# Patient Record
Sex: FEMALE | Race: WHITE | NOT HISPANIC OR LATINO | Employment: FULL TIME | URBAN - METROPOLITAN AREA
[De-identification: names, ages, dates, MRNs, and addresses within clinical notes are randomized per-mention and may not be internally consistent; named-entity substitution may affect disease eponyms.]

---

## 2017-06-29 ENCOUNTER — ALLSCRIPTS OFFICE VISIT (OUTPATIENT)
Dept: OTHER | Facility: OTHER | Age: 37
End: 2017-06-29

## 2018-01-10 NOTE — MISCELLANEOUS
Message   Recorded as Task   Date: 05/11/2016 09:01 AM, Created By: Catherine Villanueva   Task Name: Med Renewal Request   Assigned To: Marielle Doe   Regarding Patient: Ruel Mcghee, Status: Active   Comment:    Catherine Villanueva - 11 May 2016 9:01 AM     TASK CREATED  Caller: Self; Renew Medication; [phone] (Mobile Phone)  pt called - needs her nuvaring refilled till her 8/30/16 appt with D87  Sent to the [facility] in [address] 11 May 2016 9:10 AM     TASK EDITED  sent to pharm        Active Problems    1  Cervicitis (616 0) (N72)   2  Encounter for routine gynecological examination (V72 31) (Z01 419)   3  Menorrhagia (626 2) (N92 0)   4  Routine Gynecological Exam With Cervical Pap Smear (V72 31)   5  Screening for human papillomavirus (HPV) (V73 81) (Z11 51)    Current Meds   1  NuvaRing 0 12-0 015 MG/24HR Vaginal Ring; USE AS DIRECTED; Therapy: [id number] to (Evaluate:26Jan2016)  Requested for: [id number]; Last   Rx:[id number] Ordered   2  ZyrTEC Allergy CAPS; Therapy: (Recorded:28Oct2013) to Recorded    Allergies    1   No Known Drug Allergies    Plan  Menorrhagia    · NuvaRing 0 12-0 015 MG/24HR Vaginal Ring; USE AS DIRECTED    Signatures   Electronically signed by : Margie Lloyd, ; May 11 2016  9:10AM EST                       (Author)

## 2018-01-14 VITALS
BODY MASS INDEX: 32.02 KG/M2 | SYSTOLIC BLOOD PRESSURE: 120 MMHG | HEIGHT: 67 IN | WEIGHT: 204 LBS | DIASTOLIC BLOOD PRESSURE: 70 MMHG

## 2018-07-25 ENCOUNTER — ANNUAL EXAM (OUTPATIENT)
Dept: OBGYN CLINIC | Facility: CLINIC | Age: 38
End: 2018-07-25
Payer: COMMERCIAL

## 2018-07-25 VITALS
WEIGHT: 210 LBS | BODY MASS INDEX: 33.75 KG/M2 | DIASTOLIC BLOOD PRESSURE: 70 MMHG | HEIGHT: 66 IN | SYSTOLIC BLOOD PRESSURE: 124 MMHG

## 2018-07-25 DIAGNOSIS — Z01.419 ENCOUNTER FOR GYNECOLOGICAL EXAMINATION (GENERAL) (ROUTINE) WITHOUT ABNORMAL FINDINGS: Primary | ICD-10-CM

## 2018-07-25 DIAGNOSIS — Z30.44 ENCOUNTER FOR SURVEILLANCE OF VAGINAL RING HORMONAL CONTRACEPTIVE DEVICE: ICD-10-CM

## 2018-07-25 PROCEDURE — S0612 ANNUAL GYNECOLOGICAL EXAMINA: HCPCS | Performed by: PHYSICIAN ASSISTANT

## 2018-07-25 RX ORDER — ETONOGESTREL AND ETHINYL ESTRADIOL 11.7; 2.7 MG/1; MG/1
INSERT, EXTENDED RELEASE VAGINAL
COMMUNITY
Start: 2013-10-28 | End: 2018-07-25 | Stop reason: SDUPTHER

## 2018-07-25 RX ORDER — ETONOGESTREL AND ETHINYL ESTRADIOL 11.7; 2.7 MG/1; MG/1
1 INSERT, EXTENDED RELEASE VAGINAL
Qty: 3 EACH | Refills: 3 | Status: SHIPPED | OUTPATIENT
Start: 2018-07-25 | End: 2019-07-31 | Stop reason: SDUPTHER

## 2018-07-25 NOTE — PROGRESS NOTES
Assessment/Plan  Problem List Items Addressed This Visit     Encounter for gynecological examination (general) (routine) without abnormal findings - Primary     Annual exam performed  Contraception rx sent via EMR  RTO 1 year           Other Visit Diagnoses     Encounter for surveillance of vaginal ring hormonal contraceptive device        Relevant Medications    etonogestrel-ethinyl estradiol (NUVARING) 0 12-0 015 MG/24HR vaginal ring        Lela Dodd is a 43 y o  female who presents for annual GYN exam  She denies any changes in Medical, Surgical or Family  Periods are regular every 28-30 days  Dysmenorrhea:none  She denies intermenstrual bleeding, spotting, or discharge  She denies that she is sexually active at this time  Last Pap smear: 2016  Regular self breast exam: no    Family history of uterine or ovarian cancer: no  Family history of breast cancer: no  Family history of colon cancer: no    Menstrual History:  OB History      Para Term  AB Living    0 0 0 0 0 0    SAB TAB Ectopic Multiple Live Births    0 0 0 0 0        Obstetric Comments    FIRST PERIOD 14YEARS OLD           Patient's last menstrual period was 2018 (exact date)  Period Pattern: Regular  Dysmenorrhea: None    The following portions of the patient's history were reviewed and updated as appropriate: allergies, current medications, past family history, past medical history, past social history, past surgical history and problem list     Review of Systems  Review of Systems   Constitutional: Negative for chills and fever  Respiratory: Negative for shortness of breath  Cardiovascular: Negative for chest pain  Gastrointestinal: Negative for abdominal pain  Genitourinary: Negative for dysuria, pelvic pain, vaginal bleeding, vaginal discharge and vaginal pain  Negative for breast pain or lumps  Negative for stress urinary incontinence  Neurological: Negative for headaches  Objective   /70 (BP Location: Left arm, Patient Position: Sitting, Cuff Size: Large)   Ht 5' 6" (1 676 m)   Wt 95 3 kg (210 lb)   LMP 07/12/2018 (Exact Date)   BMI 33 89 kg/m²     Physical Exam

## 2019-07-31 ENCOUNTER — ANNUAL EXAM (OUTPATIENT)
Dept: OBGYN CLINIC | Facility: CLINIC | Age: 39
End: 2019-07-31
Payer: COMMERCIAL

## 2019-07-31 VITALS
HEIGHT: 67 IN | DIASTOLIC BLOOD PRESSURE: 84 MMHG | BODY MASS INDEX: 34.5 KG/M2 | WEIGHT: 219.8 LBS | SYSTOLIC BLOOD PRESSURE: 116 MMHG

## 2019-07-31 DIAGNOSIS — Z01.419 ENCOUNTER FOR GYNECOLOGICAL EXAMINATION: Primary | ICD-10-CM

## 2019-07-31 DIAGNOSIS — Z12.39 SCREENING FOR MALIGNANT NEOPLASM OF BREAST: ICD-10-CM

## 2019-07-31 DIAGNOSIS — Z30.44 ENCOUNTER FOR SURVEILLANCE OF VAGINAL RING HORMONAL CONTRACEPTIVE DEVICE: ICD-10-CM

## 2019-07-31 PROBLEM — M16.9 OSTEOARTHRITIS OF HIP: Status: ACTIVE | Noted: 2019-07-31

## 2019-07-31 PROCEDURE — S0612 ANNUAL GYNECOLOGICAL EXAMINA: HCPCS | Performed by: PHYSICIAN ASSISTANT

## 2019-07-31 RX ORDER — DIPHENOXYLATE HYDROCHLORIDE AND ATROPINE SULFATE 2.5; .025 MG/1; MG/1
1 TABLET ORAL DAILY
COMMUNITY
End: 2022-04-12

## 2019-07-31 RX ORDER — FLUOCINOLONE ACETONIDE 0.25 MG/G
OINTMENT TOPICAL
Refills: 2 | COMMUNITY
Start: 2019-06-02

## 2019-07-31 RX ORDER — ETONOGESTREL AND ETHINYL ESTRADIOL 11.7; 2.7 MG/1; MG/1
1 INSERT, EXTENDED RELEASE VAGINAL
Qty: 3 EACH | Refills: 3 | Status: SHIPPED | OUTPATIENT
Start: 2019-07-31 | End: 2020-08-18 | Stop reason: SDUPTHER

## 2019-07-31 NOTE — PROGRESS NOTES
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  We reviewed [facility] guidelines for Pap testing today       Last Pap 6/27/16 neg/neg  Last Mammo never    Family hx of breast cancer: yes - sister @ 40 Chris Hopkins)  Family hx of ovarian cancer: no  Family hx of colon cancer: no    Current Outpatient Medications:     etonogestrel-ethinyl estradiol (NUVARING) 0 12-0 015 MG/24HR vaginal ring, Insert 1 each into the vagina every 28 days, Disp: 3 each, Rfl: 3  Social History     Socioeconomic History    Marital status: Single     Spouse name: Not on file    Number of children: Not on file    Years of education: Not on file    Highest education level: Not on file   Occupational History    Not on file   Social Needs    Financial resource strain: Not on file    Food insecurity:     Worry: Not on file     Inability: Not on file    Transportation needs:     Medical: Not on file     Non-medical: Not on file   Tobacco Use    Smoking status: Never Smoker    Smokeless tobacco: Never Used   Substance and Sexual Activity    Alcohol use: Yes     Comment: SOCIAL    Drug use: No    Sexual activity: Not Currently     Birth control/protection: Ring   Lifestyle    Physical activity:     Days per week: Not on file     Minutes per session: Not on file    Stress: Not on file   Relationships    Social connections:     Talks on phone: Not on file     Gets together: Not on file     Attends Taoism service: Not on file     Active member of club or organization: Not on file     Attends meetings of clubs or organizations: Not on file     Relationship status: Not on file    Intimate partner violence:     Fear of current or ex partner: Not on file Emotionally abused: Not on file     Physically abused: Not on file     Forced sexual activity: Not on file   Other Topics Concern    Not on file   Social History Narrative    DAILY COFFEE CONSUMPTION (0) CUPS A DAY    SEDENTARY LIFESTYLE     Family History   Problem Relation Age of Onset    Arthritis Mother     Hypertension Mother     Skin cancer Mother     Diabetes Father         MELLITUS    Hypertension Father     Anemia Sister     Celiac disease Sister     Thyroid disease Sister     Varicose Veins Sister         LOWER EXTREMITIES    Liver cancer Maternal Grandmother     Diabetes Maternal Grandmother     Diabetes Paternal Grandmother         MELLIYUS    Diabetes Paternal Grandfather         MELLITUS      Past Medical History:   Diagnosis Date    Arthritis     Migraine with aura     Seasonal allergies         Review of Systems   Respiratory: Negative  Cardiovascular: Negative  Gastrointestinal: Negative for constipation and diarrhea  Genitourinary: Negative for difficulty urinating, pelvic pain, vaginal bleeding, vaginal discharge, itching or odor  O:  There were no vitals taken for this visit  Patient appears well and is not in distress  Neck is supple without masses  Breasts are symmetrical without mass, tenderness, nipple discharge, skin changes or adenopathy  Abdomen is soft and nontender without masses  External genitals are normal without lesions or rashes  Urethral meatus and urethra are normal  Bladder is normal to palpation  Vagina is normal without discharge or bleeding  Cervix is normal without discharge or lesion  Uterus is normal, mobile, nontender without palpable mass  Adnexa are normal, nontender, without palpable mass  A:  Yearly exam      P:   Pap due 2021   Mammo slip provided for baseline   Sister had negative BRCA testing   NuvaRing sent to pharmacy     RTO one year for yearly exam or sooner as needed

## 2019-09-13 ENCOUNTER — HOSPITAL ENCOUNTER (OUTPATIENT)
Dept: MAMMOGRAPHY | Facility: MEDICAL CENTER | Age: 39
Discharge: HOME | End: 2019-09-13
Payer: COMMERCIAL

## 2019-09-13 VITALS — BODY MASS INDEX: 34.37 KG/M2 | WEIGHT: 219 LBS | HEIGHT: 67 IN

## 2019-09-13 DIAGNOSIS — Z12.39 SCREENING FOR MALIGNANT NEOPLASM OF BREAST: ICD-10-CM

## 2019-09-13 PROCEDURE — 77067 SCR MAMMO BI INCL CAD: CPT

## 2019-09-13 PROCEDURE — 77063 BREAST TOMOSYNTHESIS BI: CPT

## 2020-01-28 NOTE — PROGRESS NOTES
Assessment/Plan:    Obesity, Class II, BMI 35-39 9  -Discussed options of HealthyCORE-Intensive Lifestyle Intervention Program, Very Low Calorie Diet-VLCD and Conservative Program and the role of weight loss medications   -Initial weight loss goal of 5-10% weight loss for improved health  -Screening labs: CMP, Mg, TSH, LP, and fasting insulin  -Patient is interested in pursuing Conservative Program   -Can consider Topamax with hx of migraines  -Reports cravings for salt and sweet  Will start with lifestyle changes, but can consider Topamax or Contrave  -She will try to add in exercise, but feels this is limited when she has flares of hip pain  May consider yoga  -Calorie goals, sample menu, portion size guidelines, and food logging reviewed with the patient  Osteoarthritis of hip  -sx may improve with weight loss    Migraine with aura  -takes Excedrin oprn  -Can consider Topamax    Follow up in approximately 6 weeks with Non-Surgical Physician/Advanced Practitioner  Goals:  Food log (ie ) www myfitnesspal com,sparkpeople  com,loseit com,calorieking  com,etc  baritastic  No sugary beverages  At least 64oz of water daily  Increase physical activity by 10 minutes daily  Gradually increase physical activity to a goal of 5 days per week for 30 minutes of MODERATE intensity PLUS 2 days per week of FULL BODY resistance training  5-10 servings of fruits and vegetables per day and 25-35 grams of dietary fiber per day, gradually increasing  2725-7988 calories   Recommend checking lab coverage before having labs drawn    Diagnoses and all orders for this visit:    Obesity, Class II, BMI 35-39 9  -     Comprehensive metabolic panel; Future  -     Insulin, fasting; Future  -     Lipid panel; Future  -     Magnesium; Future  -     TSH, 3rd generation with Free T4 reflex; Future    Primary osteoarthritis of both hips  -     Comprehensive metabolic panel; Future  -     Insulin, fasting; Future  -     Lipid panel;  Future  - Magnesium; Future  -     TSH, 3rd generation with Free T4 reflex; Future    Abnormal weight gain  -     Comprehensive metabolic panel; Future  -     Insulin, fasting; Future  -     Lipid panel; Future  -     Magnesium; Future  -     TSH, 3rd generation with Free T4 reflex; Future    Body mass index 37 0-37 9, adult    Migraine with aura and without status migrainosus, not intractable        Subjective:   Chief Complaint   Patient presents with    Consult     mwm consult     Patient ID: Malcolm Mcrae  is a 35 y o  female with excess weight/obesity here to pursue weight management  Past Medical History:   Diagnosis Date    Arthritis     Migraine with aura     Seasonal allergies      HPI:  Obesity/Excess Weight:  Severity: Severe  Onset:  Entire life    Modifiers: Physician Supervised Weight Loss Program; highest weight 262; has maintained 20 lb weight loss of the 90 lbs she lost 3 years ago  Contributing factors: Insufficient Physical Activity and Stress/Emotional Eating  Associated symptoms: fatigue and depression    Goals: 170s  Hydration: water/seltzer- at least 100 oz; also decaf tea  Alcohol: not regularly  [facility]: 1/8    She recently had a mammogram and has skin cancer screenings by dermatologist    The following portions of the patient's history were reviewed and updated as appropriate: allergies, current medications, past family history, past medical history, past social history, past surgical history and problem list     Review of Systems   Constitutional: Negative for chills and fever  HENT: Negative for sore throat  Respiratory: Positive for shortness of breath (with steps, which she attributes to deconditioning; recommend eval if sx persist or worsen)  Negative for cough  Cardiovascular: Negative for chest pain and palpitations  Gastrointestinal: Negative for constipation and diarrhea  Genitourinary: Negative for dysuria  Musculoskeletal: Positive for arthralgias (hips)     Skin: Negative for rash  Neurological: Positive for headaches (migraines- takes excedrin)  Psychiatric/Behavioral:        Denies concerns with mood     Objective:    /78 (BP Location: Left arm, Patient Position: Sitting, Cuff Size: Large)   Pulse 92   Temp 98 8 °F (37 1 °C) (Tympanic)   Resp 18   Ht 5' 7" (1 702 m)   Wt 110 kg (242 lb)   BMI 37 90 kg/m²     Physical Exam   Nursing note and vitals reviewed  Constitutional   General appearance: Abnormal   well developed and obese  Eyes No conjunctival pallor  Ears, Nose, Mouth, and Throat Oral mucosa moist    Pulmonary   Respiratory effort: No increased work of breathing or signs of respiratory distress  Auscultation of lungs: Clear to auscultation, equal breath sounds bilaterally, no wheezes, no rales, no rhonci  Cardiovascular   Auscultation of heart: Normal rate and rhythm, normal S1 and S2, without murmurs  Examination of extremities for edema and/or varicosities: Normal   no edema  Abdomen   Abdomen: Abnormal   The abdomen was obese  Bowel sounds were normal  The abdomen was soft and nontender     Musculoskeletal   Gait and station: Normal     Psychiatric   Orientation to person, place and time: Normal     Affect: appropriate    CEDRICK Sparrow also present for the visit

## 2020-01-31 ENCOUNTER — OFFICE VISIT (OUTPATIENT)
Dept: BARIATRICS | Facility: CLINIC | Age: 40
End: 2020-01-31
Payer: COMMERCIAL

## 2020-01-31 VITALS
RESPIRATION RATE: 18 BRPM | SYSTOLIC BLOOD PRESSURE: 120 MMHG | TEMPERATURE: 98.8 F | DIASTOLIC BLOOD PRESSURE: 78 MMHG | HEART RATE: 92 BPM | BODY MASS INDEX: 37.98 KG/M2 | HEIGHT: 67 IN | WEIGHT: 242 LBS

## 2020-01-31 DIAGNOSIS — G43.109 MIGRAINE WITH AURA AND WITHOUT STATUS MIGRAINOSUS, NOT INTRACTABLE: ICD-10-CM

## 2020-01-31 DIAGNOSIS — E66.9 OBESITY, CLASS II, BMI 35-39.9: Primary | ICD-10-CM

## 2020-01-31 DIAGNOSIS — R63.5 ABNORMAL WEIGHT GAIN: ICD-10-CM

## 2020-01-31 DIAGNOSIS — M16.0 PRIMARY OSTEOARTHRITIS OF BOTH HIPS: ICD-10-CM

## 2020-01-31 PROBLEM — E66.812 OBESITY, CLASS II, BMI 35-39.9: Status: ACTIVE | Noted: 2020-01-31

## 2020-01-31 PROCEDURE — 99244 OFF/OP CNSLTJ NEW/EST MOD 40: CPT | Performed by: PHYSICIAN ASSISTANT

## 2020-01-31 NOTE — ASSESSMENT & PLAN NOTE
-Discussed options of HealthyCORE-Intensive Lifestyle Intervention Program, Very Low Calorie Diet-VLCD and Conservative Program and the role of weight loss medications   -Initial weight loss goal of 5-10% weight loss for improved health  -Screening labs: CMP, Mg, TSH, LP, and fasting insulin  -Patient is interested in pursuing Conservative Program   -Can consider Topamax with hx of migraines  -Reports cravings for salt and sweet  Will start with lifestyle changes, but can consider Topamax or Contrave  -She will try to add in exercise, but feels this is limited when she has flares of hip pain  May consider yoga  -Calorie goals, sample menu, portion size guidelines, and food logging reviewed with the patient

## 2020-01-31 NOTE — PATIENT INSTRUCTIONS
Goals: Food log (ie ) www myfitnesspal com,sparkpeople  com,loseit com,calorieking  com,etc  baritastic  No sugary beverages  At least 64oz of water daily  Increase physical activity by 10 minutes daily   Gradually increase physical activity to a goal of 5 days per week for 30 minutes of MODERATE intensity PLUS 2 days per week of FULL BODY resistance training  5-10 servings of fruits and vegetables per day and 25-35 grams of dietary fiber per day, gradually increasing  4281-1158 calories  Recommend checking lab coverage before having labs drawn

## 2020-02-03 LAB
ALBUMIN SERPL-MCNC: 3.9 G/DL (ref 3.6–5.1)
ALBUMIN/GLOB SERPL: 1.4 (CALC) (ref 1–2.5)
ALP SERPL-CCNC: 46 U/L (ref 33–115)
ALT SERPL-CCNC: 12 U/L (ref 6–29)
AST SERPL-CCNC: 14 U/L (ref 10–30)
BILIRUB SERPL-MCNC: 0.4 MG/DL (ref 0.2–1.2)
BUN SERPL-MCNC: 12 MG/DL (ref 7–25)
BUN/CREAT SERPL: NORMAL (CALC) (ref 6–22)
CALCIUM SERPL-MCNC: 9.2 MG/DL (ref 8.6–10.2)
CHLORIDE SERPL-SCNC: 107 MMOL/L (ref 98–110)
CHOLEST SERPL-MCNC: 188 MG/DL
CHOLEST/HDLC SERPL: 2.3 (CALC)
CO2 SERPL-SCNC: 25 MMOL/L (ref 20–32)
CREAT SERPL-MCNC: 1.05 MG/DL (ref 0.5–1.1)
GLOBULIN SER CALC-MCNC: 2.8 G/DL (CALC) (ref 1.9–3.7)
GLUCOSE SERPL-MCNC: 82 MG/DL (ref 65–99)
HDLC SERPL-MCNC: 82 MG/DL
INSULIN SERPL-ACNC: 9.9 UIU/ML (ref 2–19.6)
LDLC SERPL CALC-MCNC: 84 MG/DL (CALC)
MAGNESIUM SERPL-MCNC: 2 MG/DL (ref 1.5–2.5)
NONHDLC SERPL-MCNC: 106 MG/DL (CALC)
POTASSIUM SERPL-SCNC: 4.8 MMOL/L (ref 3.5–5.3)
PROT SERPL-MCNC: 6.7 G/DL (ref 6.1–8.1)
SL AMB EGFR AFRICAN AMERICAN: 77 ML/MIN/1.73M2
SL AMB EGFR NON AFRICAN AMERICAN: 66 ML/MIN/1.73M2
SODIUM SERPL-SCNC: 141 MMOL/L (ref 135–146)
TRIGL SERPL-MCNC: 119 MG/DL
TSH SERPL-ACNC: 1.83 MIU/L

## 2020-04-09 ENCOUNTER — TELEPHONE (OUTPATIENT)
Dept: BARIATRICS | Facility: CLINIC | Age: 40
End: 2020-04-09

## 2020-06-15 ENCOUNTER — TRANSCRIBE ORDERS (OUTPATIENT)
Dept: ADMINISTRATIVE | Facility: HOSPITAL | Age: 40
End: 2020-06-15

## 2020-06-15 DIAGNOSIS — Z12.31 VISIT FOR SCREENING MAMMOGRAM: Primary | ICD-10-CM

## 2020-07-27 ENCOUNTER — CLINICAL SUPPORT (OUTPATIENT)
Dept: BARIATRICS | Facility: CLINIC | Age: 40
End: 2020-07-27

## 2020-07-27 VITALS
HEART RATE: 85 BPM | WEIGHT: 256 LBS | SYSTOLIC BLOOD PRESSURE: 124 MMHG | BODY MASS INDEX: 40.18 KG/M2 | HEIGHT: 67 IN | TEMPERATURE: 97.3 F | DIASTOLIC BLOOD PRESSURE: 88 MMHG

## 2020-07-27 DIAGNOSIS — E66.01 MORBID OBESITY (HCC): Primary | ICD-10-CM

## 2020-07-27 DIAGNOSIS — E66.01 OBESITY, CLASS III, BMI 40-49.9 (MORBID OBESITY) (HCC): Primary | ICD-10-CM

## 2020-07-27 PROCEDURE — RECHECK

## 2020-07-27 NOTE — PROGRESS NOTES
Bariatric Behavioral Health Evaluation    Presenting Problem: 38year old female ( 1980) here fore behavioral health evaluation  Patient is hoping to reduce weight on her joints and have a healthier lifestyle  Is the patient seeking Bariatric Surgery Eval? Yes  If yes how long have you researched this surgery option  Patient has been looking into bariatric surgery and did some research online about 3 years ago  Patient reports she was trying to lose weight on her own and has gained a lot of weight since COVID-19  Realizes Post- Op Requirements? Yes     Pre-morbid level of function and history of present illness: Patient reports struggling with her weight since childhood  Psychiatric/Psychological Treatment Diagnosis: Patient denies any current or past mental health diagnosis or treatment  Patient educated on the benefits of outpatient therapy to the bariatric patient while going through the process of surgery, resource list provided  Outpatient Counselor No     Psychiatrist No     Have you had Inpatient Treatment? No    Family Constellation (include relationship with each and Psych/Med HX)    Mother  obesity and Father  obesity    Domestic Violence No    Abuse History:  Patient denies this  Additional comments/stressors related to family/relationships/peer support: Patient identifies her parents and her sister as her support  Physical/Psychological Assessment:     Appearance: appropriate  Sociability: average  Affect: appropriate  Mood: calm  Thought Process: coherent  Speech: normal  Content: no impairment  Orientation: person  Yes , place  Yes , time  Yes , normal attention span  Yes , normal memory  Yes   and normal judgement  Yes   Insight: emotional  good    Risk Assessment:     none    Recommendations: Recommended for surgery  yes    Risk of Harm to Self or Others: Patient denies SI or HI    Observation:     Interviews This interview only      Access to weapons yes     Weapons secured by: locked    Based on the previous information, the client presents the following risk of harm to self or others: low     Note: Patient denies any current or past mental health diagnosis or treatment  Patient educated on the benefits of outpatient therapy to the bariatric patient while going through the process of surgery, resource list provided  Patient denies any current or past drug or alcohol abuse  Patient denies any tobacco use  Patient reports drinking 1 glass of wine about 2-3x per month  Patient educated on the impact of nicotine and alcohol on the post bariatric patient  Patient meets criteria for surgery at this program and is referred to surgeon  BARIATRIC SURGERY EDUCATION CHECKLIST    I have received education related to my bariatric surgery process and understand:    Patients may be required to complete a psychiatric evaluation and receive clearance for surgery from their psychiatrist     Patients who undergo weight loss surgery are at higher risk of increased mental health concerns and suicide attempts  Patients may be required to complete a full substance abuse evaluation and then complete all treatment recommendations prior to surgery  If diagnosis of abuse/dependence results, patient may be required to remain sober for one (1) year before having bariatric surgery  Patients on psychiatric medications should check with their provider to discuss psychiatric medications and the changes in absorption  Patient should discuss all time release medications with provider and take all medications as prescribed  The recommendation is that there is no use of  any tobacco products, Hookah or  vapes for the bariatric post-operation patient  Bariatric surgery patients should not consume alcohol as a post-operative patient as it may increase risk of numerous health conditions including but not limited to alcohol abuse and ulcers      There is a possibility of weight regain if patient does not follow all program guidelines and recommendations  Bariatric surgery patients should exercise thirty (30) to sixty (60) minutes per day to maintain post-surgical weight loss  Research indicates that bariatric patients are more successful when they see a therapist for up to two (2) years post-op  Patients will follow all medical and dietary recommendations provided  Patient will keep all scheduled appointments and follow up with their physician for a minimum of five (5) years  Patient will take all vitamins as recommended  Post-operative vitamins are life-long  Patient reviewed Bariatric Surgery Education Checklist and agrees they have received education on these issues

## 2020-07-27 NOTE — PROGRESS NOTES
Bariatric Nutrition Assessment Note    Type of surgery    Preop 6 month program  Surgery Date: TBD- tentative February 2021  Surgeon: Dr Wanda Mena  38 y o   female     Wt with BMI of 25: 159 3#  Pre-Op Excess Wt: 96 7#  /88 (BP Location: Left arm, Patient Position: Sitting)   Pulse 85   Temp (!) 97 3 °F (36 3 °C)   Ht 5' 7" (1 702 m)   Wt 116 kg (256 lb)   BMI 40 10 kg/m²     [place] [place] Equation: At sedentary activity level:   For weight maintenance: 2231 kcal/day  For 2 lb/wk wt loss: 1231 kcal/day  Protein: 0 8-1 0 g/kg IBW= 58-72 4g/day    Weight History   Onset of Obesity: Childhood  Family history of obesity: Yes  Wt Loss Attempts: Commercial Programs ([employer], Francia Crockett, etc )  Counseling with  MD  Exercise  FAD Diets (Cabbage soup, Grapefruit, Cleanse, etc )  Meal Replacements (Medifast, Slim Fast, etc )  OTC meds/supplements  Self Created Diets (Portion Control, Healthy Food Choices, etc )  Patient has tried the above for 6 months or more with insufficient weight loss or weight regain, which is why patient has requested to be evaluated for weight loss surgery today  Maximum Wt Lost: Pt went from 260lbs to 174lbs (86lb wt loss) with Dr Carolyn Castano weight loss program     Review of History and Medications   Past Medical History:   Diagnosis Date    Arthritis     Asthma     GERD (gastroesophageal reflux disease)     Migraine with aura     Osteoarthritis     Seasonal allergies      Past Surgical History:   Procedure Laterality Date    ANKLE SURGERY      ANKLE STABALIZATION    CHOLECYSTECTOMY  2015    TOOTH EXTRACTION      TOTAL HIP ARTHROPLASTY Left      Social History     Socioeconomic History    Marital status: Single     Spouse name: None    Number of children: None    Years of education: None    Highest education level: None   Occupational History    None   Social Needs    Financial resource strain: None   [employer] insecurity:     Worry: None     Inability: None    Transportation needs:     Medical: None     Non-medical: None   Tobacco Use    Smoking status: Never Smoker    Smokeless tobacco: Never Used   Substance and Sexual Activity    Alcohol use: Yes     Comment: SOCIAL    Drug use: No    Sexual activity: Not Currently     Birth control/protection: Ring   Lifestyle    Physical activity:     Days per week: None     Minutes per session: None    Stress: None   Relationships    Social connections:     Talks on phone: None     Gets together: None     Attends Catholic service: None     Active member of club or organization: None     Attends meetings of clubs or organizations: None     Relationship status: None    Intimate partner violence:     Fear of current or ex partner: None     Emotionally abused: None     Physically abused: None     Forced sexual activity: None   Other Topics Concern    None   Social History Narrative    DAILY COFFEE CONSUMPTION (0) CUPS A DAY    SEDENTARY LIFESTYLE       Current Outpatient Medications:     Cetirizine HCl (ZYRTEC PO), Zyrtec, Disp: , Rfl:     etonogestrel-ethinyl estradiol (NUVARING) 0 12-0 015 MG/24HR vaginal ring, Insert 1 each into the vagina every 28 days, Disp: 3 each, Rfl: 3    fluocinolone (SYNALAR) 0 025 % ointment, APPLY TO SKIN TWICE DAILY, Disp: , Rfl: 2    multivitamin (THERAGRAN) TABS, Take 1 tablet by mouth daily, Disp: , Rfl:      Food Intake and Lifestyle Assessment   Food Intake Assessment completed via usual diet recall  Breakfast: 2 eggs with tomatoes, olives, abanana peppers on 2 slice toast or flatbread  Snack: handful unsalted pretzels or single serve bag of trail mix   Lunch: chickpea pasta salad with tomatoes and feta  Snack: 2-3pm: apple or yogurt or handful pretzels  Dinner: 5pm: fish/seafood, vegetable  Snack: chips, or frozen grapes, or nondairy ice cream (1 serving)  Beverage intake: water, seltzer, and coffee, decaf tea  Protein supplement: none  Estimated protein intake per day: 30-60g  Estimated fluid intake per day: 16-20 oz coffee, 30 oz bottles x 2-3 (60-90oz), 2 cup hot decaf tea  Meals eaten away from home: one meals per week: diner or pizza  Typical meal pattern: 3 meals per day and 2-3 snacks per day  Eating Behaviors: Consumption of high calorie/ high fat foods, Large portion sizes, Frequent snacking/ grazing, Mindless eating, Emotional eating and Craves salty foods  Food allergies or intolerances: No Known Allergies  Cultural or Anglican considerations: NKFA    Physical Assessment  Physical Activity  Types of exercise: None  PT for R hip twice weekly for 60 minutes  Does exercises herslef at home 15-30 minutes most other dasy  Current physical limitations: hip arthritis, previous L hip replacement, R hip pain    Psychosocial Assessment   Support systems: parent(s)  Socioeconomic factors: mother and pt do grocery shopping and cooking  Was working from home for 3 weeks due to [address], not back in her office  Works for [employer]  Nutrition Diagnosis  Diagnosis: Overweight / Obesity (NC-3 3) and Excessive energy intake (NI-1 5)  Related to: Physical inactivity and Excessive energy intake  As Evidenced by: BMI >25, Excessive energy intake and Unintentional weight gain     Nutrition Prescription: Recommend the following diet  Regular    Interventions and Teaching   Discussed pre-op and post-op nutrition guidelines  Patient educated and handouts provided    Surgical changes to stomach / GI  Capacity of post-surgery stomach  Diet progression  Adequate hydration  Sugar and fat restriction to decrease "dumping syndrome"  Fat restriction to decrease steatorrhea  Expected weight loss  Weight loss plateaus/ possibility of weight regain  Exercise  Suggestions for pre-op diet  Nutrition considerations after surgery  Protein supplements  Meal planning and preparation  Appropriate carbohydrate, protein, and fat intake, and food/fluid choices to maximize safe weight loss, nutrient intake, and tolerance   Dietary and lifestyle changes  Possible problems with poor eating habits  Intuitive eating  Techniques for self monitoring and keeping daily food journal  Potential for food intolerance after surgery, and ways to deal with them including: lactose intolerance, nausea, reflux, vomiting, diarrhea, food intolerance, appetite changes, gas  Vitamin / Mineral supplementation: Pt reports she currently takes:  Multivitamin  Fiber  Curcumin  Glucosamine  B-complex  Vitamin D    Patient provided with gym coupon for [employer] Assessment: No    Education provided to: patient  Barriers to learning: No barriers identified  Readiness to change: preparation  Prior research on procedure: pre-op class  Comprehension: needs reinforcement and verbalizes understanding   Expected Compliance: good    Recommendations  Pt is an appropriate candidate for surgery   Yes    Evaluation / Monitoring  Dietitian to Monitor: Eating pattern as discussed Tolerance of nutrition prescription Body weight Lab values Physical activity    Goals  Food journal, Exercise 30 minutes 5 times per week, Complete lession plans 1-6, Eat 3 meals per day and Eliminate mindless snacking    Time Spent:   1 Hour

## 2020-07-27 NOTE — PROGRESS NOTES
Bariatric Nutrition Assessment Note    Type of surgery    Preop  Surgery Date: 6 month program requirement   Surgeon: Dr Atiya Yeung  38 y o   female     Wt with BMI of 25: 159 3 lbs   Pre-Op Excess Wt: 96 7 #  /88 (BP Location: Left arm, Patient Position: Sitting)   Pulse 85   Temp (!) 97 3 °F (36 3 °C)   Ht 5' 7" (1 702 m)   Wt 116 kg (256 lb)   BMI 40 10 kg/m²     [place] Equation:   1859 kcal   Estimated calories for weight loss 1688-9172 kcal  ( 1-2# per wk wt loss - sedentary )  Estimated protein needs 72-87 grams (1 0-1 2 gms/kg IBW )   Estimated fluid needs 2534 ml (35 ml/kg IBW ) 85 ounces     Weight History   Onset of Obesity: {NUT Child or Adult:71356}  Family history of obesity: {YES/NO:35776}  Wt Loss Attempts: {NUT Weight Loss Methods:88627}  Patient has tried the above for 6 months or more with insufficient weight loss or weight regain, which is why patient has requested to be evaluated for weight loss surgery today  Maximum Wt Lost: ***      Review of History and Medications   Past Medical History:   Diagnosis Date    Arthritis     Asthma     GERD (gastroesophageal reflux disease)     Migraine with aura     Osteoarthritis     Seasonal allergies      Past Surgical History:   Procedure Laterality Date    ANKLE SURGERY      ANKLE STABALIZATION    CHOLECYSTECTOMY  2015    TOOTH EXTRACTION      TOTAL HIP ARTHROPLASTY Left      Social History     Socioeconomic History    Marital status: Single     Spouse name: None    Number of children: None    Years of education: None    Highest education level: None   Occupational History    None   Social Needs    Financial resource strain: None    Food insecurity:     Worry: None     Inability: None    Transportation needs:     Medical: None     Non-medical: None   Tobacco Use    Smoking status: Never Smoker    Smokeless tobacco: Never Used   Substance and Sexual Activity    Alcohol use: Yes     Comment: SOCIAL    Drug use: No    Sexual activity: Not Currently     Birth control/protection: Ring   Lifestyle    Physical activity:     Days per week: None     Minutes per session: None    Stress: None   Relationships    Social connections:     Talks on phone: None     Gets together: None     Attends Oriental orthodox service: None     Active member of club or organization: None     Attends meetings of clubs or organizations: None     Relationship status: None    Intimate partner violence:     Fear of current or ex partner: None     Emotionally abused: None     Physically abused: None     Forced sexual activity: None   Other Topics Concern    None   Social History Narrative    DAILY COFFEE CONSUMPTION (0) CUPS A DAY    SEDENTARY LIFESTYLE       Current Outpatient Medications:     Cetirizine HCl (ZYRTEC PO), Zyrtec, Disp: , Rfl:     etonogestrel-ethinyl estradiol (NUVARING) 0 12-0 015 MG/24HR vaginal ring, Insert 1 each into the vagina every 28 days, Disp: 3 each, Rfl: 3    fluocinolone (SYNALAR) 0 025 % ointment, APPLY TO SKIN TWICE DAILY, Disp: , Rfl: 2    multivitamin (THERAGRAN) TABS, Take 1 tablet by mouth daily, Disp: , Rfl:   Food Intake and Lifestyle Assessment   Food Intake Assessment completed via {oral intake:93280}  Breakfast: ***  Snack: ***   Lunch: ***  Snack: ***  Dinner: ***  Snack: ***  Beverage intake: {beverage intake:23957}  Protein supplement: ***  Estimated protein intake per day: ***  Estimated fluid intake per day: ***  Meals eaten away from home: ***  Typical meal pattern: *** meals per day and *** snacks per day  Eating Behaviors: {NUT Eating Behaviors Initial:13530}  Food allergies or intolerances: No Known Allergies  Cultural or Oriental orthodox considerations: ***    Physical Assessment  Physical Activity  Types of exercise: {NUT EXERCISE QTXD:29967}  Current physical limitations: ***    Psychosocial Assessment   Support systems: {nutrition support systems:32997}  Socioeconomic factors: ***    Nutrition Diagnosis  Diagnosis: {LV LM WT LOSS NUTRITIONAL DX:66804}  Related to: {LV LM WT LOSS RELATED TO:85885}  As Evidenced by: {LV LM WT LOSS AS EVIDENCED FX:09437}     Nutrition Prescription: Recommend the following diet  {nutrition prescription:80918}    Interventions and Teaching   Discussed pre-op and post-op nutrition guidelines  Patient educated and handouts provided  {bariatric patient education:49453}    Patient provided with gym coupon for [employer] Assessment: {YES NO NA ORDERS OFO:32218}    Education provided to: {Patient/Family/Caregiver:37804}    Barriers to learning: {NUT Barriers to Tx:64526}  Readiness to change: {dx readiness to change:93592}    Prior research on procedure: {prior research:64158}    Comprehension: {RESPONSES; PT KOL:27337}     Expected Compliance: {GOOD:40161}  Recommendations  Pt is an appropriate candidate for surgery   {YES NO NA ORDERS UVA:40549}  Pt should make the following changes and then be reassessed for weight loss surgery:   ***  Evaluation / Monitoring  Dietitian to Monitor: {dietitian to monitor:68840}  ***  Goals  {GOALS:23210}    Time Spent:   {Time; intervals:98963} ***

## 2020-08-17 ENCOUNTER — OFFICE VISIT (OUTPATIENT)
Dept: FAMILY MEDICINE CLINIC | Facility: CLINIC | Age: 40
End: 2020-08-17
Payer: COMMERCIAL

## 2020-08-17 VITALS
DIASTOLIC BLOOD PRESSURE: 90 MMHG | TEMPERATURE: 98.5 F | HEART RATE: 103 BPM | SYSTOLIC BLOOD PRESSURE: 148 MMHG | OXYGEN SATURATION: 97 % | WEIGHT: 257.8 LBS | BODY MASS INDEX: 40.46 KG/M2 | HEIGHT: 67 IN | RESPIRATION RATE: 18 BRPM

## 2020-08-17 DIAGNOSIS — M25.551 CHRONIC RIGHT HIP PAIN: ICD-10-CM

## 2020-08-17 DIAGNOSIS — M21.70 UNEQUAL LEG LENGTH: ICD-10-CM

## 2020-08-17 DIAGNOSIS — G89.29 CHRONIC RIGHT HIP PAIN: ICD-10-CM

## 2020-08-17 PROCEDURE — 3725F SCREEN DEPRESSION PERFORMED: CPT | Performed by: FAMILY MEDICINE

## 2020-08-17 PROCEDURE — 99204 OFFICE O/P NEW MOD 45 MIN: CPT | Performed by: FAMILY MEDICINE

## 2020-08-17 PROCEDURE — 1036F TOBACCO NON-USER: CPT | Performed by: FAMILY MEDICINE

## 2020-08-17 NOTE — PROGRESS NOTES
Chief Complaint   Patient presents with   [facility]      talk about bariatric surgery       Assessment/Plan:  LL study, foot lifts and follow up  BMI Counseling: Body mass index is 40 38 kg/m²  The BMI is above normal  Nutrition recommendations include reducing portion sizes, consuming healthier snacks, decreasing soda and/or juice intake, moderation in carbohydrate intake and increasing intake of lean protein  PHQ-9 Depression Screening    PHQ-9:    Frequency of the following problems over the past two weeks:       Little interest or pleasure in doing things:  0 - not at all  Feeling down, depressed, or hopeless:  0 - not at all  PHQ-2 Score:  0          Diagnoses and all orders for this visit:    BMI 40 0-44 9, adult (HCC)    Unequal leg length  -     CT leg length evaluation (scanogram); Future    Chronic right hip pain          Subjective:      Patient ID: Mando Luna is a 38 y o  female  First visit  FMD, Dr Delphine Serrato retired  Thinking about surgery for weight loss  Here to discuss the surgery  Problem with walking, right hip  Patient would like to be able to walk more to help with weight loss  The following portions of the patient's history were reviewed and updated as appropriate: allergies, current medications, past family history, past medical history, past social history, past surgical history and problem list   I have spent 45 minutes with Patient  today in which greater than 50% of this time was spent in counseling/coordination of care regarding Diagnostic results, Risks and benefits of tx options, Intructions for management, Patient and family education, Importance of tx compliance, Risk factor reductions and Impressions  Review of Systems   Constitutional: Negative  HENT: Negative  Eyes: Negative  Respiratory: Negative  Cardiovascular: Negative  Gastrointestinal: Negative  Genitourinary: Negative  Musculoskeletal: Positive for back pain     Skin: Negative  Neurological: Negative  Psychiatric/Behavioral: Negative  Objective:      /90 (BP Location: Left arm, Patient Position: Sitting, Cuff Size: Large)   Pulse 103   Temp 98 5 °F (36 9 °C) (Tympanic)   Resp 18   Ht 5' 7" (1 702 m)   Wt 117 kg (257 lb 12 8 oz)   LMP 06/01/2020 (Approximate)   SpO2 97%   BMI 40 38 kg/m²       Current Outpatient Medications:     Cetirizine HCl (ZYRTEC PO), Zyrtec, Disp: , Rfl:     etonogestrel-ethinyl estradiol (NUVARING) 0 12-0 015 MG/24HR vaginal ring, Insert 1 each into the vagina every 28 days, Disp: 3 each, Rfl: 3    fluocinolone (SYNALAR) 0 025 % ointment, APPLY TO SKIN TWICE DAILY, Disp: , Rfl: 2    multivitamin (THERAGRAN) TABS, Take 1 tablet by mouth daily, Disp: , Rfl:      Physical Exam  Constitutional:       Appearance: She is well-developed  HENT:      Head: Normocephalic and atraumatic  Right Ear: External ear normal       Left Ear: External ear normal       Nose: Nose normal    Eyes:      Conjunctiva/sclera: Conjunctivae normal       Pupils: Pupils are equal, round, and reactive to light  Neck:      Musculoskeletal: Normal range of motion and neck supple  Cardiovascular:      Rate and Rhythm: Normal rate and regular rhythm  Heart sounds: Normal heart sounds  Pulmonary:      Effort: Pulmonary effort is normal       Breath sounds: Normal breath sounds  Abdominal:      General: Bowel sounds are normal       Palpations: Abdomen is soft  Musculoskeletal:      Comments: Stand: Inferior left  innominate  Skin:     General: Skin is warm and dry  Neurological:      Mental Status: She is alert and oriented to person, place, and time  Deep Tendon Reflexes: Reflexes are normal and symmetric  Psychiatric:         Behavior: Behavior normal          Thought Content:  Thought content normal          Judgment: Judgment normal

## 2020-08-18 ENCOUNTER — ANNUAL EXAM (OUTPATIENT)
Dept: OBGYN CLINIC | Facility: CLINIC | Age: 40
End: 2020-08-18
Payer: COMMERCIAL

## 2020-08-18 VITALS
WEIGHT: 260 LBS | SYSTOLIC BLOOD PRESSURE: 134 MMHG | HEIGHT: 67 IN | BODY MASS INDEX: 40.81 KG/M2 | TEMPERATURE: 98.6 F | DIASTOLIC BLOOD PRESSURE: 82 MMHG

## 2020-08-18 DIAGNOSIS — Z12.31 ENCOUNTER FOR SCREENING MAMMOGRAM FOR MALIGNANT NEOPLASM OF BREAST: ICD-10-CM

## 2020-08-18 DIAGNOSIS — Z01.419 ENCNTR FOR GYN EXAM (GENERAL) (ROUTINE) W/O ABN FINDINGS: ICD-10-CM

## 2020-08-18 DIAGNOSIS — Z30.44 ENCOUNTER FOR SURVEILLANCE OF VAGINAL RING HORMONAL CONTRACEPTIVE DEVICE: ICD-10-CM

## 2020-08-18 PROCEDURE — 3008F BODY MASS INDEX DOCD: CPT | Performed by: PHYSICIAN ASSISTANT

## 2020-08-18 PROCEDURE — G0145 SCR C/V CYTO,THINLAYER,RESCR: HCPCS | Performed by: PHYSICIAN ASSISTANT

## 2020-08-18 PROCEDURE — 87624 HPV HI-RISK TYP POOLED RSLT: CPT | Performed by: PHYSICIAN ASSISTANT

## 2020-08-18 PROCEDURE — S0612 ANNUAL GYNECOLOGICAL EXAMINA: HCPCS | Performed by: PHYSICIAN ASSISTANT

## 2020-08-18 RX ORDER — ETONOGESTREL AND ETHINYL ESTRADIOL 11.7; 2.7 MG/1; MG/1
1 INSERT, EXTENDED RELEASE VAGINAL
Qty: 4 EACH | Refills: 3 | Status: SHIPPED | OUTPATIENT
Start: 2020-08-18 | End: 2021-11-05 | Stop reason: SDUPTHER

## 2020-08-18 NOTE — PROGRESS NOTES
Argelia Guerra  1980      CC:  Yearly exam    S:  38 y o  female here for yearly exam  She is amenorrheic on continuous NuvaRing  She just got back from an 11 day trip ranging from [place] to [place] to visit family  Sexual activity: She is sexually active without pain, bleeding or dryness  Last Pap 6/27/16 neg/neg  Last Mammo 9/13/19 neg    We reviewed ASCCP guidelines for Pap testing today       Family hx of breast cancer: sister  Family hx of ovarian cancer: no  Family hx of colon cancer: no      Current Outpatient Medications:     Cetirizine HCl (ZYRTEC PO), Zyrtec, Disp: , Rfl:     etonogestrel-ethinyl estradiol (NUVARING) 0 12-0 015 MG/24HR vaginal ring, Insert 1 each into the vagina every 28 days, Disp: 3 each, Rfl: 3    fluocinolone (SYNALAR) 0 025 % ointment, APPLY TO SKIN TWICE DAILY, Disp: , Rfl: 2    multivitamin (THERAGRAN) TABS, Take 1 tablet by mouth daily, Disp: , Rfl:   Social History     Socioeconomic History    Marital status: Single     Spouse name: Not on file    Number of children: Not on file    Years of education: Not on file    Highest education level: Not on file   Occupational History    Not on file   Social Needs    Financial resource strain: Not on file    Food insecurity     Worry: Not on file     Inability: Not on file    Transportation needs     Medical: Not on file     Non-medical: Not on file   Tobacco Use    Smoking status: Never Smoker    Smokeless tobacco: Never Used   Substance and Sexual Activity    Alcohol use: Yes     Frequency: Never     Drinks per session: 1 or 2     Binge frequency: Never     Comment: SOCIAL    Drug use: No    Sexual activity: Not Currently     Birth control/protection: Ring   Lifestyle    Physical activity     Days per week: Not on file     Minutes per session: Not on file    Stress: Not on file   Relationships    Social connections     Talks on phone: Not on file     Gets together: Not on file     Attends Rastafari service: Not on file     Active member of club or organization: Not on file     Attends meetings of clubs or organizations: Not on file     Relationship status: Not on file    Intimate partner violence     Fear of current or ex partner: Not on file     Emotionally abused: Not on file     Physically abused: Not on file     Forced sexual activity: Not on file   Other Topics Concern    Not on file   Social History Narrative    DAILY COFFEE CONSUMPTION (0) CUPS A DAY    SEDENTARY LIFESTYLE     Family History   Problem Relation Age of Onset    Arthritis Mother     Hypertension Mother     Skin cancer Mother     Diabetes Father         MELLITUS    Hypertension Father     Anemia Sister     Celiac disease Sister     Thyroid disease Sister     Varicose Veins Sister         LOWER EXTREMITIES    Breast cancer Sister 40    Liver cancer Maternal Grandmother     Diabetes Maternal Grandmother     Cancer Maternal Grandmother         liver    Diabetes Paternal Grandmother         MELLIYUS    Diabetes Paternal Grandfather         MELLITUS    Stroke Paternal Grandfather     Stroke Paternal Uncle     Heart disease Neg Hx       Past Medical History:   Diagnosis Date    Arthritis     Asthma     GERD (gastroesophageal reflux disease)     Migraine with aura     Osteoarthritis     Seasonal allergies         Review of Systems   Respiratory: Negative  Cardiovascular: Negative  Gastrointestinal: Negative for constipation and diarrhea  Genitourinary: Negative for difficulty urinating, pelvic pain, vaginal bleeding, vaginal discharge, itching or odor  O:  Blood pressure 134/82, temperature 98 6 °F (37 °C), temperature source Tympanic, height 5' 6 93" (1 7 m), weight 118 kg (260 lb), last menstrual period 06/18/2020  Patient appears well and is not in distress  Neck is supple without masses  Breasts are symmetrical without mass, tenderness, nipple discharge, skin changes or adenopathy     Abdomen is soft and nontender without masses  External genitals are normal without lesions or rashes  Urethral meatus and urethra are normal  Bladder is normal to palpation  Vagina is normal without discharge or bleeding  Cervix is normal without discharge or lesion  Uterus is normal, mobile, nontender without palpable mass  Adnexa are normal, nontender, without palpable mass  A:  Yearly exam      P:   Pap and HPV today     Mammo slip provided    NuvaRing sent to pharmacy     RTO one year for yearly exam or sooner as needed

## 2020-08-21 ENCOUNTER — OFFICE VISIT (OUTPATIENT)
Dept: BARIATRICS | Facility: CLINIC | Age: 40
End: 2020-08-21
Payer: COMMERCIAL

## 2020-08-21 VITALS
HEIGHT: 67 IN | SYSTOLIC BLOOD PRESSURE: 150 MMHG | BODY MASS INDEX: 40.42 KG/M2 | HEART RATE: 92 BPM | DIASTOLIC BLOOD PRESSURE: 72 MMHG | TEMPERATURE: 98.2 F | WEIGHT: 257.5 LBS

## 2020-08-21 DIAGNOSIS — E66.9 OBESITY, CLASS II, BMI 35-39.9: Primary | ICD-10-CM

## 2020-08-21 LAB
HPV HR 12 DNA CVX QL NAA+PROBE: NEGATIVE
HPV16 DNA CVX QL NAA+PROBE: NEGATIVE
HPV18 DNA CVX QL NAA+PROBE: NEGATIVE

## 2020-08-21 PROCEDURE — 99214 OFFICE O/P EST MOD 30 MIN: CPT | Performed by: SURGERY

## 2020-08-21 PROCEDURE — 3008F BODY MASS INDEX DOCD: CPT | Performed by: SURGERY

## 2020-08-21 PROCEDURE — 1036F TOBACCO NON-USER: CPT | Performed by: SURGERY

## 2020-08-21 NOTE — PROGRESS NOTES
BARIATRIC CONSULT-INITIAL - BARIATRIC SURGERY  Trent Venegas 38 y o  female MRN: [id number]  Unit/Bed#:  Encounter: [id number]      HPI:  Trent Venegas is a 38 y o  female who presents with morbid obesity to discuss weight loss options  Review of Systems    Historical Information   Past Medical History:   Diagnosis Date    Arthritis     Asthma     GERD (gastroesophageal reflux disease)     Migraine with aura     Osteoarthritis     Seasonal allergies      Past Surgical History:   Procedure Laterality Date    ANKLE SURGERY      ANKLE STABALIZATION    CHOLECYSTECTOMY  2015    TOOTH EXTRACTION      TOTAL HIP ARTHROPLASTY Left      Social History   Social History     Substance and Sexual Activity   Alcohol Use Yes    Frequency: Never    Drinks per session: 1 or 2    Binge frequency: Never    Comment: SOCIAL     Social History     Substance and Sexual Activity   Drug Use No     Social History     Tobacco Use   Smoking Status Never Smoker   Smokeless Tobacco Never Used     Family History: non-contributory    Meds/Allergies   all medications and allergies reviewed  No Known Allergies    Objective       Current Vitals:   Blood Pressure: 150/72 (08/21/20 1422)  Pulse: 92 (08/21/20 1422)  Temperature: 98 2 °F (36 8 °C) (08/21/20 1422)  Temp Source: Temporal (08/21/20 1422)  Height: 5' 6 93" (170 cm) (08/21/20 1422)  Weight - Scale: 117 kg (257 lb 8 oz) (08/21/20 1422)  Body mass index is 40 41 kg/m²  Invasive Devices     None                 Physical Exam    Lab Results: I have personally reviewed pertinent lab results  Imaging: I have personally reviewed pertinent reports  EKG, Pathology, and Other Studies: I have personally reviewed pertinent reports        Code Status: [unfilled]  Advance Directive and Living Will:      Power of :    POLST:      Assessment/PLAN:            Patient has a long history of morbid obesity and is presenting to discuss the surgical weight loss options  Despite the patient best efforts patient was unable to lose any meaningful or sustainable weight using nonsurgical means  We had a long discussion regarding all the surgical weight-loss options at our disposal at this point and reviewed the risks and benefits of each procedure in details as it relates to her age, BMI and medical conditions  Patient elected to undergo a sleeve vs gastric bypass    Risks and benefits were explained to the patient  We also discussed the importance and need of a preoperative workup to make sure that the patient can undergo the procedure safely  Preoperative workup includes sleep apnea screening, cardiac evaluation, nutrition/psych and preoperative EGD  Risks and benefits of all the preoperative diagnostic tests were discussed with the patient including but not limited to the upper endoscopy  Alternatives to surgery and alternative forms of surgery were also explained  Postsurgical commitment and aftercare programs were discussed and explained to the patient in details   In terms of comorbidities patient suffers mostly of   Past Medical History:   Diagnosis Date    Arthritis     Asthma     GERD (gastroesophageal reflux disease)     Migraine with aura     Osteoarthritis     Seasonal allergies        I informed the patient that the rate of resolution of comorbid conditions following weight loss surgery is between 60 and 90% depending on the severity of the specific medical condition  I discussed and educated the patient regarding the different components of our multidisciplinary program and the importance of compliance and follow-up in the postoperative period  All questions answered  Patient understands risks and benefits  A videotape of the procedure was also shown to the patient   After showing the video we discussed all the technical aspects of the procedure and also the potential complications including but not limited to gastrointestinal perforation, leak, obstruction, stricture and hemorrhage  I spent 45 min with the patient more than 50% of the time was spent educating the patient and coordinating care

## 2020-08-24 LAB
LAB AP GYN PRIMARY INTERPRETATION: NORMAL
Lab: NORMAL

## 2020-09-14 ENCOUNTER — HOSPITAL ENCOUNTER (OUTPATIENT)
Dept: MAMMOGRAPHY | Facility: MEDICAL CENTER | Age: 40
Discharge: HOME | End: 2020-09-14
Payer: COMMERCIAL

## 2020-09-14 VITALS — BODY MASS INDEX: 40.34 KG/M2 | WEIGHT: 257 LBS | HEIGHT: 67 IN

## 2020-09-14 DIAGNOSIS — Z12.31 ENCOUNTER FOR SCREENING MAMMOGRAM FOR MALIGNANT NEOPLASM OF BREAST: ICD-10-CM

## 2020-09-14 PROCEDURE — 77063 BREAST TOMOSYNTHESIS BI: CPT

## 2020-09-14 PROCEDURE — 77067 SCR MAMMO BI INCL CAD: CPT

## 2020-09-22 NOTE — PROGRESS NOTES
Bariatric Follow Up Nutrition Note    Preop  6 Month Program: 2 of 6 today    Type of surgery  Preop  Surgery Date: TBD- Tentative February 2021  Surgeon: Dr Urbano Ellis  38 y o   female  Temp 97 5 °F (36 4 °C)   Wt 117 kg (257 lb 9 6 oz)   BMI 40 65 kg/m²     Cj- [place] Equation: At sedentary activity level:   For weight maintenance: 2231 kcal/day  For 2 lb/wk wt loss: 1231 kcal/day  Protein: 0 8-1 0 g/kg IBW= 58-72 4g/day    Weight on Day of Weight Loss Surgery: goal to maintain around 256lbs  Wt with BMI of 25: 159 3#  Pre-Op Excess Wt: 96 7#    Review of History and Medications   Past Medical History:   Diagnosis Date    Arthritis     Asthma     GERD (gastroesophageal reflux disease)     Migraine with aura     Osteoarthritis     Seasonal allergies      Past Surgical History:   Procedure Laterality Date    ANKLE SURGERY      ANKLE STABALIZATION    CHOLECYSTECTOMY  2015    TOOTH EXTRACTION      TOTAL HIP ARTHROPLASTY Left      Social History     Socioeconomic History    Marital status: Single     Spouse name: Not on file    Number of children: Not on file    Years of education: Not on file    Highest education level: Not on file   Occupational History    Not on file   Social Needs    Financial resource strain: Not on file    Food insecurity     Worry: Not on file     Inability: Not on file    Transportation needs     Medical: Not on file     Non-medical: Not on file   Tobacco Use    Smoking status: Never Smoker    Smokeless tobacco: Never Used   Substance and Sexual Activity    Alcohol use: Yes     Frequency: Never     Drinks per session: 1 or 2     Binge frequency: Never     Comment: SOCIAL    Drug use: No    Sexual activity: Not Currently     Birth control/protection: Ring   Lifestyle    Physical activity     Days per week: Not on file     Minutes per session: Not on file    Stress: Not on file   Relationships    Social connections Talks on phone: Not on file     Gets together: Not on file     Attends Caodaism service: Not on file     Active member of club or organization: Not on file     Attends meetings of clubs or organizations: Not on file     Relationship status: Not on file    Intimate partner violence     Fear of current or ex partner: Not on file     Emotionally abused: Not on file     Physically abused: Not on file     Forced sexual activity: Not on file   Other Topics Concern    Not on file   Social History Narrative    DAILY COFFEE CONSUMPTION (0) CUPS A DAY    SEDENTARY LIFESTYLE       Current Outpatient Medications:     Cetirizine HCl (ZYRTEC PO), Zyrtec, Disp: , Rfl:     etonogestrel-ethinyl estradiol (NuvaRing) 0 12-0 015 MG/24HR vaginal ring, Insert 1 each into the vagina every 28 days, Disp: 4 each, Rfl: 3    fluocinolone (SYNALAR) 0 025 % ointment, APPLY TO SKIN TWICE DAILY, Disp: , Rfl: 2    multivitamin (THERAGRAN) TABS, Take 1 tablet by mouth daily, Disp: , Rfl:     Food Intake and Lifestyle Assessment   Food Intake Assessment completed via usual diet recall  Breakfast: 2 eggs with tomatoes, olives, banana peppers on 2 slice toast or flatbread  Snack: handful unsalted pretzels or single serve bag of trail mix   Lunch: chickpea pasta salad with tomatoes and feta  Snack: 2-3pm: apple or yogurt or handful pretzels  Dinner: 5pm: fish/seafood, vegetable  Snack: frozen grapes, or nondairy ice cream (1 serving)  Beverage intake: water, seltzer, and coffee, decaf tea  Protein supplement: none  Estimated protein intake per day: 30-60g  Estimated fluid intake per day: 16 oz coffee, 30 oz bottles x 2 (60oz), 2 cup hot herbal decaf tea (16oz)  Meals eaten away from home: one meals per week: diner or pizza  Typical meal pattern: 3 meals per day and 2-3 snacks per day  Eating Behaviors: Consumption of high calorie/ high fat foods, Large portion sizes, Frequent snacking/ grazing, Mindless eating, Emotional eating and Craves salty foods  Pt reports she used to eat ice cream nightly but has significant decreased to less than once a week  Pt reports she is trying the 30/60 rule but is finding it difficult as her mouth gets dry  Food allergies or intolerances: No Known Allergies  Cultural or Orthodox considerations: NKFA     Physical Assessment  Physical Activity  Types of exercise: None  PT for R hip twice weekly for 60 minutes  Does exercises herself at home 15-30 minutes most other dasy  Current physical limitations: hip arthritis, previous L hip replacement, R hip pain     Psychosocial Assessment   Support systems: parent(s)  Socioeconomic factors: mother and pt do grocery shopping and cooking  Was working from home for 3 weeks due to [address], now back in her office  Works for [employer]      Nutrition Diagnosis-Continued  Diagnosis: Overweight / Obesity (NC-3 3) and Excessive energy intake (NI-1 5)  Related to: Physical inactivity and Excessive energy intake  As Evidenced by: BMI >25, Excessive energy intake and Unintentional weight gain     Interventions and Teaching   Patient educated on post-op nutrition guidelines  Patient educated and handouts provided    Surgical changes to stomach / GI  Capacity of post-surgery stomach  Diet progression  Adequate hydration  Expected weight loss  Exercise  Suggestions for pre-op diet  Meal planning and preparation  Dietary and lifestyle changes  Possible problems with poor eating habits  Techniques for self monitoring and keeping daily food journal  Potential for food intolerance after surgery, and ways to deal with them including: lactose intolerance, nausea, reflux, vomiting, diarrhea, food intolerance, appetite changes, gas    Education provided to: patient    Barriers to learning: No barriers identified    Readiness to change: preparation and action    Comprehension: needs reinforcement and verbalizes understanding     Expected Compliance: good    Evaluation/Monitoring   Eating pattern as discussed Tolerance of nutrition prescription Body weight Lab values Physical activity    Goals  Food journal, Exercise 30 minutes 5 times per week, Complete lession plans 1-6 and Eat 3 meals per day     Workflow:   PCP Letter: not done yet   Support Group: not done yet   6 Month Pre-Operative Program: 2 of 6 today  Will be done in January   Bloodwork: CMP, Lipids, TSH resulted 2/1/2020- needs updated  Pt also needs CBC  Orders placed today     Cardiac Risk Assessment: needs to schedule   EGD scheduled for 11/11/2020   Weight Loss: maintain around 256#       Time Spent:   30 Minutes

## 2020-09-23 ENCOUNTER — OFFICE VISIT (OUTPATIENT)
Dept: BARIATRICS | Facility: CLINIC | Age: 40
End: 2020-09-23

## 2020-09-23 VITALS — TEMPERATURE: 97.5 F | BODY MASS INDEX: 40.65 KG/M2 | WEIGHT: 257.6 LBS

## 2020-09-23 DIAGNOSIS — E66.01 MORBID (SEVERE) OBESITY DUE TO EXCESS CALORIES (HCC): Primary | ICD-10-CM

## 2020-09-23 DIAGNOSIS — K21.9 GERD (GASTROESOPHAGEAL REFLUX DISEASE): ICD-10-CM

## 2020-09-23 DIAGNOSIS — R53.83 FATIGUE: ICD-10-CM

## 2020-09-23 DIAGNOSIS — G43.109 MIGRAINE WITH AURA AND WITHOUT STATUS MIGRAINOSUS, NOT INTRACTABLE: ICD-10-CM

## 2020-09-23 DIAGNOSIS — M16.0 PRIMARY OSTEOARTHRITIS OF BOTH HIPS: ICD-10-CM

## 2020-09-23 PROCEDURE — RECHECK: Performed by: DIETITIAN, REGISTERED

## 2020-10-02 ENCOUNTER — OFFICE VISIT (OUTPATIENT)
Dept: FAMILY MEDICINE CLINIC | Facility: CLINIC | Age: 40
End: 2020-10-02
Payer: COMMERCIAL

## 2020-10-02 VITALS
OXYGEN SATURATION: 95 % | DIASTOLIC BLOOD PRESSURE: 88 MMHG | BODY MASS INDEX: 41.15 KG/M2 | HEART RATE: 86 BPM | RESPIRATION RATE: 16 BRPM | SYSTOLIC BLOOD PRESSURE: 148 MMHG | HEIGHT: 67 IN | WEIGHT: 262.2 LBS | TEMPERATURE: 98.4 F

## 2020-10-02 DIAGNOSIS — R30.0 BURNING WITH URINATION: Primary | ICD-10-CM

## 2020-10-02 DIAGNOSIS — N34.2 INFECTIVE URETHRITIS: ICD-10-CM

## 2020-10-02 LAB
SL AMB  POCT GLUCOSE, UA: ABNORMAL
SL AMB LEUKOCYTE ESTERASE,UA: ABNORMAL
SL AMB POCT BILIRUBIN,UA: ABNORMAL
SL AMB POCT BLOOD,UA: ABNORMAL
SL AMB POCT CLARITY,UA: ABNORMAL
SL AMB POCT COLOR,UA: YELLOW
SL AMB POCT KETONES,UA: ABNORMAL
SL AMB POCT NITRITE,UA: ABNORMAL
SL AMB POCT PH,UA: 6
SL AMB POCT SPECIFIC GRAVITY,UA: 1.01
SL AMB POCT URINE PROTEIN: ABNORMAL
SL AMB POCT UROBILINOGEN: ABNORMAL

## 2020-10-02 PROCEDURE — 81003 URINALYSIS AUTO W/O SCOPE: CPT | Performed by: FAMILY MEDICINE

## 2020-10-02 PROCEDURE — 99213 OFFICE O/P EST LOW 20 MIN: CPT | Performed by: FAMILY MEDICINE

## 2020-10-02 PROCEDURE — 1036F TOBACCO NON-USER: CPT | Performed by: FAMILY MEDICINE

## 2020-10-02 RX ORDER — SULFAMETHOXAZOLE AND TRIMETHOPRIM 800; 160 MG/1; MG/1
1 TABLET ORAL EVERY 12 HOURS SCHEDULED
Qty: 10 TABLET | Refills: 0 | Status: SHIPPED | OUTPATIENT
Start: 2020-10-02 | End: 2020-10-07

## 2020-10-15 ENCOUNTER — LAB (OUTPATIENT)
Dept: LAB | Facility: HOSPITAL | Age: 40
End: 2020-10-15
Payer: COMMERCIAL

## 2020-10-15 ENCOUNTER — TELEPHONE (OUTPATIENT)
Dept: OBGYN CLINIC | Facility: CLINIC | Age: 40
End: 2020-10-15

## 2020-10-15 DIAGNOSIS — R30.0 BURNING WITH URINATION: Primary | ICD-10-CM

## 2020-10-15 DIAGNOSIS — R30.0 BURNING WITH URINATION: ICD-10-CM

## 2020-10-15 LAB
BACTERIA UR QL AUTO: ABNORMAL /HPF
BILIRUB UR QL STRIP: NEGATIVE
CLARITY UR: CLEAR
COLOR UR: YELLOW
GLUCOSE UR STRIP-MCNC: NEGATIVE MG/DL
HGB UR QL STRIP.AUTO: ABNORMAL
KETONES UR STRIP-MCNC: NEGATIVE MG/DL
LEUKOCYTE ESTERASE UR QL STRIP: ABNORMAL
NITRITE UR QL STRIP: NEGATIVE
NON-SQ EPI CELLS URNS QL MICRO: ABNORMAL /HPF
PH UR STRIP.AUTO: 5.5 [PH]
PROT UR STRIP-MCNC: NEGATIVE MG/DL
RBC #/AREA URNS AUTO: ABNORMAL /HPF
SP GR UR STRIP.AUTO: 1.02 (ref 1–1.03)
UROBILINOGEN UR QL STRIP.AUTO: 0.2 E.U./DL
WBC #/AREA URNS AUTO: ABNORMAL /HPF

## 2020-10-15 PROCEDURE — 81001 URINALYSIS AUTO W/SCOPE: CPT

## 2020-10-15 PROCEDURE — 87086 URINE CULTURE/COLONY COUNT: CPT

## 2020-10-15 RX ORDER — SULFAMETHOXAZOLE AND TRIMETHOPRIM 800; 160 MG/1; MG/1
TABLET ORAL
Qty: 6 TABLET | Refills: 0 | Status: SHIPPED | OUTPATIENT
Start: 2020-10-15 | End: 2020-10-18

## 2020-10-16 LAB — BACTERIA UR CULT: NORMAL

## 2020-10-24 ENCOUNTER — LAB (OUTPATIENT)
Dept: LAB | Facility: HOSPITAL | Age: 40
End: 2020-10-24
Attending: SURGERY
Payer: COMMERCIAL

## 2020-10-24 DIAGNOSIS — M16.0 PRIMARY OSTEOARTHRITIS OF BOTH HIPS: ICD-10-CM

## 2020-10-24 DIAGNOSIS — G43.109 MIGRAINE WITH AURA AND WITHOUT STATUS MIGRAINOSUS, NOT INTRACTABLE: ICD-10-CM

## 2020-10-24 DIAGNOSIS — E66.01 MORBID (SEVERE) OBESITY DUE TO EXCESS CALORIES (HCC): ICD-10-CM

## 2020-10-24 DIAGNOSIS — R53.83 FATIGUE: ICD-10-CM

## 2020-10-24 DIAGNOSIS — K21.9 GERD (GASTROESOPHAGEAL REFLUX DISEASE): ICD-10-CM

## 2020-10-24 LAB
ALBUMIN SERPL BCP-MCNC: 3.4 G/DL (ref 3.5–5)
ALP SERPL-CCNC: 60 U/L (ref 46–116)
ALT SERPL W P-5'-P-CCNC: 31 U/L (ref 12–78)
ANION GAP SERPL CALCULATED.3IONS-SCNC: 5 MMOL/L (ref 4–13)
AST SERPL W P-5'-P-CCNC: 20 U/L (ref 5–45)
BILIRUB SERPL-MCNC: 0.38 MG/DL (ref 0.2–1)
BUN SERPL-MCNC: 9 MG/DL (ref 5–25)
CALCIUM ALBUM COR SERPL-MCNC: 9.8 MG/DL (ref 8.3–10.1)
CALCIUM SERPL-MCNC: 9.3 MG/DL (ref 8.3–10.1)
CHLORIDE SERPL-SCNC: 111 MMOL/L (ref 100–108)
CHOLEST SERPL-MCNC: 197 MG/DL (ref 50–200)
CO2 SERPL-SCNC: 25 MMOL/L (ref 21–32)
CREAT SERPL-MCNC: 0.92 MG/DL (ref 0.6–1.3)
ERYTHROCYTE [DISTWIDTH] IN BLOOD BY AUTOMATED COUNT: 14.1 % (ref 11.6–15.1)
GFR SERPL CREATININE-BSD FRML MDRD: 78 ML/MIN/1.73SQ M
GLUCOSE P FAST SERPL-MCNC: 89 MG/DL (ref 65–99)
HCT VFR BLD AUTO: 41.5 % (ref 34.8–46.1)
HDLC SERPL-MCNC: 91 MG/DL
HGB BLD-MCNC: 13.4 G/DL (ref 11.5–15.4)
LDLC SERPL CALC-MCNC: 81 MG/DL (ref 0–100)
MCH RBC QN AUTO: 27.6 PG (ref 26.8–34.3)
MCHC RBC AUTO-ENTMCNC: 32.3 G/DL (ref 31.4–37.4)
MCV RBC AUTO: 85 FL (ref 82–98)
NONHDLC SERPL-MCNC: 106 MG/DL
PLATELET # BLD AUTO: 363 THOUSANDS/UL (ref 149–390)
PMV BLD AUTO: 9.8 FL (ref 8.9–12.7)
POTASSIUM SERPL-SCNC: 3.9 MMOL/L (ref 3.5–5.3)
PROT SERPL-MCNC: 7.6 G/DL (ref 6.4–8.2)
RBC # BLD AUTO: 4.86 MILLION/UL (ref 3.81–5.12)
SODIUM SERPL-SCNC: 141 MMOL/L (ref 136–145)
TRIGL SERPL-MCNC: 127 MG/DL
TSH SERPL DL<=0.05 MIU/L-ACNC: 1.09 UIU/ML (ref 0.36–3.74)
WBC # BLD AUTO: 8.45 THOUSAND/UL (ref 4.31–10.16)

## 2020-10-24 PROCEDURE — 84443 ASSAY THYROID STIM HORMONE: CPT

## 2020-10-24 PROCEDURE — 85027 COMPLETE CBC AUTOMATED: CPT

## 2020-10-24 PROCEDURE — 80061 LIPID PANEL: CPT

## 2020-10-24 PROCEDURE — 36415 COLL VENOUS BLD VENIPUNCTURE: CPT

## 2020-10-24 PROCEDURE — 80053 COMPREHEN METABOLIC PANEL: CPT

## 2020-10-26 ENCOUNTER — OFFICE VISIT (OUTPATIENT)
Dept: BARIATRICS | Facility: CLINIC | Age: 40
End: 2020-10-26
Payer: COMMERCIAL

## 2020-10-26 VITALS
BODY MASS INDEX: 40.16 KG/M2 | WEIGHT: 255.9 LBS | RESPIRATION RATE: 20 BRPM | SYSTOLIC BLOOD PRESSURE: 140 MMHG | HEART RATE: 92 BPM | HEIGHT: 67 IN | DIASTOLIC BLOOD PRESSURE: 90 MMHG | TEMPERATURE: 96.2 F

## 2020-10-26 DIAGNOSIS — E66.01 MORBID OBESITY (HCC): Primary | ICD-10-CM

## 2020-10-26 DIAGNOSIS — E66.01 MORBID (SEVERE) OBESITY DUE TO EXCESS CALORIES (HCC): ICD-10-CM

## 2020-10-26 PROCEDURE — 99213 OFFICE O/P EST LOW 20 MIN: CPT | Performed by: PHYSICIAN ASSISTANT

## 2020-11-08 PROBLEM — Z01.810 PREOPERATIVE CARDIOVASCULAR EXAMINATION: Status: ACTIVE | Noted: 2020-11-08

## 2020-11-09 ENCOUNTER — CONSULT (OUTPATIENT)
Dept: CARDIOLOGY CLINIC | Facility: CLINIC | Age: 40
End: 2020-11-09
Payer: COMMERCIAL

## 2020-11-09 VITALS
HEART RATE: 75 BPM | DIASTOLIC BLOOD PRESSURE: 70 MMHG | SYSTOLIC BLOOD PRESSURE: 138 MMHG | BODY MASS INDEX: 40.4 KG/M2 | WEIGHT: 257.4 LBS | TEMPERATURE: 96.8 F | HEIGHT: 67 IN

## 2020-11-09 DIAGNOSIS — Z01.810 PREOPERATIVE CARDIOVASCULAR EXAMINATION: Primary | ICD-10-CM

## 2020-11-09 DIAGNOSIS — E66.01 MORBID (SEVERE) OBESITY DUE TO EXCESS CALORIES (HCC): ICD-10-CM

## 2020-11-09 DIAGNOSIS — E66.01 MORBID OBESITY (HCC): ICD-10-CM

## 2020-11-09 PROCEDURE — 99243 OFF/OP CNSLTJ NEW/EST LOW 30: CPT | Performed by: INTERNAL MEDICINE

## 2020-11-09 PROCEDURE — 93000 ELECTROCARDIOGRAM COMPLETE: CPT | Performed by: INTERNAL MEDICINE

## 2020-11-10 ENCOUNTER — ANESTHESIA EVENT (OUTPATIENT)
Dept: GASTROENTEROLOGY | Facility: HOSPITAL | Age: 40
End: 2020-11-10

## 2020-11-11 ENCOUNTER — ANESTHESIA (OUTPATIENT)
Dept: GASTROENTEROLOGY | Facility: HOSPITAL | Age: 40
End: 2020-11-11

## 2020-11-11 ENCOUNTER — HOSPITAL ENCOUNTER (OUTPATIENT)
Dept: GASTROENTEROLOGY | Facility: HOSPITAL | Age: 40
Setting detail: OUTPATIENT SURGERY
Discharge: HOME | End: 2020-11-11
Attending: SURGERY | Admitting: SURGERY
Payer: COMMERCIAL

## 2020-11-11 VITALS
RESPIRATION RATE: 18 BRPM | OXYGEN SATURATION: 98 % | HEART RATE: 70 BPM | SYSTOLIC BLOOD PRESSURE: 137 MMHG | DIASTOLIC BLOOD PRESSURE: 74 MMHG | TEMPERATURE: 97.4 F

## 2020-11-11 VITALS — HEART RATE: 76 BPM

## 2020-11-11 DIAGNOSIS — K21.00 GASTROESOPHAGEAL REFLUX DISEASE WITH ESOPHAGITIS WITHOUT HEMORRHAGE: Primary | ICD-10-CM

## 2020-11-11 DIAGNOSIS — E66.01 MORBID OBESITY (HCC): ICD-10-CM

## 2020-11-11 LAB
EXT PREGNANCY TEST URINE: NEGATIVE
EXT. CONTROL: NORMAL

## 2020-11-11 PROCEDURE — 43239 EGD BIOPSY SINGLE/MULTIPLE: CPT | Performed by: SURGERY

## 2020-11-11 PROCEDURE — 88305 TISSUE EXAM BY PATHOLOGIST: CPT | Performed by: PATHOLOGY

## 2020-11-11 PROCEDURE — 81025 URINE PREGNANCY TEST: CPT | Performed by: ANESTHESIOLOGY

## 2020-11-11 RX ORDER — PROPOFOL 10 MG/ML
INJECTION, EMULSION INTRAVENOUS AS NEEDED
Status: DISCONTINUED | OUTPATIENT
Start: 2020-11-11 | End: 2020-11-11

## 2020-11-11 RX ORDER — SODIUM CHLORIDE 9 MG/ML
125 INJECTION, SOLUTION INTRAVENOUS CONTINUOUS
Status: DISCONTINUED | OUTPATIENT
Start: 2020-11-11 | End: 2020-11-15 | Stop reason: HOSPADM

## 2020-11-11 RX ORDER — LIDOCAINE HYDROCHLORIDE 20 MG/ML
INJECTION, SOLUTION EPIDURAL; INFILTRATION; INTRACAUDAL; PERINEURAL AS NEEDED
Status: DISCONTINUED | OUTPATIENT
Start: 2020-11-11 | End: 2020-11-11

## 2020-11-11 RX ADMIN — PROPOFOL 30 MG: 10 INJECTION, EMULSION INTRAVENOUS at 07:54

## 2020-11-11 RX ADMIN — PROPOFOL 170 MG: 10 INJECTION, EMULSION INTRAVENOUS at 07:52

## 2020-11-11 RX ADMIN — SODIUM CHLORIDE: 0.9 INJECTION, SOLUTION INTRAVENOUS at 07:48

## 2020-11-11 RX ADMIN — LIDOCAINE HYDROCHLORIDE 100 MG: 20 INJECTION, SOLUTION EPIDURAL; INFILTRATION; INTRACAUDAL; PERINEURAL at 07:52

## 2020-11-21 ENCOUNTER — APPOINTMENT (OUTPATIENT)
Dept: LAB | Facility: HOSPITAL | Age: 40
End: 2020-11-21
Payer: COMMERCIAL

## 2020-11-21 ENCOUNTER — TRANSCRIBE ORDERS (OUTPATIENT)
Dept: LAB | Facility: HOSPITAL | Age: 40
End: 2020-11-21

## 2020-11-21 DIAGNOSIS — E66.01 MORBID OBESITY (HCC): ICD-10-CM

## 2020-11-21 LAB
EST. AVERAGE GLUCOSE BLD GHB EST-MCNC: 117 MG/DL
HBA1C MFR BLD: 5.7 %

## 2020-11-21 PROCEDURE — 83036 HEMOGLOBIN GLYCOSYLATED A1C: CPT

## 2020-11-21 PROCEDURE — 36415 COLL VENOUS BLD VENIPUNCTURE: CPT

## 2020-11-25 ENCOUNTER — OFFICE VISIT (OUTPATIENT)
Dept: BARIATRICS | Facility: CLINIC | Age: 40
End: 2020-11-25
Payer: COMMERCIAL

## 2020-11-25 VITALS
HEIGHT: 67 IN | WEIGHT: 253.8 LBS | BODY MASS INDEX: 39.83 KG/M2 | HEART RATE: 88 BPM | RESPIRATION RATE: 20 BRPM | DIASTOLIC BLOOD PRESSURE: 76 MMHG | SYSTOLIC BLOOD PRESSURE: 140 MMHG | TEMPERATURE: 98 F

## 2020-11-25 DIAGNOSIS — E66.9 OBESITY, CLASS II, BMI 35-39.9: Primary | ICD-10-CM

## 2020-11-25 PROCEDURE — 1036F TOBACCO NON-USER: CPT | Performed by: PHYSICIAN ASSISTANT

## 2020-11-25 PROCEDURE — 99213 OFFICE O/P EST LOW 20 MIN: CPT | Performed by: PHYSICIAN ASSISTANT

## 2020-11-25 PROCEDURE — 3008F BODY MASS INDEX DOCD: CPT | Performed by: PHYSICIAN ASSISTANT

## 2020-12-23 ENCOUNTER — OFFICE VISIT (OUTPATIENT)
Dept: BARIATRICS | Facility: CLINIC | Age: 40
End: 2020-12-23

## 2020-12-23 VITALS — WEIGHT: 257.4 LBS | BODY MASS INDEX: 40.31 KG/M2

## 2020-12-23 DIAGNOSIS — E66.9 OBESITY, CLASS II, BMI 35-39.9: Primary | ICD-10-CM

## 2020-12-23 PROCEDURE — RECHECK

## 2021-01-06 ENCOUNTER — TELEPHONE (OUTPATIENT)
Dept: FAMILY MEDICINE CLINIC | Facility: CLINIC | Age: 41
End: 2021-01-06

## 2021-01-06 NOTE — TELEPHONE ENCOUNTER
Patient was seen 08/17/20 to talk about bariatric surgery  Requesting letter for surgeon stating PCP is aware patient is going to have bariatric surgery  Patient sees Dr Lisa Casey and has been cleared by cardiologist  Denies needing a surgical clearance from PCP currently and surgery not scheduled as of yet

## 2021-01-18 NOTE — PROGRESS NOTES
Bariatric Follow Up Nutrition Note    Preop  6 Month Program: 6 of 6 today    Type of surgery  Preop  Surgery Date: TBD- Tentative February 2021  Surgeon: Dr Sheffield Sever  38 y o   female  Wt 117 kg (257 lb 8 oz)   BMI 40 33 kg/m²     Yaw Levy Equation: At sedentary activity level:   For weight maintenance: 2231 kcal/day  For 2 lb/wk wt loss: 1231 kcal/day  Protein: 0 8-1 0 g/kg IBW= 58-72 4g/day    Weight on Day of Weight Loss Surgery: goal to maintain around 256lbs  Wt with BMI of 25: 159 3#  Pre-Op Excess Wt: 96 7#    Review of History and Medications   Past Medical History:   Diagnosis Date    Arthritis     Asthma     GERD (gastroesophageal reflux disease)     Migraine with aura     Osteoarthritis     Seasonal allergies      Past Surgical History:   Procedure Laterality Date    ANKLE SURGERY      ANKLE STABALIZATION    CHOLECYSTECTOMY  2015    TOOTH EXTRACTION      TOTAL HIP ARTHROPLASTY Left      Social History     Socioeconomic History    Marital status: Single     Spouse name: Not on file    Number of children: Not on file    Years of education: Not on file    Highest education level: Not on file   Occupational History    Not on file   Social Needs    Financial resource strain: Not on file    Food insecurity     Worry: Not on file     Inability: Not on file    Transportation needs     Medical: Not on file     Non-medical: Not on file   Tobacco Use    Smoking status: Never Smoker    Smokeless tobacco: Never Used   Substance and Sexual Activity    Alcohol use: Yes     Frequency: Never     Drinks per session: 1 or 2     Binge frequency: Never     Comment: SOCIAL    Drug use: No    Sexual activity: Not Currently     Birth control/protection: Ring   Lifestyle    Physical activity     Days per week: Not on file     Minutes per session: Not on file    Stress: Not on file   Relationships    Social connections     Talks on phone: Not on file     Gets together: Not on file     Attends Church service: Not on file     Active member of club or organization: Not on file     Attends meetings of clubs or organizations: Not on file     Relationship status: Not on file    Intimate partner violence     Fear of current or ex partner: Not on file     Emotionally abused: Not on file     Physically abused: Not on file     Forced sexual activity: Not on file   Other Topics Concern    Not on file   Social History Narrative    DAILY COFFEE CONSUMPTION (0) CUPS A DAY    SEDENTARY LIFESTYLE       Current Outpatient Medications:     Cetirizine HCl (ZYRTEC PO), Zyrtec, Disp: , Rfl:     etonogestrel-ethinyl estradiol (NuvaRing) 0 12-0 015 MG/24HR vaginal ring, Insert 1 each into the vagina every 28 days, Disp: 4 each, Rfl: 3    fluocinolone (SYNALAR) 0 025 % ointment, APPLY TO SKIN TWICE DAILY, Disp: , Rfl: 2    multivitamin (THERAGRAN) TABS, Take 1 tablet by mouth daily, Disp: , Rfl:     Food Intake and Lifestyle Assessment   Food Intake Assessment completed via usual diet recall  Breakfast: 2 eggs with tomatoes, olives, banana peppers on 2 slice toast or flatbread  Snack: handful unsalted pretzels or single serve bag of trail mix   Lunch: chickpea pasta salad with tomatoes and feta  Snack: 2-3pm: apple or yogurt or handful pretzels  Dinner: 5pm: fish/seafood, vegetable  Snack: frozen grapes, or nondairy ice cream (1 serving)  Beverage intake: water, seltzer, and coffee, decaf tea  Protein supplement: none  Estimated protein intake per day: 30-60g  Estimated fluid intake per day: 16 oz coffee, 30 oz bottles x 2 (60oz), 2 cup hot herbal decaf tea (16oz)  Meals eaten away from home: one meals per week: diner or pizza  Typical meal pattern: 3 meals per day and 2-3 snacks per day  Eating Behaviors:   Consumption of high calorie/ high fat foods, Large portion sizes, Frequent snacking/ grazing, Mindless eating, Emotional eating and Craves salty foods  Pt reports she used to eat ice cream nightly but has significantly decreased to less than once a week  Food allergies or intolerances: No Known Allergies  Cultural or Spiritism considerations: NKFA     Physical Assessment  Physical Activity  Types of exercise: PT for R hip twice weekly for 60 minutes  Does exercises herself at home 15-30 minutes most other days  Pt wants to start going to the [address] across the street from her house once COVID calms down  Current physical limitations: hip arthritis, previous L hip replacement, R hip pain     Psychosocial Assessment   Support systems: parent(s)  Socioeconomic factors: mother and pt do grocery shopping and cooking  Was working from home for 3 weeks due to [address], now back in her office  Works for [employer]      Nutrition Diagnosis-Continued  Diagnosis: Overweight / Obesity (NC-3 3) and Excessive energy intake (NI-1 5)  Related to: Physical inactivity and Excessive energy intake  As Evidenced by: BMI >25, Excessive energy intake and Unintentional weight gain     Interventions and Teaching   Patient educated on post-op nutrition guidelines  Patient educated and handouts provided  Surgical changes to stomach / GI  Capacity of post-surgery stomach  Diet progression  Adequate hydration  Expected weight loss  Exercise  Suggestions for pre-op diet  Meal planning and preparation  Dietary and lifestyle changes  Possible problems with poor eating habits  Techniques for self monitoring and keeping daily food journal  Potential for food intolerance after surgery, and ways to deal with them including: lactose intolerance, nausea, reflux, vomiting, diarrhea, food intolerance, appetite changes, gas  Discussed post-operative vitamin/mineral guidelines and provided bariatric vitamin comparison chart  Provided pt with several sample 300-500 calorie breakfast lunch and dinner sample meals      Education provided to: patient  Barriers to learning: No barriers identified  Readiness to change: preparation and action  Comprehension: needs reinforcement and verbalizes understanding   Expected Compliance: good    Evaluation/Monitoring   Eating pattern as discussed Tolerance of nutrition prescription Body weight Lab values Physical activity    Goals  Food journal, Exercise 30 minutes 5 times per week, Complete lession plans 1-6 and Eat 3 meals per day     Workflow:   Done  Notified  via email       Time Spent:   30 Minutes

## 2021-01-20 ENCOUNTER — OFFICE VISIT (OUTPATIENT)
Dept: BARIATRICS | Facility: CLINIC | Age: 41
End: 2021-01-20

## 2021-01-20 VITALS — WEIGHT: 257.5 LBS | BODY MASS INDEX: 40.33 KG/M2

## 2021-01-20 DIAGNOSIS — E66.9 OBESITY, CLASS II, BMI 35-39.9: Primary | ICD-10-CM

## 2021-01-20 DIAGNOSIS — E66.01 MORBID (SEVERE) OBESITY DUE TO EXCESS CALORIES (HCC): ICD-10-CM

## 2021-01-20 PROCEDURE — RECHECK: Performed by: DIETITIAN, REGISTERED

## 2021-01-25 ENCOUNTER — OFFICE VISIT (OUTPATIENT)
Dept: FAMILY MEDICINE CLINIC | Facility: CLINIC | Age: 41
End: 2021-01-25
Payer: COMMERCIAL

## 2021-01-25 VITALS
OXYGEN SATURATION: 97 % | TEMPERATURE: 97.2 F | BODY MASS INDEX: 41.18 KG/M2 | HEART RATE: 90 BPM | HEIGHT: 67 IN | DIASTOLIC BLOOD PRESSURE: 82 MMHG | WEIGHT: 262.4 LBS | SYSTOLIC BLOOD PRESSURE: 146 MMHG

## 2021-01-25 DIAGNOSIS — M99.04 SACRAL REGION SOMATIC DYSFUNCTION: ICD-10-CM

## 2021-01-25 DIAGNOSIS — M99.03 SEGMENTAL AND SOMATIC DYSFUNCTION OF LUMBAR REGION: ICD-10-CM

## 2021-01-25 DIAGNOSIS — S39.013A STRAIN OF MUSCLE, FASCIA AND TENDON OF PELVIS, INITIAL ENCOUNTER: ICD-10-CM

## 2021-01-25 DIAGNOSIS — M54.50 ACUTE RIGHT-SIDED LOW BACK PAIN WITHOUT SCIATICA: Primary | ICD-10-CM

## 2021-01-25 DIAGNOSIS — S39.012A LUMBAR STRAIN, INITIAL ENCOUNTER: ICD-10-CM

## 2021-01-25 DIAGNOSIS — M99.05 PELVIC SOMATIC DYSFUNCTION: ICD-10-CM

## 2021-01-25 DIAGNOSIS — S39.012A STRAIN, SACRAL, INITIAL ENCOUNTER: ICD-10-CM

## 2021-01-25 PROCEDURE — 1036F TOBACCO NON-USER: CPT | Performed by: FAMILY MEDICINE

## 2021-01-25 PROCEDURE — 3008F BODY MASS INDEX DOCD: CPT | Performed by: FAMILY MEDICINE

## 2021-01-25 PROCEDURE — 98926 OSTEOPATH MANJ 3-4 REGIONS: CPT | Performed by: FAMILY MEDICINE

## 2021-01-25 PROCEDURE — 99214 OFFICE O/P EST MOD 30 MIN: CPT | Performed by: FAMILY MEDICINE

## 2021-01-25 NOTE — PROGRESS NOTES
OMT  Performed by: Misha Petty DO  Authorized by: Misha Petty,      Verbal consent obtained?: Yes    Risks and benefits: Risks, benefits and alternatives were discussed    Consent given by:  Patient  Procedure Details:     Region evaluated and treated:  Lumbar, Sacrum/Pelvis and Pelvis Innominate    Lumbar details:     Examination Method:  Asymmetry, Misalignment, Crepitation, Defects, Masses and Tenderness, Pain    Severity:  Mild    Treatment Method:  High Velocity, Low Amplitude Treatment    Response:  Improved - The somatic dysfunction is improved but not completely resolved  Sacrum/Pelvis details:     Examination Method:  Asymmetry, Misalignment, Crepitation, Defects, Masses and Tenderness, Pain    Severity:  Mild    Treatment Method:  High Velocity, Low Amplitude Treatment    Response:  Improved - The somatic dysfunction is improved but not completely resolved  Pelvis Innominate details:     Examination Method:  Asymmetry, Misalignment, Crepitation, Defects, Masses and Tenderness, Pain    Severity:  Mild    Treatment Method:  High Velocity, Low Amplitude Treatment    Response:  Resolved - The somatic dysfunction is completely resolved without evidence of it ever having been present      Total Regions Treated:  3

## 2021-01-25 NOTE — PROGRESS NOTES
Chief Complaint   Patient presents with    Back Pain     x 3 weeks/right side/used ice and advil      Assessment/Plan:  Foot lifts and follow up  Short 3 - 5 mm on right  BMI Counseling: Body mass index is 41 1 kg/m²  The BMI is above normal  Nutrition recommendations include moderation in carbohydrate intake and increasing intake of lean protein  Diagnoses and all orders for this visit:    Acute right-sided low back pain without sciatica    Pelvic somatic dysfunction    Strain of muscle, fascia and tendon of pelvis, initial encounter    Sacral region somatic dysfunction    Strain, sacral, initial encounter    Lumbar strain, initial encounter    Segmental and somatic dysfunction of lumbar region          Subjective:      Patient ID: Raymundo Hewitt is a 38 y o  female  RLB pain  Was sitting in a chair crooked when pain started  No radicular  PSH: LTH replacement  Ins didn't cover LL study  The following portions of the patient's history were reviewed and updated as appropriate: allergies, current medications, past family history, past medical history, past social history, past surgical history and problem list     Review of Systems   Constitutional: Negative  Musculoskeletal: Positive for back pain  Skin: Negative  Neurological: Negative  Psychiatric/Behavioral: Negative            Objective:      /82 (BP Location: Left arm, Patient Position: Sitting, Cuff Size: Large)   Pulse 90   Temp (!) 97 2 °F (36 2 °C) (Tympanic)   Ht 5' 7" (1 702 m)   Wt 119 kg (262 lb 6 4 oz)   SpO2 97%   BMI 41 10 kg/m²       Current Outpatient Medications:     Cetirizine HCl (ZYRTEC PO), Zyrtec, Disp: , Rfl:     etonogestrel-ethinyl estradiol (NuvaRing) 0 12-0 015 MG/24HR vaginal ring, Insert 1 each into the vagina every 28 days, Disp: 4 each, Rfl: 3    fluocinolone (SYNALAR) 0 025 % ointment, APPLY TO SKIN TWICE DAILY, Disp: , Rfl: 2    multivitamin (THERAGRAN) TABS, Take 1 tablet by mouth daily, Disp: , Rfl:     No Known Allergies    Past Surgical History:   Procedure Laterality Date    ANKLE SURGERY      ANKLE STABALIZATION    CHOLECYSTECTOMY  2015    TOOTH EXTRACTION      TOTAL HIP ARTHROPLASTY Left           Physical Exam  Constitutional:       Appearance: Normal appearance  Musculoskeletal:      Comments: Stand: Inferior right innominate  Prone; Inferior left psis, sacrum: rotated right, L4 rotated right  Areas tender on right  Skin:     General: Skin is warm and dry  Neurological:      General: No focal deficit present  Mental Status: She is alert and oriented to person, place, and time  Psychiatric:         Mood and Affect: Mood normal          Thought Content:  Thought content normal          Judgment: Judgment normal

## 2021-02-18 ENCOUNTER — CLINICAL SUPPORT (OUTPATIENT)
Dept: BARIATRICS | Facility: CLINIC | Age: 41
End: 2021-02-18

## 2021-02-18 DIAGNOSIS — E66.01 OBESITY, CLASS III, BMI 40-49.9 (MORBID OBESITY) (HCC): Primary | ICD-10-CM

## 2021-02-18 PROCEDURE — RECHECK: Performed by: DIETITIAN, REGISTERED

## 2021-02-18 NOTE — PROGRESS NOTES
Weight Management Nutrition Note        Bariatric Surgeon: Dr Mayi Rene    Surgery: pre op sleeve     Class: Pre Op Class     Topics discussed today include:     Pt attended VIRTUAL pre-op education session  Standardized packet of information for bariatric surgery was sent via email and was reviewed with pt  Importance of lifestyle change and development of regular exercise routine stressed  Pt given the opportunity to ask questions  Ensure pre-surgery drink instructions were given  Questions were answered  Pt verbalized understanding of all information provided  Pt appeared prepared for upcoming surgery  Pt  educated on two-week pre operative liver shrinking diet  Pt understands that the diet needs to be followed for 2 weeks prior to surgery  Handout reviewed  Emphasized the need to drink 80 ounces of fluid per day while on the diet  Reviewed pre-op ERAS drink, post-operative clear liquid, full liquid, and pureed diet, post-operative nutrition rules and facts, and post-operative bariatric multivitamin/mineral recommendations and brand comparison  Contact information provided for any questions/concerns  Patient was able to verbalize basic diet (protein, fluid, vitamin and mineral) recommendations and possible nutrition-related complications   Yes

## 2021-02-22 ENCOUNTER — TELEPHONE (OUTPATIENT)
Dept: BARIATRICS | Facility: CLINIC | Age: 41
End: 2021-02-22

## 2021-02-23 DIAGNOSIS — E66.01 MORBID OBESITY (HCC): ICD-10-CM

## 2021-02-23 PROCEDURE — U0005 INFEC AGEN DETEC AMPLI PROBE: HCPCS | Performed by: SURGERY

## 2021-02-23 PROCEDURE — U0003 INFECTIOUS AGENT DETECTION BY NUCLEIC ACID (DNA OR RNA); SEVERE ACUTE RESPIRATORY SYNDROME CORONAVIRUS 2 (SARS-COV-2) (CORONAVIRUS DISEASE [COVID-19]), AMPLIFIED PROBE TECHNIQUE, MAKING USE OF HIGH THROUGHPUT TECHNOLOGIES AS DESCRIBED BY CMS-2020-01-R: HCPCS | Performed by: SURGERY

## 2021-02-23 NOTE — PRE-PROCEDURE INSTRUCTIONS
Pre-Surgery Instructions:   Medication Instructions    Cetirizine HCl (ZYRTEC PO) Instructed patient per Anesthesia Guidelines   etonogestrel-ethinyl estradiol (NuvaRing) 0 12-0 015 MG/24HR vaginal ring Instructed patient per Anesthesia Guidelines   fluocinolone (SYNALAR) 0 025 % ointment Instructed patient per Anesthesia Guidelines   multivitamin (THERAGRAN) TABS Instructed patient per Anesthesia Guidelines   TURMERIC PO Instructed patient per Anesthesia Guidelines  Instructed has no medications to take the morning of surgery  No aspirin, NSAIDs, vitamins, or supplements 1 week before surgery

## 2021-02-24 LAB — SARS-COV-2 RNA RESP QL NAA+PROBE: NEGATIVE

## 2021-02-25 ENCOUNTER — OFFICE VISIT (OUTPATIENT)
Dept: BARIATRICS | Facility: CLINIC | Age: 41
End: 2021-02-25
Payer: COMMERCIAL

## 2021-02-25 VITALS
SYSTOLIC BLOOD PRESSURE: 124 MMHG | RESPIRATION RATE: 20 BRPM | DIASTOLIC BLOOD PRESSURE: 80 MMHG | BODY MASS INDEX: 40.42 KG/M2 | HEART RATE: 91 BPM | WEIGHT: 257.5 LBS | TEMPERATURE: 97.9 F | HEIGHT: 67 IN

## 2021-02-25 DIAGNOSIS — E66.01 MORBID (SEVERE) OBESITY DUE TO EXCESS CALORIES (HCC): Primary | ICD-10-CM

## 2021-02-25 PROCEDURE — 1036F TOBACCO NON-USER: CPT | Performed by: SURGERY

## 2021-02-25 PROCEDURE — 99213 OFFICE O/P EST LOW 20 MIN: CPT | Performed by: SURGERY

## 2021-02-25 RX ORDER — ACETAMINOPHEN 325 MG/1
975 TABLET ORAL ONCE
Status: CANCELLED | OUTPATIENT
Start: 2021-03-02 | End: 2021-02-25

## 2021-02-25 RX ORDER — SCOLOPAMINE TRANSDERMAL SYSTEM 1 MG/1
1 PATCH, EXTENDED RELEASE TRANSDERMAL ONCE
Status: CANCELLED | OUTPATIENT
Start: 2021-03-02 | End: 2021-02-25

## 2021-02-25 RX ORDER — CELECOXIB 200 MG/1
200 CAPSULE ORAL ONCE
Status: CANCELLED | OUTPATIENT
Start: 2021-03-02 | End: 2021-02-25

## 2021-02-25 RX ORDER — CEFAZOLIN SODIUM 2 G/50ML
2000 SOLUTION INTRAVENOUS ONCE
Status: CANCELLED | OUTPATIENT
Start: 2021-03-02 | End: 2021-02-25

## 2021-02-25 NOTE — PROGRESS NOTES
BARIATRIC HISTORY AND PHYSICAL - BARIATRIC SURGERY  Adriana Bennett 38 y o  female MRN: [id number]  Unit/Bed#:  Encounter: [id number]      HPI:  Adriana Bennett is a 38 y o  female who presents to review their preoperative workup and see if they are a good candidate to undergo a bariatric procedure  Review of Systems   Constitutional: Negative for chills and fever  HENT: Negative for ear pain and sore throat  Eyes: Negative for pain and visual disturbance  Respiratory: Negative for cough and shortness of breath  Cardiovascular: Negative for chest pain and palpitations  Gastrointestinal: Negative for abdominal pain and vomiting  Genitourinary: Negative for dysuria and hematuria  Musculoskeletal: Negative for arthralgias and back pain  Skin: Negative for color change and rash  Neurological: Negative for seizures and syncope  All other systems reviewed and are negative        Historical Information   Past Medical History:   Diagnosis Date    Arthritis     Asthma     allergy induced    Environmental allergies     dust    GERD (gastroesophageal reflux disease)     Migraine with aura     Osteoarthritis     Seasonal allergies     Wears contact lenses     wears at night     Past Surgical History:   Procedure Laterality Date    ANKLE SURGERY Left     ANKLE STABALIZATION    CHOLECYSTECTOMY  2015    COLONOSCOPY      TOOTH EXTRACTION      TOTAL HIP ARTHROPLASTY Left      Social History   Social History     Substance and Sexual Activity   Alcohol Use Yes    Frequency: Monthly or less    Drinks per session: 1 or 2    Binge frequency: Never    Comment: SOCIAL     Social History     Substance and Sexual Activity   Drug Use No     Social History     Tobacco Use   Smoking Status Never Smoker   Smokeless Tobacco Never Used     Family History: non-contributory    Meds/Allergies   all medications and allergies reviewed  No Known Allergies    Objective     Current Vitals:   Blood Pressure: 124/80 (02/25/21 1412)  Pulse: 91 (02/25/21 1412)  Temperature: 97 9 °F (36 6 °C) (02/25/21 1412)  Temp Source: Tympanic (02/25/21 1412)  Respirations: 20 (02/25/21 1412)  Height: 5' 7" (170 2 cm) (02/25/21 1412)  Weight - Scale: 117 kg (257 lb 8 oz) (02/25/21 1412)  bowel movement  [unfilled]    Invasive Devices     None                 Physical Exam  Constitutional:       Appearance: Normal appearance  She is obese  HENT:      Head: Normocephalic and atraumatic  Nose: Nose normal       Mouth/Throat:      Mouth: Mucous membranes are moist    Eyes:      Extraocular Movements: Extraocular movements intact  Pupils: Pupils are equal, round, and reactive to light  Neck:      Musculoskeletal: Normal range of motion  Cardiovascular:      Rate and Rhythm: Normal rate and regular rhythm  Pulses: Normal pulses  Heart sounds: Normal heart sounds  Pulmonary:      Effort: Pulmonary effort is normal       Breath sounds: Normal breath sounds  Abdominal:      Palpations: Abdomen is soft  Skin:     General: Skin is warm  Neurological:      General: No focal deficit present  Mental Status: She is alert and oriented to person, place, and time  Psychiatric:         Mood and Affect: Mood normal          Behavior: Behavior normal          Lab Results: I have personally reviewed pertinent lab results  Imaging: I have personally reviewed pertinent reports  EKG, Pathology, and Other Studies: I have personally reviewed pertinent reports  Code Status: [unfilled]  Advance Directive and Living Will:      Power of :    POLST:        Assessment/Plan:    The patient presented to review the preoperative workup and see if bariatric surgery is appropriate and indicated following the extensive preoperative workup and the enrollment in our weight loss program   Preoperative workup was complete   Results were reviewed with the patient including the blood work results and the endoscopy findings and the biopsy results  We also reviewed the cardiology evaluation  The patient was determined to be a good candidate for Sleeve Gastrectomy  ----------------------------------------------------------------------  Smoking: Denies  Blood thinner use: Denies  CPAP use: No   History of blood clots: Denies  Previous abdominal surgeries: Cholecystectomy, previous hip replacement   Cardiac clearance obtained: 11/9/20 Dr Miguel Hernandez   EGD prior to surgery: 11/11/20 Small sliding hiatal hernia, Mild esophagitis   Tissue exam: negative for h  pylori  Medication allergies: NKDA  Reminded patient about 2 week pre-op liquid diet: Yes  Consent signed: Yes  SLIM Consult postop: No  -----------------------------------------------------------------------  Risks and benefits explained one more time to the patient  Alternatives to surgery and alternative forms of surgery were also explained  Post-surgical commitment and after care programs were explained  We also discussed that the [employer] robot may be available to us to use on the day of the patient procedure and that the procedure may be performed robotically  I discussed in details the advantages and disadvantages of this approach including the potential decrease in postoperative pain because of the remote center technology  I also mentioned the lack of strong evidence for the use of robot in bariatric patients and the potential disadvantage to patients because of the prolonged operative time  Consent was signed  Questions were answered and concerns were addressed  Patient will need to start the 2 week liquid diet prior to surgery  Patient wishes to proceed  As per [address] [place] [address] guidelines, I had a discussion with the patient regarding their CODE STATUS in the perioperative period and the patient is level 1 or FULL CODE STATUS      Emili Patel PA-C  Bariatric Surgery   2/25/2021  2:22 PM

## 2021-02-25 NOTE — H&P (VIEW-ONLY)
BARIATRIC HISTORY AND PHYSICAL - BARIATRIC SURGERY  Dione Alonzo 38 y o  female MRN: [id number]  Unit/Bed#:  Encounter: [id number]      HPI:  Dione Alonzo is a 38 y o  female who presents to review their preoperative workup and see if they are a good candidate to undergo a bariatric procedure  Review of Systems   Constitutional: Negative for chills and fever  HENT: Negative for ear pain and sore throat  Eyes: Negative for pain and visual disturbance  Respiratory: Negative for cough and shortness of breath  Cardiovascular: Negative for chest pain and palpitations  Gastrointestinal: Negative for abdominal pain and vomiting  Genitourinary: Negative for dysuria and hematuria  Musculoskeletal: Negative for arthralgias and back pain  Skin: Negative for color change and rash  Neurological: Negative for seizures and syncope  All other systems reviewed and are negative        Historical Information   Past Medical History:   Diagnosis Date    Arthritis     Asthma     allergy induced    Environmental allergies     dust    GERD (gastroesophageal reflux disease)     Migraine with aura     Osteoarthritis     Seasonal allergies     Wears contact lenses     wears at night     Past Surgical History:   Procedure Laterality Date    ANKLE SURGERY Left     ANKLE STABALIZATION    CHOLECYSTECTOMY  2015    COLONOSCOPY      TOOTH EXTRACTION      TOTAL HIP ARTHROPLASTY Left      Social History   Social History     Substance and Sexual Activity   Alcohol Use Yes    Frequency: Monthly or less    Drinks per session: 1 or 2    Binge frequency: Never    Comment: SOCIAL     Social History     Substance and Sexual Activity   Drug Use No     Social History     Tobacco Use   Smoking Status Never Smoker   Smokeless Tobacco Never Used     Family History: non-contributory    Meds/Allergies   all medications and allergies reviewed  No Known Allergies    Objective     Current Vitals:   Blood Pressure: 124/80 (02/25/21 1412)  Pulse: 91 (02/25/21 1412)  Temperature: 97 9 °F (36 6 °C) (02/25/21 1412)  Temp Source: Tympanic (02/25/21 1412)  Respirations: 20 (02/25/21 1412)  Height: 5' 7" (170 2 cm) (02/25/21 1412)  Weight - Scale: 117 kg (257 lb 8 oz) (02/25/21 1412)  bowel movement  [unfilled]    Invasive Devices     None                 Physical Exam  Constitutional:       Appearance: Normal appearance  She is obese  HENT:      Head: Normocephalic and atraumatic  Nose: Nose normal       Mouth/Throat:      Mouth: Mucous membranes are moist    Eyes:      Extraocular Movements: Extraocular movements intact  Pupils: Pupils are equal, round, and reactive to light  Neck:      Musculoskeletal: Normal range of motion  Cardiovascular:      Rate and Rhythm: Normal rate and regular rhythm  Pulses: Normal pulses  Heart sounds: Normal heart sounds  Pulmonary:      Effort: Pulmonary effort is normal       Breath sounds: Normal breath sounds  Abdominal:      Palpations: Abdomen is soft  Skin:     General: Skin is warm  Neurological:      General: No focal deficit present  Mental Status: She is alert and oriented to person, place, and time  Psychiatric:         Mood and Affect: Mood normal          Behavior: Behavior normal          Lab Results: I have personally reviewed pertinent lab results  Imaging: I have personally reviewed pertinent reports  EKG, Pathology, and Other Studies: I have personally reviewed pertinent reports  Code Status: [unfilled]  Advance Directive and Living Will:      Power of :    POLST:        Assessment/Plan:    The patient presented to review the preoperative workup and see if bariatric surgery is appropriate and indicated following the extensive preoperative workup and the enrollment in our weight loss program   Preoperative workup was complete   Results were reviewed with the patient including the blood work results and the endoscopy findings and the biopsy results  We also reviewed the cardiology evaluation  The patient was determined to be a good candidate for Sleeve Gastrectomy  ----------------------------------------------------------------------  Smoking: Denies  Blood thinner use: Denies  CPAP use: No   History of blood clots: Denies  Previous abdominal surgeries: Cholecystectomy, previous hip replacement   Cardiac clearance obtained: 11/9/20 Dr Magan Abad   EGD prior to surgery: 11/11/20 Small sliding hiatal hernia, Mild esophagitis   Tissue exam: negative for h  pylori  Medication allergies: NKDA  Reminded patient about 2 week pre-op liquid diet: Yes  Consent signed: Yes  SLIM Consult postop: No  -----------------------------------------------------------------------  Risks and benefits explained one more time to the patient  Alternatives to surgery and alternative forms of surgery were also explained  Post-surgical commitment and after care programs were explained  We also discussed that the [employer] robot may be available to us to use on the day of the patient procedure and that the procedure may be performed robotically  I discussed in details the advantages and disadvantages of this approach including the potential decrease in postoperative pain because of the remote center technology  I also mentioned the lack of strong evidence for the use of robot in bariatric patients and the potential disadvantage to patients because of the prolonged operative time  Consent was signed  Questions were answered and concerns were addressed  Patient will need to start the 2 week liquid diet prior to surgery  Patient wishes to proceed  As per [facility] guidelines, I had a discussion with the patient regarding their CODE STATUS in the perioperative period and the patient is level 1 or FULL CODE STATUS      Moi Chambers PA-C  Bariatric Surgery   2/25/2021  2:22 PM

## 2021-02-26 DIAGNOSIS — E66.9 OBESITY, CLASS II, BMI 35-39.9: Primary | ICD-10-CM

## 2021-02-26 RX ORDER — OMEPRAZOLE 20 MG/1
20 CAPSULE, DELAYED RELEASE ORAL DAILY
Qty: 30 CAPSULE | Refills: 3 | Status: SHIPPED | OUTPATIENT
Start: 2021-02-26 | End: 2021-06-22

## 2021-02-26 RX ORDER — OXYCODONE HYDROCHLORIDE 5 MG/1
5 TABLET ORAL EVERY 4 HOURS PRN
Qty: 10 TABLET | Refills: 0 | Status: SHIPPED | OUTPATIENT
Start: 2021-03-02 | End: 2021-06-14 | Stop reason: ALTCHOICE

## 2021-02-27 ENCOUNTER — ANESTHESIA EVENT (OUTPATIENT)
Dept: PERIOP | Facility: HOSPITAL | Age: 41
DRG: 621 | End: 2021-02-27
Payer: COMMERCIAL

## 2021-03-02 ENCOUNTER — HOSPITAL ENCOUNTER (INPATIENT)
Facility: HOSPITAL | Age: 41
LOS: 1 days | Discharge: HOME | DRG: 621 | End: 2021-03-03
Attending: SURGERY | Admitting: SURGERY
Payer: COMMERCIAL

## 2021-03-02 ENCOUNTER — ANESTHESIA (OUTPATIENT)
Dept: PERIOP | Facility: HOSPITAL | Age: 41
DRG: 621 | End: 2021-03-02
Payer: COMMERCIAL

## 2021-03-02 DIAGNOSIS — E66.01 MORBID OBESITY (HCC): ICD-10-CM

## 2021-03-02 LAB
EXT PREGNANCY TEST URINE: NEGATIVE
EXT. CONTROL: NORMAL

## 2021-03-02 PROCEDURE — 43281 LAP PARAESOPHAG HERN REPAIR: CPT | Performed by: STUDENT IN AN ORGANIZED HEALTH CARE EDUCATION/TRAINING PROGRAM

## 2021-03-02 PROCEDURE — 88307 TISSUE EXAM BY PATHOLOGIST: CPT | Performed by: PATHOLOGY

## 2021-03-02 PROCEDURE — 81025 URINE PREGNANCY TEST: CPT | Performed by: ANESTHESIOLOGY

## 2021-03-02 PROCEDURE — 43775 LAP SLEEVE GASTRECTOMY: CPT | Performed by: STUDENT IN AN ORGANIZED HEALTH CARE EDUCATION/TRAINING PROGRAM

## 2021-03-02 PROCEDURE — C9290 INJ, BUPIVACAINE LIPOSOME: HCPCS | Performed by: PHYSICIAN ASSISTANT

## 2021-03-02 PROCEDURE — 43281 LAP PARAESOPHAG HERN REPAIR: CPT | Performed by: SURGERY

## 2021-03-02 PROCEDURE — 43775 LAP SLEEVE GASTRECTOMY: CPT | Performed by: SURGERY

## 2021-03-02 RX ORDER — OXYCODONE HCL 5 MG/5 ML
5 SOLUTION, ORAL ORAL EVERY 4 HOURS PRN
Status: DISCONTINUED | OUTPATIENT
Start: 2021-03-02 | End: 2021-03-03 | Stop reason: HOSPADM

## 2021-03-02 RX ORDER — FENTANYL CITRATE/PF 50 MCG/ML
25 SYRINGE (ML) INJECTION
Status: DISCONTINUED | OUTPATIENT
Start: 2021-03-02 | End: 2021-03-02 | Stop reason: HOSPADM

## 2021-03-02 RX ORDER — DEXAMETHASONE SODIUM PHOSPHATE 4 MG/ML
4 INJECTION, SOLUTION INTRA-ARTICULAR; INTRALESIONAL; INTRAMUSCULAR; INTRAVENOUS; SOFT TISSUE ONCE
Status: COMPLETED | OUTPATIENT
Start: 2021-03-02 | End: 2021-03-02

## 2021-03-02 RX ORDER — LABETALOL 20 MG/4 ML (5 MG/ML) INTRAVENOUS SYRINGE
5 ONCE
Status: COMPLETED | OUTPATIENT
Start: 2021-03-02 | End: 2021-03-02

## 2021-03-02 RX ORDER — FENTANYL CITRATE 50 UG/ML
INJECTION, SOLUTION INTRAMUSCULAR; INTRAVENOUS AS NEEDED
Status: DISCONTINUED | OUTPATIENT
Start: 2021-03-02 | End: 2021-03-02

## 2021-03-02 RX ORDER — SCOLOPAMINE TRANSDERMAL SYSTEM 1 MG/1
1 PATCH, EXTENDED RELEASE TRANSDERMAL ONCE
Status: DISCONTINUED | OUTPATIENT
Start: 2021-03-02 | End: 2021-03-03 | Stop reason: HOSPADM

## 2021-03-02 RX ORDER — HYDROMORPHONE HCL/PF 1 MG/ML
0.5 SYRINGE (ML) INJECTION
Status: DISCONTINUED | OUTPATIENT
Start: 2021-03-02 | End: 2021-03-02 | Stop reason: HOSPADM

## 2021-03-02 RX ORDER — MEPERIDINE HYDROCHLORIDE 25 MG/ML
12.5 INJECTION INTRAMUSCULAR; INTRAVENOUS; SUBCUTANEOUS
Status: DISCONTINUED | OUTPATIENT
Start: 2021-03-02 | End: 2021-03-02 | Stop reason: HOSPADM

## 2021-03-02 RX ORDER — ACETAMINOPHEN 160 MG/5ML
975 SUSPENSION, ORAL (FINAL DOSE FORM) ORAL EVERY 8 HOURS
Status: DISCONTINUED | OUTPATIENT
Start: 2021-03-02 | End: 2021-03-03 | Stop reason: HOSPADM

## 2021-03-02 RX ORDER — NEOSTIGMINE METHYLSULFATE 1 MG/ML
INJECTION INTRAVENOUS AS NEEDED
Status: DISCONTINUED | OUTPATIENT
Start: 2021-03-02 | End: 2021-03-02

## 2021-03-02 RX ORDER — OXYCODONE HCL 5 MG/5 ML
10 SOLUTION, ORAL ORAL EVERY 4 HOURS PRN
Status: DISCONTINUED | OUTPATIENT
Start: 2021-03-02 | End: 2021-03-03 | Stop reason: HOSPADM

## 2021-03-02 RX ORDER — ONDANSETRON 2 MG/ML
4 INJECTION INTRAMUSCULAR; INTRAVENOUS EVERY 6 HOURS PRN
Status: DISCONTINUED | OUTPATIENT
Start: 2021-03-02 | End: 2021-03-03 | Stop reason: HOSPADM

## 2021-03-02 RX ORDER — SODIUM CHLORIDE, SODIUM LACTATE, POTASSIUM CHLORIDE, CALCIUM CHLORIDE 600; 310; 30; 20 MG/100ML; MG/100ML; MG/100ML; MG/100ML
125 INJECTION, SOLUTION INTRAVENOUS CONTINUOUS
Status: DISCONTINUED | OUTPATIENT
Start: 2021-03-02 | End: 2021-03-02

## 2021-03-02 RX ORDER — MIDAZOLAM HYDROCHLORIDE 2 MG/2ML
INJECTION, SOLUTION INTRAMUSCULAR; INTRAVENOUS AS NEEDED
Status: DISCONTINUED | OUTPATIENT
Start: 2021-03-02 | End: 2021-03-02

## 2021-03-02 RX ORDER — BUPIVACAINE HYDROCHLORIDE AND EPINEPHRINE 5; 5 MG/ML; UG/ML
INJECTION, SOLUTION PERINEURAL AS NEEDED
Status: DISCONTINUED | OUTPATIENT
Start: 2021-03-02 | End: 2021-03-02 | Stop reason: HOSPADM

## 2021-03-02 RX ORDER — CELECOXIB 200 MG/1
200 CAPSULE ORAL ONCE
Status: COMPLETED | OUTPATIENT
Start: 2021-03-02 | End: 2021-03-02

## 2021-03-02 RX ORDER — METOCLOPRAMIDE HYDROCHLORIDE 5 MG/ML
10 INJECTION INTRAMUSCULAR; INTRAVENOUS EVERY 6 HOURS PRN
Status: DISCONTINUED | OUTPATIENT
Start: 2021-03-02 | End: 2021-03-03 | Stop reason: HOSPADM

## 2021-03-02 RX ORDER — PROPOFOL 10 MG/ML
INJECTION, EMULSION INTRAVENOUS AS NEEDED
Status: DISCONTINUED | OUTPATIENT
Start: 2021-03-02 | End: 2021-03-02

## 2021-03-02 RX ORDER — ONDANSETRON 2 MG/ML
INJECTION INTRAMUSCULAR; INTRAVENOUS AS NEEDED
Status: DISCONTINUED | OUTPATIENT
Start: 2021-03-02 | End: 2021-03-02

## 2021-03-02 RX ORDER — MORPHINE SULFATE 4 MG/ML
4 INJECTION, SOLUTION INTRAMUSCULAR; INTRAVENOUS EVERY 4 HOURS PRN
Status: DISCONTINUED | OUTPATIENT
Start: 2021-03-02 | End: 2021-03-03 | Stop reason: HOSPADM

## 2021-03-02 RX ORDER — GLYCOPYRROLATE 0.2 MG/ML
INJECTION INTRAMUSCULAR; INTRAVENOUS AS NEEDED
Status: DISCONTINUED | OUTPATIENT
Start: 2021-03-02 | End: 2021-03-02

## 2021-03-02 RX ORDER — SODIUM CHLORIDE, SODIUM LACTATE, POTASSIUM CHLORIDE, CALCIUM CHLORIDE 600; 310; 30; 20 MG/100ML; MG/100ML; MG/100ML; MG/100ML
75 INJECTION, SOLUTION INTRAVENOUS CONTINUOUS
Status: DISCONTINUED | OUTPATIENT
Start: 2021-03-02 | End: 2021-03-03 | Stop reason: HOSPADM

## 2021-03-02 RX ORDER — PROMETHAZINE HYDROCHLORIDE 25 MG/ML
25 INJECTION, SOLUTION INTRAMUSCULAR; INTRAVENOUS EVERY 6 HOURS PRN
Status: DISCONTINUED | OUTPATIENT
Start: 2021-03-02 | End: 2021-03-03 | Stop reason: HOSPADM

## 2021-03-02 RX ORDER — ACETAMINOPHEN 325 MG/1
975 TABLET ORAL ONCE
Status: COMPLETED | OUTPATIENT
Start: 2021-03-02 | End: 2021-03-02

## 2021-03-02 RX ORDER — LIDOCAINE HYDROCHLORIDE 20 MG/ML
INJECTION, SOLUTION EPIDURAL; INFILTRATION; INTRACAUDAL; PERINEURAL AS NEEDED
Status: DISCONTINUED | OUTPATIENT
Start: 2021-03-02 | End: 2021-03-02

## 2021-03-02 RX ORDER — DEXAMETHASONE SODIUM PHOSPHATE 4 MG/ML
INJECTION, SOLUTION INTRA-ARTICULAR; INTRALESIONAL; INTRAMUSCULAR; INTRAVENOUS; SOFT TISSUE AS NEEDED
Status: DISCONTINUED | OUTPATIENT
Start: 2021-03-02 | End: 2021-03-02

## 2021-03-02 RX ORDER — HYDROMORPHONE HCL/PF 1 MG/ML
SYRINGE (ML) INJECTION AS NEEDED
Status: DISCONTINUED | OUTPATIENT
Start: 2021-03-02 | End: 2021-03-02

## 2021-03-02 RX ORDER — CEFAZOLIN SODIUM 2 G/50ML
2000 SOLUTION INTRAVENOUS ONCE
Status: COMPLETED | OUTPATIENT
Start: 2021-03-02 | End: 2021-03-02

## 2021-03-02 RX ORDER — ROCURONIUM BROMIDE 10 MG/ML
INJECTION, SOLUTION INTRAVENOUS AS NEEDED
Status: DISCONTINUED | OUTPATIENT
Start: 2021-03-02 | End: 2021-03-02

## 2021-03-02 RX ORDER — ONDANSETRON 2 MG/ML
4 INJECTION INTRAMUSCULAR; INTRAVENOUS ONCE AS NEEDED
Status: COMPLETED | OUTPATIENT
Start: 2021-03-02 | End: 2021-03-02

## 2021-03-02 RX ORDER — MAGNESIUM HYDROXIDE 1200 MG/15ML
LIQUID ORAL AS NEEDED
Status: DISCONTINUED | OUTPATIENT
Start: 2021-03-02 | End: 2021-03-02 | Stop reason: HOSPADM

## 2021-03-02 RX ADMIN — ROCURONIUM BROMIDE 20 MG: 10 INJECTION, SOLUTION INTRAVENOUS at 10:39

## 2021-03-02 RX ADMIN — DEXAMETHASONE SODIUM PHOSPHATE 4 MG: 4 INJECTION, SOLUTION INTRAMUSCULAR; INTRAVENOUS at 12:36

## 2021-03-02 RX ADMIN — SODIUM CHLORIDE, SODIUM LACTATE, POTASSIUM CHLORIDE, AND CALCIUM CHLORIDE 125 ML/HR: .6; .31; .03; .02 INJECTION, SOLUTION INTRAVENOUS at 08:43

## 2021-03-02 RX ADMIN — ROCURONIUM BROMIDE 50 MG: 10 INJECTION, SOLUTION INTRAVENOUS at 10:12

## 2021-03-02 RX ADMIN — HYDROMORPHONE HYDROCHLORIDE 0.5 MG: 1 INJECTION, SOLUTION INTRAMUSCULAR; INTRAVENOUS; SUBCUTANEOUS at 10:42

## 2021-03-02 RX ADMIN — FENTANYL CITRATE 25 MCG: 50 INJECTION INTRAMUSCULAR; INTRAVENOUS at 13:00

## 2021-03-02 RX ADMIN — ACETAMINOPHEN 975 MG: 325 SUSPENSION ORAL at 21:06

## 2021-03-02 RX ADMIN — LIDOCAINE HYDROCHLORIDE 50 MG: 20 INJECTION, SOLUTION EPIDURAL; INFILTRATION; INTRACAUDAL; PERINEURAL at 10:12

## 2021-03-02 RX ADMIN — SODIUM CHLORIDE, SODIUM LACTATE, POTASSIUM CHLORIDE, AND CALCIUM CHLORIDE: .6; .31; .03; .02 INJECTION, SOLUTION INTRAVENOUS at 10:33

## 2021-03-02 RX ADMIN — SODIUM CHLORIDE, SODIUM LACTATE, POTASSIUM CHLORIDE, AND CALCIUM CHLORIDE 75 ML/HR: .6; .31; .03; .02 INJECTION, SOLUTION INTRAVENOUS at 21:08

## 2021-03-02 RX ADMIN — NEOSTIGMINE METHYLSULFATE 3 MG: 1 INJECTION INTRAVENOUS at 11:51

## 2021-03-02 RX ADMIN — LABETALOL 20 MG/4 ML (5 MG/ML) INTRAVENOUS SYRINGE 5 MG: at 13:48

## 2021-03-02 RX ADMIN — CELECOXIB 200 MG: 200 CAPSULE ORAL at 08:24

## 2021-03-02 RX ADMIN — GLYCOPYRROLATE 0.4 MG: 0.2 INJECTION, SOLUTION INTRAMUSCULAR; INTRAVENOUS at 11:51

## 2021-03-02 RX ADMIN — ACETAMINOPHEN 975 MG: 325 TABLET ORAL at 08:24

## 2021-03-02 RX ADMIN — DEXAMETHASONE SODIUM PHOSPHATE 4 MG: 4 INJECTION INTRA-ARTICULAR; INTRALESIONAL; INTRAMUSCULAR; INTRAVENOUS; SOFT TISSUE at 10:34

## 2021-03-02 RX ADMIN — FENTANYL CITRATE 100 MCG: 50 INJECTION, SOLUTION INTRAMUSCULAR; INTRAVENOUS at 10:12

## 2021-03-02 RX ADMIN — TRIMETHOBENZAMIDE HYDROCHLORIDE 200 MG: 100 INJECTION INTRAMUSCULAR at 12:46

## 2021-03-02 RX ADMIN — FENTANYL CITRATE 100 MCG: 50 INJECTION, SOLUTION INTRAMUSCULAR; INTRAVENOUS at 10:29

## 2021-03-02 RX ADMIN — PROPOFOL 200 MG: 10 INJECTION, EMULSION INTRAVENOUS at 10:12

## 2021-03-02 RX ADMIN — ONDANSETRON 4 MG: 2 INJECTION INTRAMUSCULAR; INTRAVENOUS at 11:52

## 2021-03-02 RX ADMIN — SODIUM CHLORIDE, SODIUM LACTATE, POTASSIUM CHLORIDE, AND CALCIUM CHLORIDE: .6; .31; .03; .02 INJECTION, SOLUTION INTRAVENOUS at 12:09

## 2021-03-02 RX ADMIN — FAMOTIDINE 20 MG: 10 INJECTION INTRAVENOUS at 15:49

## 2021-03-02 RX ADMIN — FENTANYL CITRATE 25 MCG: 50 INJECTION INTRAMUSCULAR; INTRAVENOUS at 13:06

## 2021-03-02 RX ADMIN — SCOPALAMINE 1 PATCH: 1 PATCH, EXTENDED RELEASE TRANSDERMAL at 08:22

## 2021-03-02 RX ADMIN — LABETALOL 20 MG/4 ML (5 MG/ML) INTRAVENOUS SYRINGE 5 MG: at 13:24

## 2021-03-02 RX ADMIN — ENOXAPARIN SODIUM 40 MG: 40 INJECTION SUBCUTANEOUS at 08:36

## 2021-03-02 RX ADMIN — HYDROMORPHONE HYDROCHLORIDE 0.5 MG: 1 INJECTION, SOLUTION INTRAMUSCULAR; INTRAVENOUS; SUBCUTANEOUS at 10:36

## 2021-03-02 RX ADMIN — CEFAZOLIN SODIUM 2000 MG: 2 SOLUTION INTRAVENOUS at 10:03

## 2021-03-02 RX ADMIN — SODIUM CHLORIDE, SODIUM LACTATE, POTASSIUM CHLORIDE, AND CALCIUM CHLORIDE 75 ML/HR: .6; .31; .03; .02 INJECTION, SOLUTION INTRAVENOUS at 15:47

## 2021-03-02 RX ADMIN — ONDANSETRON 4 MG: 2 INJECTION INTRAMUSCULAR; INTRAVENOUS at 12:16

## 2021-03-02 RX ADMIN — MIDAZOLAM 2 MG: 1 INJECTION INTRAMUSCULAR; INTRAVENOUS at 10:06

## 2021-03-02 RX ADMIN — MIDAZOLAM 2 MG: 1 INJECTION INTRAMUSCULAR; INTRAVENOUS at 10:00

## 2021-03-02 NOTE — INTERVAL H&P NOTE
H&P reviewed  After examining the patient I find no changes in the patients condition since the H&P had been written      Vitals:    03/02/21 0816   BP: (!) 173/72   Pulse: 86   Resp: 16   Temp: 98 7 °F (37 1 °C)   SpO2: 98%

## 2021-03-02 NOTE — PLAN OF CARE
Problem: Potential for Falls  Goal: Patient will remain free of falls  Description: INTERVENTIONS:  - Assess patient frequently for physical needs  -  Identify cognitive and physical deficits and behaviors that affect risk of falls    -  [place] fall precautions as indicated by assessment   - Educate patient/family on patient safety including physical limitations  - Instruct patient to call for assistance with activity based on assessment  - Modify environment to reduce risk of injury  - Consider OT/PT consult to assist with strengthening/mobility  Outcome: Progressing     Problem: GASTROINTESTINAL - ADULT  Goal: Minimal or absence of nausea and/or vomiting  Description: INTERVENTIONS:  - Administer IV fluids if ordered to ensure adequate hydration  - Maintain NPO status until nausea and vomiting are resolved  - Nasogastric tube if ordered  - Administer ordered antiemetic medications as needed  - Provide nonpharmacologic comfort measures as appropriate  - Advance diet as tolerated, if ordered  - Consider nutrition services referral to assist patient with adequate nutrition and appropriate food choices  Outcome: Progressing     Problem: PAIN - ADULT  Goal: Verbalizes/displays adequate comfort level or baseline comfort level  Description: Interventions:  - Encourage patient to monitor pain and request assistance  - Assess pain using appropriate pain scale  - Administer analgesics based on type and severity of pain and evaluate response  - Implement non-pharmacological measures as appropriate and evaluate response  - Consider cultural and social influences on pain and pain management  - Notify physician/advanced practitioner if interventions unsuccessful or patient reports new pain  Outcome: Progressing     Problem: INFECTION - ADULT  Goal: Absence or prevention of progression during hospitalization  Description: INTERVENTIONS:  - Assess and monitor for signs and symptoms of infection  - Monitor lab/diagnostic results  - Monitor all insertion sites, i e  indwelling lines, tubes, and drains  - Monitor endotracheal if appropriate and nasal secretions for changes in amount and color  - [place] appropriate cooling/warming therapies per order  - Administer medications as ordered  - Instruct and encourage patient and family to use good hand hygiene technique  - Identify and instruct in appropriate isolation precautions for identified infection/condition  Outcome: Progressing  Goal: Absence of fever/infection during neutropenic period  Description: INTERVENTIONS:  - Monitor WBC    Outcome: Progressing     Problem: SAFETY ADULT  Goal: Patient will remain free of falls  Description: INTERVENTIONS:  - Assess patient frequently for physical needs  -  Identify cognitive and physical deficits and behaviors that affect risk of falls    -  [place] fall precautions as indicated by assessment   - Educate patient/family on patient safety including physical limitations  - Instruct patient to call for assistance with activity based on assessment  - Modify environment to reduce risk of injury  - Consider OT/PT consult to assist with strengthening/mobility  Outcome: Progressing  Goal: Maintain or return to baseline ADL function  Description: INTERVENTIONS:  -  Assess patient's ability to carry out ADLs; assess patient's baseline for ADL function and identify physical deficits which impact ability to perform ADLs (bathing, care of mouth/teeth, toileting, grooming, dressing, etc )  - Assess/evaluate cause of self-care deficits   - Assess range of motion  - Assess patient's mobility; develop plan if impaired  - Assess patient's need for assistive devices and provide as appropriate  - Encourage maximum independence but intervene and supervise when necessary  - Involve family in performance of ADLs  - Assess for home care needs following discharge   - Consider OT consult to assist with ADL evaluation and planning for discharge  - Provide patient education as appropriate  Outcome: Progressing  Goal: Maintain or return mobility status to optimal level  Description: INTERVENTIONS:  - Assess patient's baseline mobility status (ambulation, transfers, stairs, etc )    - Identify cognitive and physical deficits and behaviors that affect mobility  - Identify mobility aids required to assist with transfers and/or ambulation (gait belt, sit-to-stand, lift, walker, cane, etc )  - [place] fall precautions as indicated by assessment  - Record patient progress and toleration of activity level on Mobility SBAR; progress patient to next Phase/Stage  - Instruct patient to call for assistance with activity based on assessment  - Consider rehabilitation consult to assist with strengthening/weightbearing, etc   Outcome: Progressing     Problem: DISCHARGE PLANNING  Goal: Discharge to home or other facility with appropriate resources  Description: INTERVENTIONS:  - Identify barriers to discharge w/patient and caregiver  - Arrange for needed discharge resources and transportation as appropriate  - Identify discharge learning needs (meds, wound care, etc )  - Arrange for interpretive services to assist at discharge as needed  - Refer to Case Management Department for coordinating discharge planning if the patient needs post-hospital services based on physician/advanced practitioner order or complex needs related to functional status, cognitive ability, or social support system  Outcome: Progressing     Problem: Knowledge Deficit  Goal: Patient/family/caregiver demonstrates understanding of disease process, treatment plan, medications, and discharge instructions  Description: Complete learning assessment and assess knowledge base    Interventions:  - Provide teaching at level of understanding  - Provide teaching via preferred learning methods  Outcome: Progressing

## 2021-03-02 NOTE — PLAN OF CARE
Problem: Potential for Falls  Goal: Patient will remain free of falls  Description: INTERVENTIONS:  - Assess patient frequently for physical needs  -  Identify cognitive and physical deficits and behaviors that affect risk of falls    -  [place] fall precautions as indicated by assessment   - Educate patient/family on patient safety including physical limitations  - Instruct patient to call for assistance with activity based on assessment  - Modify environment to reduce risk of injury  - Consider OT/PT consult to assist with strengthening/mobility  3/2/2021 1446 by Randi Rios RN  Outcome: Progressing  3/2/2021 1446 by Randi Rios RN  Outcome: Progressing     Problem: GASTROINTESTINAL - ADULT  Goal: Minimal or absence of nausea and/or vomiting  Description: INTERVENTIONS:  - Administer IV fluids if ordered to ensure adequate hydration  - Maintain NPO status until nausea and vomiting are resolved  - Nasogastric tube if ordered  - Administer ordered antiemetic medications as needed  - Provide nonpharmacologic comfort measures as appropriate  - Advance diet as tolerated, if ordered  - Consider nutrition services referral to assist patient with adequate nutrition and appropriate food choices  3/2/2021 1446 by Randi Rios RN  Outcome: Progressing  3/2/2021 1446 by Randi Rios RN  Outcome: Progressing     Problem: PAIN - ADULT  Goal: Verbalizes/displays adequate comfort level or baseline comfort level  Description: Interventions:  - Encourage patient to monitor pain and request assistance  - Assess pain using appropriate pain scale  - Administer analgesics based on type and severity of pain and evaluate response  - Implement non-pharmacological measures as appropriate and evaluate response  - Consider cultural and social influences on pain and pain management  - Notify physician/advanced practitioner if interventions unsuccessful or patient reports new pain  3/2/2021 1446 by Randi Rios RN  Outcome: Progressing  3/2/2021 1446 by Maximo Chapman RN  Outcome: Progressing     Problem: INFECTION - ADULT  Goal: Absence or prevention of progression during hospitalization  Description: INTERVENTIONS:  - Assess and monitor for signs and symptoms of infection  - Monitor lab/diagnostic results  - Monitor all insertion sites, i e  indwelling lines, tubes, and drains  - Monitor endotracheal if appropriate and nasal secretions for changes in amount and color  - [place] appropriate cooling/warming therapies per order  - Administer medications as ordered  - Instruct and encourage patient and family to use good hand hygiene technique  - Identify and instruct in appropriate isolation precautions for identified infection/condition  3/2/2021 1446 by Maximo Chapman RN  Outcome: Progressing  3/2/2021 1446 by Maximo Chapman RN  Outcome: Progressing  Goal: Absence of fever/infection during neutropenic period  Description: INTERVENTIONS:  - Monitor WBC    3/2/2021 1446 by Maximo Chapman RN  Outcome: Progressing  3/2/2021 1446 by Maximo Chapman RN  Outcome: Progressing     Problem: SAFETY ADULT  Goal: Patient will remain free of falls  Description: INTERVENTIONS:  - Assess patient frequently for physical needs  -  Identify cognitive and physical deficits and behaviors that affect risk of falls    -  [place] fall precautions as indicated by assessment   - Educate patient/family on patient safety including physical limitations  - Instruct patient to call for assistance with activity based on assessment  - Modify environment to reduce risk of injury  - Consider OT/PT consult to assist with strengthening/mobility  3/2/2021 1446 by Maximo Chapman RN  Outcome: Progressing  3/2/2021 1446 by Maximo Chapman RN  Outcome: Progressing  Goal: Maintain or return to baseline ADL function  Description: INTERVENTIONS:  -  Assess patient's ability to carry out ADLs; assess patient's baseline for ADL function and identify physical deficits which impact ability to perform ADLs (bathing, care of mouth/teeth, toileting, grooming, dressing, etc )  - Assess/evaluate cause of self-care deficits   - Assess range of motion  - Assess patient's mobility; develop plan if impaired  - Assess patient's need for assistive devices and provide as appropriate  - Encourage maximum independence but intervene and supervise when necessary  - Involve family in performance of ADLs  - Assess for home care needs following discharge   - Consider OT consult to assist with ADL evaluation and planning for discharge  - Provide patient education as appropriate  3/2/2021 1446 by Coretta Shaver RN  Outcome: Progressing  3/2/2021 1446 by Coretta Shaver RN  Outcome: Progressing  Goal: Maintain or return mobility status to optimal level  Description: INTERVENTIONS:  - Assess patient's baseline mobility status (ambulation, transfers, stairs, etc )    - Identify cognitive and physical deficits and behaviors that affect mobility  - Identify mobility aids required to assist with transfers and/or ambulation (gait belt, sit-to-stand, lift, walker, cane, etc )  - [place] fall precautions as indicated by assessment  - Record patient progress and toleration of activity level on Mobility SBAR; progress patient to next Phase/Stage  - Instruct patient to call for assistance with activity based on assessment  - Consider rehabilitation consult to assist with strengthening/weightbearing, etc   3/2/2021 1446 by Coretta Shaver RN  Outcome: Progressing  3/2/2021 1446 by Coretta Shaver RN  Outcome: Progressing     Problem: DISCHARGE PLANNING  Goal: Discharge to home or other facility with appropriate resources  Description: INTERVENTIONS:  - Identify barriers to discharge w/patient and caregiver  - Arrange for needed discharge resources and transportation as appropriate  - Identify discharge learning needs (meds, wound care, etc )  - Arrange for interpretive services to assist at discharge as needed  - Refer to Case Management Department for coordinating discharge planning if the patient needs post-hospital services based on physician/advanced practitioner order or complex needs related to functional status, cognitive ability, or social support system  3/2/2021 1446 by Inga Aguayo RN  Outcome: Progressing  3/2/2021 1446 by Inga Aguayo RN  Outcome: Progressing     Problem: Knowledge Deficit  Goal: Patient/family/caregiver demonstrates understanding of disease process, treatment plan, medications, and discharge instructions  Description: Complete learning assessment and assess knowledge base    Interventions:  - Provide teaching at level of understanding  - Provide teaching via preferred learning methods  3/2/2021 1446 by Inga Aguayo RN  Outcome: Progressing  3/2/2021 1446 by Inga Aguayo RN  Outcome: Progressing

## 2021-03-02 NOTE — OP NOTE
OPERATIVE REPORT  PATIENT NAME: Ya Torres    :  1980  MRN: [id number]  Pt Location: [facility] OR ROOM 07    SURGERY DATE: 3/2/2021    Surgeon(s) and Role:     * Iman Lora MD - Primary     * Sidra Linda MD - Fellow    Preop Diagnosis:  Morbid obesity ([facility] [address] ) [E66 01]    Post-Op Diagnosis Codes:     * Morbid obesity ([facility] [address] ) [E66 01]    Procedure(s) (LRB):  ROBOTIC SLEEVE GASTRECTOMY; ROBOTIC PARAESOPHAGEAL HERNIA REPAIR (N/A)    Specimen(s):  ID Type Source Tests Collected by Time Destination   1 : PORTION OF STOMACH Tissue Stomach TISSUE EXAM Iman Lora MD 3/2/2021 1145        Estimated Blood Loss:   20 ml    Drains:  * No LDAs found *    Anesthesia Type:   General    Operative Indications: [facility] ([facility] ) [E66 01]      Operative Findings:  paraesophageal hernia    Complications:   None    Procedure and Technique:  Robotic paraesophageal hernia repair without mesh  Robotic sleeve gastrectomy   I was present for the entire procedure    Patient Disposition:  hemodynamically stable     No qualified resident was available  Assistant was necessary for instrument exchange and counter traction and assistance with stapling      Indication:   Patient suffers from morbid obesity and associated co-morbidities  and failed to achieve any meaningful or sustainable weight loss and was therefore consented to undergo a laparoscopic sleeve gastrectomy  Risks and benefits were explained to the patient, patient consented for the procedure  Description of the procedure: The patient was brought to the operating room and placed in a supine position  The patient received a dose of IV antibiotics and a dose of subcutaneous Lovenox prior to the procedure  The patient was induced under general endotracheal anesthesia  The abdominal wall was prepped and draped under sterile conditions in the usual fashion   The procedure was started by obtaining access to the abdominal cavity using a Veress needle to the left side of the midline around 6 inches from the xiphoid the abdominal cavity was insufflated with CO2 to a pressure of 15 mmHg  After that, the abdomen was entered with an 8 mm trocar using an Optiview trocar under direct visualization  At that point, an 8-mm trocar and a 12-mm trocar were placed on the right side of the abdominal wall, under direct visualization, and another 8-mm trocar and 12 mm trocar were placed on the left side of the abdominal wall, also under direct visualization  A TAP block was performed using 20 ml of Exparel mixed with saline and 0 5 % Marcaine for a total of 100 ml  The patient was then placed in a reverse Trendelenburg position  A Jovan retractor was placed through a small stab incision below the xiphoid and was used to retract the left lobe of the liver in a medial fashion  The robot was then docked and the arms locked in place  An OG tube was placed to decompress the stomach and then removed immediately  The angle of His was then dissected using the vessel sealer  The phrenoesophageal ligament was also dissected anteriorly and  a paraesophageal hernia was identified  The decision was then made to repair the hernia posteriorly  Right brandie and left brandie were dissected away from the hernia sac and skeletonized all the way down to the confluence  Esophagus was kept out of harm's way during the dissection  The dissection was completed circumferentially around the esophagus  Right brandie and left brandie were then approximated using 0 Ethibond sutures placed in an interrupted fashion  After approximating the crura the anesthesiologist placed a 58 [place] bougie to size the hiatus the bougie was then removed  At that point, we turned our attention to the pylorus and using the vessel sealer, the gastrocolic ligament was taken down starting 4 cm proximal to the pylorus, all the way up to the level of the short gastric vessels which were taken down with the vessel sealer as well   The angle of His was also dissected and all the posterior attachments of the fundus were taken down with the vessel sealerand with sharp dissection, the spleen was kept out of harms way and the left brandie was exposed and the stomach was completely mobilized off the left brandie and rotated medially  There were some posterior attachments to the posterior wall of the stomach and those were taken down sharply with laparoscopic scissors  At that point, the anesthesiologist was asked to insert a 36-Citizen of [place] Bougie  The Bougie was secured in place by the anesthesiologist      We started transecting the stomach using a green cartridge  The first 2 firings consisted of green cartridge loads  and the rest of the firings consisted of blue loads  The 36-[place]izen [place] Bougie was kept in place throughout the performance of the sleeve gastrectomy  We made particular attention during the transaction of the greater curvature to retract the stomach laterally at the site of the transected vessels  to prevent corkscrewing of the gastric sleeve  We also avoided getting too close to the incisura to prevent  narrowing at the level of the incisura  The staple line was hemostatic and well formed and there was no evidence of narrowing at the incisura  The staple line was then imbricated using 2-0 V LOC suture in a running fashion while the bougie is in place  At the end, the anesthesiologist was asked to remove the [address]  The surgical field was inspected one more time and appeared hemostatic  We then removed the Elizabeth Garcia liver retractor  The midline camera port was extended, and then it was dilated  After that, the stomach specimen was retrieved and sent to Pathology  Sponge count was completed and was correct  The 12 mm port was then closed using #1 Vicryl in a simple fashion  All the trocars were removed under direct visualization, and the skin edges were approximated using 4-0 Monocryl in an interrupted, inverted fashion   Histoacryl was then applied to the skin incisions  The patient was extubated and transferred to the PACU in stable condition      SIGNATURE: Shana Moreno MD  DATE: March 2, 2021  TIME: 11:48 AM

## 2021-03-02 NOTE — ANESTHESIA PREPROCEDURE EVALUATION
Procedure:  ROBOTIC SLEEVE GASTRECTOMY, INTRAOP EGD  (N/A Abdomen)    Relevant Problems   GI/HEPATIC   (+) GERD (gastroesophageal reflux disease)      MUSCULOSKELETAL   (+) Osteoarthritis of hip      PULMONARY   (+) Asthma        Physical Exam    Airway    Mallampati score: II  TM Distance: <3 FB  Neck ROM: full     Dental       Cardiovascular  Rhythm: regular, Rate: normal,     Pulmonary  Breath sounds clear to auscultation,     Other Findings        Anesthesia Plan  ASA Score- 2     Anesthesia Type- general with ASA Monitors  Additional Monitors:   Airway Plan:           Plan Factors-Exercise tolerance (METS): >4 METS  Chart reviewed  Existing labs reviewed  Patient summary reviewed  Patient is not a current smoker  Patient not instructed to abstain from smoking on day of procedure  Patient did not smoke on day of surgery  Induction- intravenous  Postoperative Plan-     Informed Consent- Anesthetic plan and risks discussed with patient

## 2021-03-03 VITALS
TEMPERATURE: 98.5 F | SYSTOLIC BLOOD PRESSURE: 150 MMHG | HEART RATE: 63 BPM | OXYGEN SATURATION: 96 % | RESPIRATION RATE: 18 BRPM | DIASTOLIC BLOOD PRESSURE: 72 MMHG | HEIGHT: 67 IN | BODY MASS INDEX: 39.65 KG/M2 | WEIGHT: 252.65 LBS

## 2021-03-03 LAB
ANION GAP SERPL CALCULATED.3IONS-SCNC: 9 MMOL/L (ref 4–13)
BUN SERPL-MCNC: 12 MG/DL (ref 5–25)
CALCIUM SERPL-MCNC: 8.8 MG/DL (ref 8.3–10.1)
CHLORIDE SERPL-SCNC: 106 MMOL/L (ref 100–108)
CO2 SERPL-SCNC: 24 MMOL/L (ref 21–32)
CREAT SERPL-MCNC: 1.05 MG/DL (ref 0.6–1.3)
ERYTHROCYTE [DISTWIDTH] IN BLOOD BY AUTOMATED COUNT: 14.4 % (ref 11.6–15.1)
GFR SERPL CREATININE-BSD FRML MDRD: 66 ML/MIN/1.73SQ M
GLUCOSE SERPL-MCNC: 152 MG/DL (ref 65–140)
HCT VFR BLD AUTO: 38.2 % (ref 34.8–46.1)
HGB BLD-MCNC: 12.2 G/DL (ref 11.5–15.4)
MCH RBC QN AUTO: 27.2 PG (ref 26.8–34.3)
MCHC RBC AUTO-ENTMCNC: 31.9 G/DL (ref 31.4–37.4)
MCV RBC AUTO: 85 FL (ref 82–98)
PLATELET # BLD AUTO: 366 THOUSANDS/UL (ref 149–390)
PMV BLD AUTO: 10.2 FL (ref 8.9–12.7)
POTASSIUM SERPL-SCNC: 4.4 MMOL/L (ref 3.5–5.3)
RBC # BLD AUTO: 4.49 MILLION/UL (ref 3.81–5.12)
SODIUM SERPL-SCNC: 139 MMOL/L (ref 136–145)
WBC # BLD AUTO: 12.83 THOUSAND/UL (ref 4.31–10.16)

## 2021-03-03 PROCEDURE — 0BQT4ZZ REPAIR DIAPHRAGM, PERCUTANEOUS ENDOSCOPIC APPROACH: ICD-10-PCS | Performed by: SURGERY

## 2021-03-03 PROCEDURE — NC001 PR NO CHARGE: Performed by: SURGERY

## 2021-03-03 PROCEDURE — 80048 BASIC METABOLIC PNL TOTAL CA: CPT | Performed by: STUDENT IN AN ORGANIZED HEALTH CARE EDUCATION/TRAINING PROGRAM

## 2021-03-03 PROCEDURE — 85027 COMPLETE CBC AUTOMATED: CPT | Performed by: STUDENT IN AN ORGANIZED HEALTH CARE EDUCATION/TRAINING PROGRAM

## 2021-03-03 PROCEDURE — 99024 POSTOP FOLLOW-UP VISIT: CPT | Performed by: SURGERY

## 2021-03-03 PROCEDURE — 8E0W4CZ ROBOTIC ASSISTED PROCEDURE OF TRUNK REGION, PERCUTANEOUS ENDOSCOPIC APPROACH: ICD-10-PCS | Performed by: SURGERY

## 2021-03-03 PROCEDURE — 0DB64Z3 EXCISION OF STOMACH, PERCUTANEOUS ENDOSCOPIC APPROACH, VERTICAL: ICD-10-PCS | Performed by: SURGERY

## 2021-03-03 RX ORDER — ACETAMINOPHEN 160 MG/5ML
975 SUSPENSION, ORAL (FINAL DOSE FORM) ORAL EVERY 8 HOURS
Qty: 638.4 ML | Refills: 0 | Status: SHIPPED | OUTPATIENT
Start: 2021-03-03 | End: 2021-03-10

## 2021-03-03 RX ORDER — HYDRALAZINE HYDROCHLORIDE 20 MG/ML
10 INJECTION INTRAMUSCULAR; INTRAVENOUS ONCE
Status: COMPLETED | OUTPATIENT
Start: 2021-03-03 | End: 2021-03-03

## 2021-03-03 RX ADMIN — HYDRALAZINE HYDROCHLORIDE 10 MG: 20 INJECTION, SOLUTION INTRAMUSCULAR; INTRAVENOUS at 08:08

## 2021-03-03 RX ADMIN — FAMOTIDINE 20 MG: 10 INJECTION INTRAVENOUS at 08:07

## 2021-03-03 RX ADMIN — SODIUM CHLORIDE, SODIUM LACTATE, POTASSIUM CHLORIDE, AND CALCIUM CHLORIDE 75 ML/HR: .6; .31; .03; .02 INJECTION, SOLUTION INTRAVENOUS at 10:35

## 2021-03-03 RX ADMIN — ENOXAPARIN SODIUM 40 MG: 40 INJECTION SUBCUTANEOUS at 08:08

## 2021-03-03 RX ADMIN — ACETAMINOPHEN 975 MG: 325 SUSPENSION ORAL at 05:25

## 2021-03-03 RX ADMIN — ACETAMINOPHEN 975 MG: 325 SUSPENSION ORAL at 14:00

## 2021-03-03 RX ADMIN — OXYCODONE HYDROCHLORIDE 5 MG: 5 SOLUTION ORAL at 10:38

## 2021-03-03 NOTE — PROGRESS NOTES
Progress Note - Bariatric Surgery   Adriana Bennett 38 y o  female MRN: [id number]  Unit/Bed#: E5 -01 Encounter: [id number]      Subjective/Objective     Subjective:  Patient with morbid obesity POD1 s/p Robotic Sleeve Gastrectomy   Patient denies fevers, chills, sweats, SOB, CP, calf pain  Pain adequately controlled on oral pain medication  Ambulating without assistance, voiding well, and using incentive spirometer  Tolerating liquid diet without nausea or vomiting today  Vital signs stable  CBC today shows Hgb 12 2 from 13 4 previously  BMP obtained today shows Glucose high 152  No hx CPAP  Objective:    /78 (BP Location: Right arm)   Pulse 60   Temp 97 8 °F (36 6 °C) (Temporal)   Resp 18   Ht 5' 7" (1 702 m)   Wt 115 kg (252 lb 10 4 oz)   LMP 01/17/2021 Comment: NUVA ring  SpO2 98%   BMI 39 57 kg/m²       Intake/Output Summary (Last 24 hours) at 3/3/2021 0840  Last data filed at 3/3/2021 0503  Gross per 24 hour   Intake 4023 75 ml   Output 1575 ml   Net 2448  75 ml       Invasive Devices     Peripheral Intravenous Line            Peripheral IV 03/02/21 Left Hand less than 1 day                ROS: 10-point system completed  All negative except see HPI      Physical Exam    General Appearance:    Alert, cooperative, no distress, appears stated age   Head:    Normocephalic, without obvious abnormality, atraumatic   Lungs:     Respirations unlabored   Heart:    Regular rate and rhythm   Abdomen:     Soft, appropriate tenderness, no masses, no organomegaly, non-distended   Extremities:   Extremities normal, atraumatic, no cyanosis or edema   Neurologic:  Incision:  Psych:   Normal strength and sensation    Clean, dry, and intact, no bleeding    Normal mood and affect       Lab, Imaging and other studies:  CBC:   Lab Results   Component Value Date    WBC 12 83 (H) 03/03/2021    HGB 12 2 03/03/2021    HCT 38 2 03/03/2021    MCV 85 03/03/2021     03/03/2021    MCH 27 2 03/03/2021 MCHC 31 9 03/03/2021    RDW 14 4 03/03/2021    MPV 10 2 03/03/2021   , CMP:   Lab Results   Component Value Date    SODIUM 139 03/03/2021    K 4 4 03/03/2021     03/03/2021    CO2 24 03/03/2021    BUN 12 03/03/2021    CREATININE 1 05 03/03/2021    CALCIUM 8 8 03/03/2021    EGFR 66 03/03/2021        VTE Mechanical Prophylaxis: sequential compression device    Assessment/Plan  1)  Patient with morbid Obesity s/p Robotic Sleeve Gastrectomy with stable post op course  Patient afebrile and hemodynamically stable  - Encourage PO fluids  - Recommend ambulation, use of SCDs when not ambulating, and incentive spirometry    - Ok for nurse to give Lovenox   - No need to repeat labs  - Plan to D/C patient home today pending anticipated progression    Plan of care was discussed with patient  Care plan discussed with Dr Yolanda Peñaloza, PA-C  Bariatric Surgery  3/3/2021  8:40 AM

## 2021-03-03 NOTE — DISCHARGE INSTRUCTIONS
Bariatric/Weight Loss Surgery  Hospital Discharge Instructions  1  ACTIVITY:  a  Progress as feels comfortable - a good rule is:  if you are doing something and it begins to hurt, stop doing the activity  Walk every hour while at home  b  Dilia Ibarra may walk stairs if you do so slowly  c  You may shower 48 hours after surgery  d  Use your incentive spirometer 10 times per hour while awake for 1 week  e  Do NOT drive for 48 hours after surgery  No driving 24 hours after taking certain prescription pain medications   Examples of such medication are Percocet, Darvocet, Oxycodone, Tylenol #3, and Tylenol with Codeine  2  DIET  a  Stay on a liquid diet for 7 days after your surgery date, sipping slowly  Refer to your manual for examples of choices  Remember to keep your liquids sugar free or low calorie  You may have protein drinks  Make sure to drink 48 to 64 ounces per day of fluids  b  Dilia Ibarra may advance to a pureed diet one week after surgery as instructed by your diet progression pamphlet  Once you get approval from your surgeon at your first post operative visit you may advance to the soft diet  3  MEDICATIONS:  a  The abdominal nerve block will wear off during the first 1-2 days that you are home, and you may become sore  Continue to take your Tylenol and your pain medication as instructed  Finish full 13 day course of Lovenox injections  b  Start vitamins and minerals when you get home  c  Anti-acid Medication as per prescription  d  Other medications as indicated on the Physician Patient Discharge Instructions form given to you at the time of discharge  e  Make sure that you are splitting your pill or tablet medications in halves or fourths or even crushing them before you take them  Capsules should be opened and mixed with water or jello  You need to do this for at least 4 weeks after surgery  Eventually you will be able to take your medications the regular way as they were prescribed     f  Dilia 23 will need to consult with your Family Doctor in regards to all your prescribed medication, particularly those for blood pressure and diabetes  As you lose weight, medical conditions may change, requiring an alteration or elimination of the drug dose  g  DO NOT TAKE BIRTH CONTROL(BC) MEDICATIONS, INSERT BC VAGINAL RINGS, OR PLACE IUD OR ANY OTHER BC METHODS UNTIL 31 DAYS FROM DAY OF DISCHARGE FROM HOSPITAL  THIS PLACES YOU AT HIGH RISK FOR A POTENTIALLY LIFE THREATENING BLOOD CLOT  Remember to always use barrier methods for birth control and speak to your GYN about using two forms of birth control to start 31 days after surgery  It is very important to avoid pregnancy until at least 18-24 months after surgery  4  INCISION CARE  a  You may shower and get incisions wet 2 days after surgery  No soaking tub baths or swimming for 30 days after surgery  Keep abdominal area and incisions clean  Use soap and water to create a good lather and rinse off  Do not scrub incisions  b  If you have a drain, empty the drain as the nurses instructed  5  FOLLOW-UP APPOINTMENT should be made for one week after discharge  Call surgeons office at [phone] to schedule an appointment      6  CALL YOUR DOCTOR FOR:  pain not controlled by pain medications, a temperature greater than 101 5° F, any increase or change in drainage or redness from any incision, any vomiting or inability to keep liquids down, shortness of breath, shoulder pain, or bleeding

## 2021-03-03 NOTE — DISCHARGE INSTR - AVS FIRST PAGE
your pain medications from [facility] in [place] [address]   Take Tylenol as instructed  Finish full 13 day course of Lovenox injections  Stay hydrated and follow your discharge diet progression   Mild nausea is ok as long as you can drink fluids  Take your omeprazole daily  Crush or cut your pills and open capsules, mix with liquid to drink    Follow up with Dr Corin Abad and your PCP within the next week

## 2021-03-03 NOTE — DISCHARGE SUMMARY
Discharge Summary - Grisel Rosas 38 y o  female MRN: [id number]    Unit/Bed#: E5 -01 Encounter: [id number]      Pre-Operative Diagnosis: Pre-Op Diagnosis Codes:     * Morbid obesity ([facility] [address] ) [E66 01]    Post-Operative Diagnosis: Post-Op Diagnosis Codes:     * Morbid obesity ([facility] [address] ) [E66 01]    Procedures Performed:  Procedure(s):  ROBOTIC SLEEVE GASTRECTOMY; ROBOTIC PARAESOPHAGEAL HERNIA REPAIR    Surgeon: Antoine Mckee MD    See H & P for full details of admission and Operative Note for full details of operations performed  Patient tolerated surgery well without complications  In the morning postoperative Day 1, the patient had mild nausea and abdominal pain  Tolerated a clear liquid diet without vomiting  Able to ambulate and voiding independently  Patient was deemed ready for discharge home  Patient was seen and examined prior to discharge  Provisions for Follow-Up Care:  See After Visit Summary/Discharge Instructions for information related to follow-up care and home orders  Disposition: Home, in stable condition  Planned Readmission: No    Discharge Medications:  See After Visit Summary/Discharge Instructions for reconciled discharge medications provided to patient and family  Post Operative instructions: Reviewed with patient and/or family      Signature:   Prema Cheung PA-C  Date: 3/3/2021 Time: 10:50 AM

## 2021-03-03 NOTE — UTILIZATION REVIEW
Initial Clinical Review    Elective     IP     surgical procedure    Age/Sex: 38 y o  female     Surgery Date:     3/2/21    Procedure: ROBOTIC SLEEVE GASTRECTOMY; ROBOTIC PARAESOPHAGEAL HERNIA REPAIR     Anesthesia:     general    Operative Findings: paraesophageal hernia    POD#1 Progress Note:   3/3    Continue post op care  Continue to  Monitor labs, hemoglobin   12 2, was  13 4  Encourage ambulation/incentive spirometry  Tolerating cl liq diet  Continue pain control/anti emetics   As needed         Admission Orders: Date/Time/Statement:   Admission Orders (From admission, onward)     Ordered        03/02/21 1207  INPATIENT ADMISSION  Once                   Orders Placed This Encounter   Procedures    INPATIENT ADMISSION     Standing Status:   Standing     Number of Occurrences:   1     Order Specific Question:   Level of Care     Answer:   Med Surg [16]     Order Specific Question:   Estimated length of stay     Answer:   Inpatient Only Surgery     Vital Signs: /78 (BP Location: Right arm)   Pulse 60   Temp 97 8 °F (36 6 °C) (Temporal)   Resp 18   Ht 5' 7" (1 702 m)   Wt 115 kg (252 lb 10 4 oz)   LMP 01/17/2021 Comment: NUVA ring  SpO2 98%   BMI 39 57 kg/m²      Diet:    Bariatric cl liq    Mobility:    OOB  As tolerated    DVT Prophylaxis:    SCD'S    Medications/Pain Control:   Scheduled Medications:  acetaminophen, 975 mg, Oral, Q8H  enoxaparin, 40 mg, Subcutaneous, Daily  famotidine, 20 mg, Intravenous, Q12H RONNA  scopolamine, 1 patch, Transdermal, Once      Continuous IV Infusions:  lactated ringers, 75 mL/hr, Intravenous, Continuous      PRN Meds:  metoclopramide, 10 mg, Intravenous, Q6H PRN  morphine injection, 4 mg, Intravenous, Q4H PRN  ondansetron, 4 mg, Intravenous, Q6H PRN  oxyCODONE, 10 mg, Oral, Q4H PRN  oxyCODONE, 5 mg, Oral, Q4H PRN  promethazine, 25 mg, Intramuscular, Q6H PRN        Network Utilization Review Department  ATTENTION: Please call with any questions or concerns to [phone] and carefully listen to the prompts so that you are directed to the right person  All voicemails are confidential   Evonne Cuellar all requests for admission clinical reviews, approved or denied determinations and any other requests to dedicated fax number below belonging to the campus where the patient is receiving treatment   List of dedicated fax numbers for the Facilities:  [address] DENIALS (Administrative/Medical Necessity) [phone]   [address] (Maternity/NICU/Pediatrics) [phone]   [address] Lucero Lincoln 7934 (  Abiel Galan "Sandra" 103) [address] [place] [address] [address] Shameka Hassan [address] [place] [address] [phone]

## 2021-03-03 NOTE — PLAN OF CARE
Problem: Potential for Falls  Goal: Patient will remain free of falls  Description: INTERVENTIONS:  - Assess patient frequently for physical needs  -  Identify cognitive and physical deficits and behaviors that affect risk of falls    -  [place] fall precautions as indicated by assessment   - Educate patient/family on patient safety including physical limitations  - Instruct patient to call for assistance with activity based on assessment  - Modify environment to reduce risk of injury  - Consider OT/PT consult to assist with strengthening/mobility  Outcome: Progressing     Problem: GASTROINTESTINAL - ADULT  Goal: Minimal or absence of nausea and/or vomiting  Description: INTERVENTIONS:  - Administer IV fluids if ordered to ensure adequate hydration  - Maintain NPO status until nausea and vomiting are resolved  - Nasogastric tube if ordered  - Administer ordered antiemetic medications as needed  - Provide nonpharmacologic comfort measures as appropriate  - Advance diet as tolerated, if ordered  - Consider nutrition services referral to assist patient with adequate nutrition and appropriate food choices  Outcome: Progressing  Goal: Maintains adequate nutritional intake  Description: INTERVENTIONS:  - Monitor percentage of each meal consumed  - Identify factors contributing to decreased intake, treat as appropriate  - Assist with meals as needed  - Monitor I&O, weight, and lab values if indicated  - Obtain nutrition services referral as needed  Outcome: Progressing     Problem: SKIN/TISSUE INTEGRITY - ADULT  Goal: Skin integrity remains intact  Description: INTERVENTIONS  - Identify patients at risk for skin breakdown  - Assess and monitor skin integrity  - Assess and monitor nutrition and hydration status  - Monitor labs (i e  albumin)  - Assess for incontinence   - Turn and reposition patient  - Assist with mobility/ambulation  - Relieve pressure over bony prominences  - Avoid friction and shearing  - Provide appropriate hygiene as needed including keeping skin clean and dry  - Evaluate need for skin moisturizer/barrier cream  - Collaborate with interdisciplinary team (i e  Nutrition, Rehabilitation, etc )   - Patient/family teaching  Outcome: Progressing  Goal: Incision(s), wounds(s) or drain site(s) healing without S/S of infection  Description: INTERVENTIONS  - Assess and document risk factors for skin impairment   - Assess and document dressing, incision, wound bed, drain sites and surrounding tissue  - Consider nutrition services referral as needed  - Oral mucous membranes remain intact  - Provide patient/ family education  Outcome: Progressing     Problem: PAIN - ADULT  Goal: Verbalizes/displays adequate comfort level or baseline comfort level  Description: Interventions:  - Encourage patient to monitor pain and request assistance  - Assess pain using appropriate pain scale  - Administer analgesics based on type and severity of pain and evaluate response  - Implement non-pharmacological measures as appropriate and evaluate response  - Consider cultural and social influences on pain and pain management  - Notify physician/advanced practitioner if interventions unsuccessful or patient reports new pain  Outcome: Progressing     Problem: INFECTION - ADULT  Goal: Absence or prevention of progression during hospitalization  Description: INTERVENTIONS:  - Assess and monitor for signs and symptoms of infection  - Monitor lab/diagnostic results  - Monitor all insertion sites, i e  indwelling lines, tubes, and drains  - Monitor endotracheal if appropriate and nasal secretions for changes in amount and color  - [place] appropriate cooling/warming therapies per order  - Administer medications as ordered  - Instruct and encourage patient and family to use good hand hygiene technique  - Identify and instruct in appropriate isolation precautions for identified infection/condition  Outcome: Progressing  Goal: Absence of fever/infection during neutropenic period  Description: INTERVENTIONS:  - Monitor WBC    Outcome: Progressing     Problem: SAFETY ADULT  Goal: Patient will remain free of falls  Description: INTERVENTIONS:  - Assess patient frequently for physical needs  -  Identify cognitive and physical deficits and behaviors that affect risk of falls    -  [place] fall precautions as indicated by assessment   - Educate patient/family on patient safety including physical limitations  - Instruct patient to call for assistance with activity based on assessment  - Modify environment to reduce risk of injury  - Consider OT/PT consult to assist with strengthening/mobility  Outcome: Progressing  Goal: Maintain or return to baseline ADL function  Description: INTERVENTIONS:  -  Assess patient's ability to carry out ADLs; assess patient's baseline for ADL function and identify physical deficits which impact ability to perform ADLs (bathing, care of mouth/teeth, toileting, grooming, dressing, etc )  - Assess/evaluate cause of self-care deficits   - Assess range of motion  - Assess patient's mobility; develop plan if impaired  - Assess patient's need for assistive devices and provide as appropriate  - Encourage maximum independence but intervene and supervise when necessary  - Involve family in performance of ADLs  - Assess for home care needs following discharge   - Consider OT consult to assist with ADL evaluation and planning for discharge  - Provide patient education as appropriate  Outcome: Progressing  Goal: Maintain or return mobility status to optimal level  Description: INTERVENTIONS:  - Assess patient's baseline mobility status (ambulation, transfers, stairs, etc )    - Identify cognitive and physical deficits and behaviors that affect mobility  - Identify mobility aids required to assist with transfers and/or ambulation (gait belt, sit-to-stand, lift, walker, cane, etc )  - [place] fall precautions as indicated by assessment  - Record patient progress and toleration of activity level on Mobility SBAR; progress patient to next Phase/Stage  - Instruct patient to call for assistance with activity based on assessment  - Consider rehabilitation consult to assist with strengthening/weightbearing, etc   Outcome: Progressing     Problem: DISCHARGE PLANNING  Goal: Discharge to home or other facility with appropriate resources  Description: INTERVENTIONS:  - Identify barriers to discharge w/patient and caregiver  - Arrange for needed discharge resources and transportation as appropriate  - Identify discharge learning needs (meds, wound care, etc )  - Arrange for interpretive services to assist at discharge as needed  - Refer to Case Management Department for coordinating discharge planning if the patient needs post-hospital services based on physician/advanced practitioner order or complex needs related to functional status, cognitive ability, or social support system  Outcome: Progressing     Problem: Knowledge Deficit  Goal: Patient/family/caregiver demonstrates understanding of disease process, treatment plan, medications, and discharge instructions  Description: Complete learning assessment and assess knowledge base    Interventions:  - Provide teaching at level of understanding  - Provide teaching via preferred learning methods  Outcome: Progressing

## 2021-03-03 NOTE — UTILIZATION REVIEW
Notification of Discharge  This is a Notification of Discharge from our facility [address]  Please be advised that this patient has been discharge from our facility  Below you will find the admission and discharge date and time including the patients disposition  PRESENTATION DATE: 3/2/2021  7:54 AM  OBS ADMISSION DATE:  IP ADMISSION DATE: 3/2/21 1207   DISCHARGE DATE: 3/3/2021  2:57 PM  DISPOSITION: Home/Self Care Home/Self Care   Admission Orders listed below:  Admission Orders (From admission, onward)     Ordered        03/02/21 1207  1 [facility],[address]  [facility]                   Please contact the UR Department if additional information is required to close this patient's authorization/case  Primo Mitra  [facility] Utilization Review Department  Main: [phone] x carefully listen to the prompts  All voicemails are confidential   [email or web address]  Send all requests for admission clinical reviews, approved or denied determinations and any other requests to dedicated fax number below belonging to the campus where the patient is receiving treatment   List of dedicated fax numbers:  [address] DENIALS (Administrative/Medical Necessity) [phone]   [address] (Maternity/NICU/Pediatrics) [phone]   Deneise Notice [phone]   Ramona Rose [phone]   Jaycob Jacques [phone]   Zayda Dumont [facility] [address] [place] [address] [phone]   [facility]  [phone]   2205 [facility]  2401 [facility] 1000 [address] [facility] [phone]

## 2021-03-04 ENCOUNTER — TELEPHONE (OUTPATIENT)
Dept: MEDSURG UNIT | Facility: HOSPITAL | Age: 41
End: 2021-03-04

## 2021-03-05 ENCOUNTER — TRANSITIONAL CARE MANAGEMENT (OUTPATIENT)
Dept: FAMILY MEDICINE CLINIC | Facility: CLINIC | Age: 41
End: 2021-03-05

## 2021-03-08 ENCOUNTER — OFFICE VISIT (OUTPATIENT)
Dept: FAMILY MEDICINE CLINIC | Facility: CLINIC | Age: 41
End: 2021-03-08
Payer: COMMERCIAL

## 2021-03-08 VITALS
OXYGEN SATURATION: 98 % | HEIGHT: 67 IN | TEMPERATURE: 97.2 F | HEART RATE: 101 BPM | DIASTOLIC BLOOD PRESSURE: 76 MMHG | WEIGHT: 244.8 LBS | SYSTOLIC BLOOD PRESSURE: 124 MMHG | BODY MASS INDEX: 38.42 KG/M2

## 2021-03-08 DIAGNOSIS — Z76.89 ENCOUNTER FOR SUPPORT AND COORDINATION OF TRANSITION OF CARE: Primary | ICD-10-CM

## 2021-03-08 DIAGNOSIS — Z98.84 STATUS POST LAPAROSCOPIC SLEEVE GASTRECTOMY: ICD-10-CM

## 2021-03-08 PROCEDURE — 99496 TRANSJ CARE MGMT HIGH F2F 7D: CPT | Performed by: FAMILY MEDICINE

## 2021-03-08 PROCEDURE — 1111F DSCHRG MED/CURRENT MED MERGE: CPT | Performed by: FAMILY MEDICINE

## 2021-03-11 ENCOUNTER — OFFICE VISIT (OUTPATIENT)
Dept: BARIATRICS | Facility: CLINIC | Age: 41
End: 2021-03-11

## 2021-03-11 VITALS
BODY MASS INDEX: 37.28 KG/M2 | SYSTOLIC BLOOD PRESSURE: 130 MMHG | WEIGHT: 237.5 LBS | TEMPERATURE: 97.6 F | HEART RATE: 84 BPM | DIASTOLIC BLOOD PRESSURE: 80 MMHG | HEIGHT: 67 IN

## 2021-03-11 DIAGNOSIS — Z98.84 BARIATRIC SURGERY STATUS: ICD-10-CM

## 2021-03-11 DIAGNOSIS — Z98.84 BARIATRIC SURGERY STATUS: Primary | ICD-10-CM

## 2021-03-11 DIAGNOSIS — E66.01 MORBID (SEVERE) OBESITY DUE TO EXCESS CALORIES (HCC): Primary | ICD-10-CM

## 2021-03-11 PROCEDURE — 99024 POSTOP FOLLOW-UP VISIT: CPT | Performed by: SURGERY

## 2021-03-11 PROCEDURE — RECHECK: Performed by: DIETITIAN, REGISTERED

## 2021-03-11 PROCEDURE — 3008F BODY MASS INDEX DOCD: CPT | Performed by: SURGERY

## 2021-03-11 NOTE — PROGRESS NOTES
POST OP UP VISIT - BARIATRIC SURGERY  Jovanna Schmitz 38 y o  female MRN: [id number]  Unit/Bed#:  Encounter: [id number]      HPI:  Jovanna Schmitz is a 38 y o  female status post ROBOTIC SLEEVE GASTRECTOMY; ROBOTIC PARAESOPHAGEAL HERNIA REPAIR 3/2/21  Doing well, tolerating diet  Taking PPI and Lovenox  C/O itching of her incisions  We reviewed and discussed pathology report      Review of Systems   Constitutional: Negative for chills and fatigue  HENT: Negative for ear pain  Eyes: Negative for pain  Respiratory: Negative for cough, shortness of breath and wheezing  Cardiovascular: Negative for chest pain  Gastrointestinal: Negative for abdominal distention and abdominal pain  Endocrine: Negative for polydipsia  Genitourinary: Negative for flank pain  Musculoskeletal: Negative for arthralgias and back pain  Skin: Negative for pallor  Itching  Rash around incisions, no infections   Allergic/Immunologic: Negative for food allergies  Neurological: Negative for weakness and headaches  Hematological: Does not bruise/bleed easily  Psychiatric/Behavioral: Negative for confusion         Historical Information   Past Medical History:   Diagnosis Date    Arthritis     Asthma     allergy induced    Environmental allergies     dust    GERD (gastroesophageal reflux disease)     Migraine with aura     Osteoarthritis     Seasonal allergies     Wears contact lenses     wears at night     Past Surgical History:   Procedure Laterality Date    ANKLE SURGERY Left     ANKLE STABALIZATION    CHOLECYSTECTOMY  2015    COLONOSCOPY      LA LAP, DOMINGO RESTRICT PROC, LONGITUDINAL GASTRECTOMY N/A 3/2/2021    Procedure: ROBOTIC SLEEVE GASTRECTOMY; ROBOTIC PARAESOPHAGEAL HERNIA REPAIR;  Surgeon: Peter Kaplan MD;  Location: [facility];  Service: [address] EXTRACTION      TOTAL HIP ARTHROPLASTY Left      Social History   Social History     Substance and Sexual Activity   Alcohol Use Yes    Frequency: Monthly or less    Drinks per session: 1 or 2    Binge frequency: Never    Comment: SOCIAL     Social History     Substance and Sexual Activity   Drug Use No     Social History     Tobacco Use   Smoking Status Never Smoker   Smokeless Tobacco Never Used     Family History: non-contributory    Meds/Allergies   all medications and allergies reviewed  No Known Allergies    Objective       Current Vitals:   Blood Pressure: 130/80 (03/11/21 0958)  Pulse: 84 (03/11/21 0958)  Temperature: 97 6 °F (36 4 °C) (03/11/21 0958)  Temp Source: Tympanic (03/11/21 0958)  Height: 5' 7" (170 2 cm) (03/11/21 0958)  Weight - Scale: 108 kg (237 lb 8 oz) (03/11/21 0958)      Invasive Devices     None                 Physical Exam  Constitutional:       Appearance: Normal appearance  HENT:      Head: Normocephalic  Mouth/Throat:      Mouth: Mucous membranes are moist    Eyes:      Conjunctiva/sclera: Conjunctivae normal       Pupils: Pupils are equal, round, and reactive to light  Neck:      Musculoskeletal: Normal range of motion  Cardiovascular:      Rate and Rhythm: Normal rate and regular rhythm  Pulmonary:      Effort: Pulmonary effort is normal    Abdominal:      General: Abdomen is flat  There is no distension  Tenderness: There is no abdominal tenderness  Comments: All laparoscopic incisions are healing well, with no signs of infection  There are no palpable incisions hernias  Deep palpation is not painful in all 4 quadrants, and there are no peritoneal findings  rash around incisions, no infections   Musculoskeletal: Normal range of motion  Skin:     General: Skin is warm  Capillary Refill: Capillary refill takes less than 2 seconds  Neurological:      General: No focal deficit present  Mental Status: She is alert  Psychiatric:         Mood and Affect: Mood normal              Pathology, and Other Studies: I have personally reviewed pertinent reports      A  Stomach (partial gastrectomy):  - Mild chronic inactive oxyntic gastritis  - No H pylori identified (H&E)  - No intestinal metaplasia     Assessment/PLAN:    38 y o  female status post ROBOTIC SLEEVE GASTRECTOMY; ROBOTIC PARAESOPHAGEAL HERNIA REPAIR 3/2/21  Doing well post op  No major issues  Increase physical activity as tolerated and as instructed  Advance diet as instructed by our dietitians today and as indicated in the binder  Continued taking multivitamins and PPI  Follow up in one month as scheduled  Take over the counter 1% hydrocortisone and if needed can take PO benadryl if needed                Nellie Manzanares MD  3/11/2021  10:15 AM

## 2021-03-11 NOTE — PROGRESS NOTES
Weight Management Nutrition Class     Diagnosis: Morbid Obesity    Bariatric Surgeon: Dr Erica Stalye    Surgery: Vertical Sleeve Gastrectomy    Class: first post op note    Topics discussed today include:     fluid goals post op, protein goals post op, constipation, chew food well, exercise, avoidance of alcohol, PPI use, diet progression, hypoglycemia, dumping syndrome, protein supplems, vitamin/mineral supplements, calcium supplements, additional vitamin B12 and iron supplements    Patient was able to verbalize basic diet (protein, fluid, vitamin and mineral) recommendations and possible nutrition-related complications   Yes

## 2021-04-14 ENCOUNTER — OFFICE VISIT (OUTPATIENT)
Dept: FAMILY MEDICINE CLINIC | Facility: CLINIC | Age: 41
End: 2021-04-14
Payer: COMMERCIAL

## 2021-04-14 ENCOUNTER — CLINICAL SUPPORT (OUTPATIENT)
Dept: BARIATRICS | Facility: CLINIC | Age: 41
End: 2021-04-14

## 2021-04-14 VITALS
HEART RATE: 78 BPM | HEIGHT: 67 IN | WEIGHT: 228.4 LBS | RESPIRATION RATE: 20 BRPM | TEMPERATURE: 97.4 F | OXYGEN SATURATION: 98 % | DIASTOLIC BLOOD PRESSURE: 80 MMHG | BODY MASS INDEX: 35.85 KG/M2 | SYSTOLIC BLOOD PRESSURE: 132 MMHG

## 2021-04-14 DIAGNOSIS — Z98.84 BARIATRIC SURGERY STATUS: Primary | ICD-10-CM

## 2021-04-14 DIAGNOSIS — M21.41 FLAT FEET, BILATERAL: ICD-10-CM

## 2021-04-14 DIAGNOSIS — M21.072 ACQUIRED EVERSION OF LEFT FOOT: ICD-10-CM

## 2021-04-14 DIAGNOSIS — M21.42 FLAT FEET, BILATERAL: ICD-10-CM

## 2021-04-14 DIAGNOSIS — M25.562 ACUTE PAIN OF LEFT KNEE: ICD-10-CM

## 2021-04-14 DIAGNOSIS — M21.70 UNEQUAL LEG LENGTH: Primary | ICD-10-CM

## 2021-04-14 PROCEDURE — RECHECK: Performed by: DIETITIAN, REGISTERED

## 2021-04-14 PROCEDURE — 99214 OFFICE O/P EST MOD 30 MIN: CPT | Performed by: FAMILY MEDICINE

## 2021-04-14 PROCEDURE — 1036F TOBACCO NON-USER: CPT | Performed by: FAMILY MEDICINE

## 2021-04-14 PROCEDURE — 3008F BODY MASS INDEX DOCD: CPT | Performed by: FAMILY MEDICINE

## 2021-04-14 NOTE — PROGRESS NOTES
Weight Management Nutrition Virtual Class     Class was provided via Adele Lyles    Bariatric Surgeon: Dr Cathy Sheffield    Surgery: Vertical Sleeve Gastrectomy    Class: 5 week post op     Topics discussed today include:     fluid goals post op, protein goals post op, constipation, chew food well, exercise, avoidance of alcohol, PPI use, diet progression, hypoglycemia, dumping syndrome, protein supplems, vitamin/mineral supplements, calcium supplements, additional vitamin B12, iron supplements and fat soluble vitamins     Patient was able to verbalize basic diet (protein, fluid, vitamin and mineral) recommendations and possible nutrition-related complications   Yes

## 2021-04-14 NOTE — PROGRESS NOTES
Chief Complaint   Patient presents with    Knee Pain     left knee, right knee is hurting a bit       Assessment/Plan:  Orthotics, foot lifts, LL study and follow up  Short about 4 - 5 mm  PHQ-9 Depression Screening    PHQ-9:   Frequency of the following problems over the past two weeks:      Little interest or pleasure in doing things: 0 - not at all  Feeling down, depressed, or hopeless: 0 - not at all       BMI Counseling: Body mass index is 35 77 kg/m²  The BMI is above normal  Nutrition recommendations include reducing portion sizes, consuming healthier snacks, moderation in carbohydrate intake and increasing intake of lean protein  Diagnoses and all orders for this visit:    Unequal leg length  -     CT leg length evaluation (scanogram); Future    Acute pain of left knee    Acquired eversion of left foot    Flat feet, bilateral          Subjective:      Patient ID: Patricia Corley is a 38 y o  female  Knn pains  Weight down 30 lbs past 6 weeks, S/P weight loss surgery  Following diet, walking and exercising  Knees started hurting  The following portions of the patient's history were reviewed and updated as appropriate: allergies, current medications, past family history, past medical history, past social history, past surgical history and problem list   I have spent 25 minutes with Patient  today in which greater than 50% of this time was spent in counseling/coordination of care regarding Diagnostic results, Intructions for management, Patient and family education, Importance of tx compliance, Risk factor reductions and Impressions  Review of Systems   Constitutional: Negative  HENT: Negative  Eyes: Negative  Respiratory: Negative  Cardiovascular: Negative  Gastrointestinal: Negative  Genitourinary: Negative  Musculoskeletal: Negative  Skin: Negative  Neurological: Negative  Psychiatric/Behavioral: Negative            Objective:      /80 (BP Location: Left arm)   Pulse 78   Temp (!) 97 4 °F (36 3 °C) (Temporal)   Resp 20   Ht 5' 7" (1 702 m)   Wt 104 kg (228 lb 6 4 oz)   SpO2 98%   BMI 35 77 kg/m²       Current Outpatient Medications:     Cetirizine HCl (ZYRTEC PO), Take 10 mg by mouth daily , Disp: , Rfl:     etonogestrel-ethinyl estradiol (NuvaRing) 0 12-0 015 MG/24HR vaginal ring, Insert 1 each into the vagina every 28 days, Disp: 4 each, Rfl: 3    fluocinolone (SYNALAR) 0 025 % ointment, daily at bedtime Around the mouth, Disp: , Rfl: 2    multivitamin (THERAGRAN) TABS, Take 1 tablet by mouth daily, Disp: , Rfl:     enoxaparin (LOVENOX) 40 mg/0 4 mL, Inject 0 4 mL (40 mg total) under the skin daily for 13 days, Disp: 13 Syringe, Rfl: 0    omeprazole (PriLOSEC) 20 mg delayed release capsule, Take 1 capsule (20 mg total) by mouth daily (Patient not taking: Reported on 4/14/2021), Disp: 30 capsule, Rfl: 3    oxyCODONE (ROXICODONE) 5 mg immediate release tablet, Take 1 tablet (5 mg total) by mouth every 4 (four) hours as needed for moderate painMax Daily Amount: 30 mg (Patient not taking: Reported on 3/8/2021), Disp: 10 tablet, Rfl: 0    No Known Allergies   Physical Exam  Constitutional:       Appearance: She is well-developed  HENT:      Head: Normocephalic and atraumatic  Right Ear: External ear normal       Left Ear: External ear normal       Nose: Nose normal    Eyes:      Conjunctiva/sclera: Conjunctivae normal       Pupils: Pupils are equal, round, and reactive to light  Neck:      Musculoskeletal: Normal range of motion and neck supple  Cardiovascular:      Rate and Rhythm: Normal rate and regular rhythm  Heart sounds: Normal heart sounds  Pulmonary:      Effort: Pulmonary effort is normal       Breath sounds: Normal breath sounds  Abdominal:      General: Bowel sounds are normal       Palpations: Abdomen is soft     Musculoskeletal:      Comments: Stand: Superior left innominate ( correction to last note )  Knees normal to exam, all ligaments intact, no miniscal signs  Skin:     General: Skin is warm and dry  Neurological:      Mental Status: She is alert and oriented to person, place, and time  Deep Tendon Reflexes: Reflexes are normal and symmetric  Psychiatric:         Behavior: Behavior normal          Thought Content:  Thought content normal          Judgment: Judgment normal

## 2021-04-29 ENCOUNTER — HOSPITAL ENCOUNTER (OUTPATIENT)
Dept: RADIOLOGY | Age: 41
Discharge: HOME | End: 2021-04-29
Payer: COMMERCIAL

## 2021-04-29 DIAGNOSIS — M21.70 UNEQUAL LEG LENGTH: ICD-10-CM

## 2021-04-29 PROCEDURE — 77073 BONE LENGTH STUDIES: CPT

## 2021-05-10 ENCOUNTER — TELEPHONE (OUTPATIENT)
Dept: FAMILY MEDICINE CLINIC | Facility: CLINIC | Age: 41
End: 2021-05-10

## 2021-05-10 NOTE — TELEPHONE ENCOUNTER
Called pt gave website to order foot lifts, pt verbalized understanding and will call back when she is ready to schedule

## 2021-06-01 ENCOUNTER — OFFICE VISIT (OUTPATIENT)
Dept: FAMILY MEDICINE CLINIC | Facility: CLINIC | Age: 41
End: 2021-06-01
Payer: COMMERCIAL

## 2021-06-01 DIAGNOSIS — M99.05 PELVIC SOMATIC DYSFUNCTION: ICD-10-CM

## 2021-06-01 DIAGNOSIS — S39.013A STRAIN OF MUSCLE, FASCIA AND TENDON OF PELVIS, INITIAL ENCOUNTER: ICD-10-CM

## 2021-06-01 DIAGNOSIS — G89.29 CHRONIC PAIN OF LEFT KNEE: ICD-10-CM

## 2021-06-01 DIAGNOSIS — S39.012A LUMBAR STRAIN, INITIAL ENCOUNTER: ICD-10-CM

## 2021-06-01 DIAGNOSIS — M99.03 SEGMENTAL AND SOMATIC DYSFUNCTION OF LUMBAR REGION: ICD-10-CM

## 2021-06-01 DIAGNOSIS — G89.29 CHRONIC RIGHT-SIDED LOW BACK PAIN WITHOUT SCIATICA: ICD-10-CM

## 2021-06-01 DIAGNOSIS — S39.012A STRAIN, SACRAL, INITIAL ENCOUNTER: ICD-10-CM

## 2021-06-01 DIAGNOSIS — M21.70 UNEQUAL LEG LENGTH: Primary | ICD-10-CM

## 2021-06-01 DIAGNOSIS — M99.04 SACRAL REGION SOMATIC DYSFUNCTION: ICD-10-CM

## 2021-06-01 DIAGNOSIS — M25.562 CHRONIC PAIN OF LEFT KNEE: ICD-10-CM

## 2021-06-01 DIAGNOSIS — M54.50 CHRONIC RIGHT-SIDED LOW BACK PAIN WITHOUT SCIATICA: ICD-10-CM

## 2021-06-01 PROCEDURE — 99214 OFFICE O/P EST MOD 30 MIN: CPT | Performed by: FAMILY MEDICINE

## 2021-06-01 PROCEDURE — 98926 OSTEOPATH MANJ 3-4 REGIONS: CPT | Performed by: FAMILY MEDICINE

## 2021-06-01 NOTE — PROGRESS NOTES
Assessment/Plan:  Need 9 mm added to bottom of right shoes  Putting 9 mm of lifts in shoes will end up hurting the toes  Added 8 mm today, distal foot getting tight in shoe  No problem-specific Assessment & Plan notes found for this encounter  Diagnoses and all orders for this visit:    Unequal leg length    Chronic right-sided low back pain without sciatica    Chronic pain of left knee    Lumbar strain, initial encounter    Segmental and somatic dysfunction of lumbar region    Sacral region somatic dysfunction    Strain, sacral, initial encounter    Pelvic somatic dysfunction    Strain of muscle, fascia and tendon of pelvis, initial encounter    Other orders  -     OMT          Subjective:      Patient ID: Salena Rodriguez is a 38 y o  female  Review LL study: R < L by 9 mm  Right low back pain, left knee pain  The following portions of the patient's history were reviewed and updated as appropriate: allergies, current medications, past medical history, past social history, past surgical history and problem list     Review of Systems   Constitutional: Negative  Musculoskeletal: Negative  Skin: Negative  Neurological: Negative  Psychiatric/Behavioral: Negative  Objective: There were no vitals taken for this visit  Physical Exam  Constitutional:       Appearance: Normal appearance  Musculoskeletal:      Comments: Prone: Inferior left psis, sacrum rotated right, low lumbar rotated left  Stand: Inferior right innominate without lifts - significant  Skin:     General: Skin is warm and dry  Neurological:      General: No focal deficit present  Mental Status: She is alert and oriented to person, place, and time  Psychiatric:         Mood and Affect: Mood normal          Behavior: Behavior normal          Thought Content:  Thought content normal          Judgment: Judgment normal

## 2021-06-01 NOTE — PROGRESS NOTES
OMT  Performed by: Lara Hurtado DO  Authorized by: Lara Hurtado DO     Verbal consent obtained?: Yes    Risks and benefits: Risks, benefits and alternatives were discussed    Consent given by:  Patient  Procedure Details:     Region evaluated and treated:  Lumbar, Sacrum/Pelvis and Pelvis Innominate    Lumbar details:     Examination Method:  Asymmetry, Misalignment, Crepitation, Defects, Masses and Tenderness, Pain    Severity:  Mild    Treatment Method:  High Velocity, Low Amplitude Treatment and Soft Tissue Treatment    Response:  Resolved - The somatic dysfunction is completely resolved without evidence of it ever having been present  Sacrum/Pelvis details:     Examination Method:  Asymmetry, Misalignment, Crepitation, Defects, Masses and Tenderness, Pain    Severity:  Mild    Treatment Method:  High Velocity, Low Amplitude Treatment    Response:  Resolved - The somatic dysfunction is completely resolved without evidence of it ever having been present  Pelvis Innominate details:     Examination Method:  Asymmetry, Misalignment, Crepitation, Defects, Masses    Severity:  Mild    Treatment Method:  High Velocity, Low Amplitude Treatment    Response:  Resolved - The somatic dysfunction is completely resolved without evidence of it ever having been present      Total Regions Treated:  3

## 2021-06-02 ENCOUNTER — TELEPHONE (OUTPATIENT)
Dept: BARIATRICS | Facility: CLINIC | Age: 41
End: 2021-06-02

## 2021-06-02 NOTE — TELEPHONE ENCOUNTER
L/m for pt  She had sent a message asking about labs  Explained that the usual is to be given a slip at the 3 month appt, which she has coming up soon  If she had any other questions or vitamin issues she should call the office to check

## 2021-06-14 ENCOUNTER — OFFICE VISIT (OUTPATIENT)
Dept: BARIATRICS | Facility: CLINIC | Age: 41
End: 2021-06-14
Payer: COMMERCIAL

## 2021-06-14 VITALS
SYSTOLIC BLOOD PRESSURE: 110 MMHG | HEIGHT: 67 IN | DIASTOLIC BLOOD PRESSURE: 66 MMHG | WEIGHT: 199.5 LBS | BODY MASS INDEX: 31.31 KG/M2 | HEART RATE: 79 BPM | TEMPERATURE: 98.6 F | RESPIRATION RATE: 17 BRPM

## 2021-06-14 DIAGNOSIS — Z48.815 ENCOUNTER FOR SURGICAL AFTERCARE FOLLOWING SURGERY OF DIGESTIVE SYSTEM: Primary | ICD-10-CM

## 2021-06-14 DIAGNOSIS — Z98.84 BARIATRIC SURGERY STATUS: ICD-10-CM

## 2021-06-14 DIAGNOSIS — E66.9 OBESITY, CLASS I, BMI 30-34.9: ICD-10-CM

## 2021-06-14 DIAGNOSIS — K91.2 POSTSURGICAL MALABSORPTION: ICD-10-CM

## 2021-06-14 DIAGNOSIS — K21.00 GASTROESOPHAGEAL REFLUX DISEASE WITH ESOPHAGITIS WITHOUT HEMORRHAGE: ICD-10-CM

## 2021-06-14 PROCEDURE — 1036F TOBACCO NON-USER: CPT | Performed by: PHYSICIAN ASSISTANT

## 2021-06-14 PROCEDURE — 99214 OFFICE O/P EST MOD 30 MIN: CPT | Performed by: PHYSICIAN ASSISTANT

## 2021-06-14 PROCEDURE — 3008F BODY MASS INDEX DOCD: CPT | Performed by: PHYSICIAN ASSISTANT

## 2021-06-14 NOTE — PROGRESS NOTES
Assessment/Plan:     Patient ID: Virginia Zarate is a 38 y o  female  Bariatric Surgery Status    -s/p Vertical Sleeve Gastrectomy with Dr Daysi Winn on 3/2/2021  Presents to the office today for 2nd postop  Doing well  · Continued/Maintain healthy weight loss with good nutrition intakes  · Adequate hydration with at least 64oz  fluid intake  · Follow diet as discussed  · Follow vitamin and mineral recommendations as reviewed with you  · Exercise as tolerated  · Colonoscopy referral made: out of age range    · Follow-up in 3 months  We kindly ask that your arrive 15 minutes before your scheduled appointment time with your provider to allow our staff to room you, get your vital signs and update your chart  · Get lab work done  Please call the office if you need a script  It is recommended to check with your insurance BEFORE getting labs done to make sure they are covered by your policy  · Call our office if you have any problems with abdominal pain especially associated with fever, chills, nausea, vomiting or any other concerns  · All  Post-bariatric surgery patients should be aware that very small quantities of any alcohol can cause impairment and it is very possible not to feel the effect  The effect can be in the system for several hours  It is also a stomach irritant  · It is advised to AVOID alcohol, Nonsteroidal antiinflammatory drugs (NSAIDS) and nicotine of all forms   Any of these can cause stomach irritation/pain  · Discussed the effects of alcohol on a bariatric patient and the increased impairment risk  · Keep up the good work!      Postsurgical Malabsorption   -At risk for malabsorption of vitamins/minerals secondary to malabsorption and restriction of intake from bariatric surgery  -Currently taking adequate postop bariatric surgery vitamin supplementation  -Next set of bariatric labs ordered for approximately 2 weeks  -Patient received education about the importance of adhering to a lifelong supplementation regimen to avoid vitamin/mineral deficiencies      Diagnoses and all orders for this visit:    Encounter for surgical aftercare following surgery of digestive system  -     Zinc; Future  -     Vitamin D 25 hydroxy; Future  -     Vitamin B1, whole blood; Future  -     Vitamin A; Future  -     Vitamin B12; Future  -     CBC and Platelet; Future  -     Comprehensive metabolic panel; Future  -     Ferritin; Future  -     Folate; Future  -     Iron Saturation %; Future  -     PTH, intact; Future    Bariatric surgery status  -     Zinc; Future  -     Vitamin D 25 hydroxy; Future  -     Vitamin B1, whole blood; Future  -     Vitamin A; Future  -     Vitamin B12; Future  -     CBC and Platelet; Future  -     Comprehensive metabolic panel; Future  -     Ferritin; Future  -     Folate; Future  -     Iron Saturation %; Future  -     PTH, intact; Future    Postsurgical malabsorption  -     Zinc; Future  -     Vitamin D 25 hydroxy; Future  -     Vitamin B1, whole blood; Future  -     Vitamin A; Future  -     Vitamin B12; Future  -     CBC and Platelet; Future  -     Comprehensive metabolic panel; Future  -     Ferritin; Future  -     Folate; Future  -     Iron Saturation %; Future  -     PTH, intact; Future    Gastroesophageal reflux disease with esophagitis without hemorrhage  -     Zinc; Future  -     Vitamin D 25 hydroxy; Future  -     Vitamin B1, whole blood; Future  -     Vitamin A; Future  -     Vitamin B12; Future  -     CBC and Platelet; Future  -     Comprehensive metabolic panel; Future  -     Ferritin; Future  -     Folate; Future  -     Iron Saturation %; Future  -     PTH, intact; Future    Obesity, Class I, BMI 30-34 9  -     Zinc; Future  -     Vitamin D 25 hydroxy; Future  -     Vitamin B1, whole blood; Future  -     Vitamin A; Future  -     Vitamin B12; Future  -     CBC and Platelet; Future  -     Comprehensive metabolic panel;  Future  -     Ferritin; Future  -     Folate; Future  -     Iron Saturation %; Future  -     PTH, intact; Future         Subjective:      Patient ID: Miriam Zarate is a 38 y o  female  -s/p Vertical Sleeve Gastrectomy with Dr Marleni Lezama on 3/2/2021  Presents to the office today for 2nd postop  Doing well  Initial:  Current:199 5  EWL: (Weight loss is ahead of schedule at this post surgical period )  Rohith:   Current BMI is Body mass index is 31 25 kg/m²  · Tolerating a regular diet-yes  · Eating at least 60 grams of protein per day-yes  · Following 30/60 minute rule with liquids-yes  · Drinking at least 64 ounces of fluid per day-yes  · Drinking carbonated beverages-no  · Sufficient exercise-yes  · Using NSAIDs regularly-no  · Using nicotine-no  · Using alcohol-no  · Supplements: antonia fusion + calcium chews    · EWL is 58%, which places the patient ahead of schedule for expected post surgical weight loss at this time  The following portions of the patient's history were reviewed and updated as appropriate: allergies, current medications, past family history, past medical history, past social history, past surgical history and problem list     Review of Systems   Constitutional: Negative  Respiratory: Negative  Cardiovascular: Negative  Gastrointestinal: Negative  Neurological: Negative  Psychiatric/Behavioral: Negative  Objective:    /66 (BP Location: Left arm, Patient Position: Sitting, Cuff Size: Large)   Pulse 79   Temp 98 6 °F (37 °C) (Tympanic)   Resp 17   Ht 5' 7" (1 702 m)   Wt 90 5 kg (199 lb 8 oz)   BMI 31 25 kg/m²      Physical Exam  Vitals and nursing note reviewed  Constitutional:       Appearance: Normal appearance  She is obese  HENT:      Head: Normocephalic and atraumatic  Eyes:      Extraocular Movements: Extraocular movements intact  Pupils: Pupils are equal, round, and reactive to light  Cardiovascular:      Rate and Rhythm: Normal rate and regular rhythm  Pulmonary:      Effort: Pulmonary effort is normal       Breath sounds: Normal breath sounds  Abdominal:      General: Bowel sounds are normal    Musculoskeletal:         General: Normal range of motion  Cervical back: Normal range of motion  Skin:     General: Skin is warm and dry  Neurological:      General: No focal deficit present  Mental Status: She is alert and oriented to person, place, and time     Psychiatric:         Mood and Affect: Mood normal          Behavior: Behavior normal

## 2021-06-18 DIAGNOSIS — E66.9 OBESITY, CLASS II, BMI 35-39.9: ICD-10-CM

## 2021-06-22 RX ORDER — OMEPRAZOLE 20 MG/1
CAPSULE, DELAYED RELEASE ORAL
Qty: 90 CAPSULE | Refills: 1 | Status: SHIPPED | OUTPATIENT
Start: 2021-06-22 | End: 2021-08-24 | Stop reason: ALTCHOICE

## 2021-07-09 ENCOUNTER — TELEMEDICINE (OUTPATIENT)
Dept: FAMILY MEDICINE CLINIC | Facility: CLINIC | Age: 41
End: 2021-07-09
Payer: COMMERCIAL

## 2021-07-09 DIAGNOSIS — R05.9 COUGH: ICD-10-CM

## 2021-07-09 DIAGNOSIS — J40 BRONCHITIS: Primary | ICD-10-CM

## 2021-07-09 PROCEDURE — 99213 OFFICE O/P EST LOW 20 MIN: CPT | Performed by: FAMILY MEDICINE

## 2021-07-09 PROCEDURE — 1036F TOBACCO NON-USER: CPT | Performed by: FAMILY MEDICINE

## 2021-07-09 RX ORDER — AZITHROMYCIN 250 MG/1
TABLET, FILM COATED ORAL
Qty: 6 TABLET | Refills: 0 | Status: SHIPPED | OUTPATIENT
Start: 2021-07-09 | End: 2021-07-14

## 2021-07-09 RX ORDER — METHYLPREDNISOLONE 4 MG/1
TABLET ORAL
Qty: 21 EACH | Refills: 0 | Status: SHIPPED | OUTPATIENT
Start: 2021-07-09 | End: 2021-08-24 | Stop reason: ALTCHOICE

## 2021-07-09 NOTE — PROGRESS NOTES
Virtual Regular Visit      Assessment/Plan:  Water for hydration, med's as directed  Problem List Items Addressed This Visit     None      Visit Diagnoses     Bronchitis    -  Primary    Relevant Medications    azithromycin (Zithromax) 250 mg tablet    Cough        Relevant Medications    methylPREDNISolone 4 MG tablet therapy pack               Reason for visit is   Chief Complaint   Patient presents with    Virtual Regular Visit        Encounter provider Indu Lugo DO    Provider located at [address] [place] [phone]      Recent Visits  No visits were found meeting these conditions  Showing recent visits within past 7 days and meeting all other requirements  Today's Visits  Date Type Provider Dept   07/09/21 [address] [place] [address]  today's visits and meeting all other requirements  Future Appointments  No visits were found meeting these conditions  Showing future appointments within next 150 days and meeting all other requirements       The patient was identified by name and date of birth  Nikita Castillo was informed that this is a telemedicine visit and that the visit is being conducted through [address] [place] [address] Now and patient was informed that this is a secure, HIPAA-compliant platform  She agrees to proceed     My office door was closed  No one else was in the room  She acknowledged consent and understanding of privacy and security of the video platform  The patient has agreed to participate and understands they can discontinue the visit at any time  Patient is aware this is a billable service  Subjective  Nikita Castillo is a 42 y o  female   Monday head congestion, a little moved into chest   Coughing started today with yellow production         Past Medical History:   Diagnosis Date    Arthritis     Asthma     allergy induced    Environmental allergies     dust    GERD (gastroesophageal reflux disease)     Migraine with aura     Osteoarthritis     Seasonal allergies     Wears contact lenses     wears at night       Past Surgical History:   Procedure Laterality Date    ANKLE SURGERY Left     ANKLE STABALIZATION    CHOLECYSTECTOMY  2015    COLONOSCOPY      JOINT REPLACEMENT  March 2013    Total left hip replacement    [place] LAP, DOMINGO RESTRICT PROC, LONGITUDINAL GASTRECTOMY N/A 3/2/2021    Procedure: ROBOTIC SLEEVE GASTRECTOMY; ROBOTIC PARAESOPHAGEAL HERNIA REPAIR;  Surgeon: Andriy Beebe MD;  Location: [facility];  Service: Bariatrics    TOOTH EXTRACTION      TOTAL HIP ARTHROPLASTY Left        Current Outpatient Medications   Medication Sig Dispense Refill    azithromycin (Zithromax) 250 mg tablet Take 2 tablets (500 mg total) by mouth daily for 1 day, THEN 1 tablet (250 mg total) daily for 4 days  6 tablet 0    Cetirizine HCl (ZYRTEC PO) Take 10 mg by mouth daily       etonogestrel-ethinyl estradiol (NuvaRing) 0 12-0 015 MG/24HR vaginal ring Insert 1 each into the vagina every 28 days 4 each 3    fluocinolone (SYNALAR) 0 025 % ointment daily at bedtime Around the mouth  2    methylPREDNISolone 4 MG tablet therapy pack Use as directed on package 21 each 0    multivitamin (THERAGRAN) TABS Take 1 tablet by mouth daily Bariatric fusion      omeprazole (PriLOSEC) 20 mg delayed release capsule TAKE 1 CAPSULE BY MOUTH EVERY DAY 90 capsule 1     No current facility-administered medications for this visit  No Known Allergies    Review of Systems   Constitutional: Negative  Respiratory: Positive for cough  Cardiovascular: Negative  Gastrointestinal: Negative  Genitourinary: Negative  Psychiatric/Behavioral: Negative  Video Exam    There were no vitals filed for this visit  Physical Exam  Constitutional:       Appearance: Normal appearance  Neurological:      Mental Status: She is alert and oriented to person, place, and time     Psychiatric:         Mood and Affect: Mood normal  Behavior: Behavior normal          Thought Content: Thought content normal          Judgment: Judgment normal           I spent 15 minutes directly with the patient during this visit      [facility] acknowledges that she has consented to an online visit or consultation  She understands that the online visit is based solely on information provided by her, and that, in the absence of a face-to-face physical evaluation by the physician, the diagnosis she receives is both limited and provisional in terms of accuracy and completeness  This is not intended to replace a full medical face-to-face evaluation by the physician  Shelli Aleman understands and accepts these terms

## 2021-07-09 NOTE — PROGRESS NOTES
PHQ-9 Depression Screening    PHQ-9:   Frequency of the following problems over the past two weeks:      Little interest or pleasure in doing things: 0 - not at all  Feeling down, depressed, or hopeless: 0 - not at all

## 2021-08-24 ENCOUNTER — ANNUAL EXAM (OUTPATIENT)
Dept: OBGYN CLINIC | Facility: CLINIC | Age: 41
End: 2021-08-24
Payer: COMMERCIAL

## 2021-08-24 VITALS
HEIGHT: 66 IN | BODY MASS INDEX: 29.63 KG/M2 | DIASTOLIC BLOOD PRESSURE: 62 MMHG | SYSTOLIC BLOOD PRESSURE: 120 MMHG | WEIGHT: 184.4 LBS

## 2021-08-24 DIAGNOSIS — Z12.31 ENCOUNTER FOR SCREENING MAMMOGRAM FOR MALIGNANT NEOPLASM OF BREAST: ICD-10-CM

## 2021-08-24 DIAGNOSIS — Z01.419 ENCNTR FOR GYN EXAM (GENERAL) (ROUTINE) W/O ABN FINDINGS: Primary | ICD-10-CM

## 2021-08-24 PROBLEM — Z01.810 PREOPERATIVE CARDIOVASCULAR EXAMINATION: Status: RESOLVED | Noted: 2020-11-08 | Resolved: 2021-08-24

## 2021-08-24 PROCEDURE — S0612 ANNUAL GYNECOLOGICAL EXAMINA: HCPCS | Performed by: PHYSICIAN ASSISTANT

## 2021-08-24 PROCEDURE — 3008F BODY MASS INDEX DOCD: CPT | Performed by: FAMILY MEDICINE

## 2021-08-24 NOTE — PROGRESS NOTES
Yoly Mac  1980      CC:  Yearly exam    S:  42 y o  female here for yearly exam      Sexual activity: She is not sexually active due to no partner    Over the past year she has had about three times that she has had one episode of dysuria and frequency which resolves within an hour  Contraception: She uses NuvaRing continuously for contraception  She is amenorrheic with this  Her chart now lists a history of migraine with aura  She says that in the past she had migraines but nothing in the past few years  She is aware to call with any development of migraines, as this would be a contraindication to estrogen due to increased risk of stroke  Last Pap 8/18/20 neg/neg  Last Mammo  9/14/20 neg  Last Colonoscopy never    We reviewed ASCCP guidelines for Pap testing today       Family hx of breast cancer: sister  Family hx of ovarian cancer: no  Family hx of colon cancer: no      Current Outpatient Medications:     Cetirizine HCl (ZYRTEC PO), Take 10 mg by mouth daily , Disp: , Rfl:     etonogestrel-ethinyl estradiol (NuvaRing) 0 12-0 015 MG/24HR vaginal ring, Insert 1 each into the vagina every 28 days, Disp: 4 each, Rfl: 3    fluocinolone (SYNALAR) 0 025 % ointment, daily at bedtime Around the mouth, Disp: , Rfl: 2    multivitamin (THERAGRAN) TABS, Take 1 tablet by mouth daily Bariatric fusion, Disp: , Rfl:   Social History     Socioeconomic History    Marital status: Single     Spouse name: Not on file    Number of children: Not on file    Years of education: Not on file    Highest education level: Not on file   Occupational History    Not on file   Tobacco Use    Smoking status: Never Smoker    Smokeless tobacco: Never Used   Vaping Use    Vaping Use: Never used   Substance and Sexual Activity    Alcohol use: Not Currently     Comment: Socially    Drug use: No    Sexual activity: Not Currently     Partners: Male     Birth control/protection: Abstinence, Ring   Other Topics Concern    Not on file   Social History Narrative    DAILY COFFEE CONSUMPTION (0) CUPS A DAY    SEDENTARY LIFESTYLE     Social Determinants of Health     Financial Resource Strain:     Difficulty of Paying Living Expenses:    Food Insecurity:     Worried About Running Out of Food in the Last Year:     [address] in the Last Year:    Transportation Needs:     Lack of Transportation (Medical):      Lack of Transportation (Non-Medical):    Physical Activity:     Days of Exercise per Week:     Minutes of Exercise per Session:    Stress:     Feeling of Stress :    Social Connections:     Frequency of Communication with Friends and Family:     Frequency of Social Gatherings with Friends and Family:     Attends Taoist Services:     Active Member of Clubs or Organizations:     Attends Club or Organization Meetings:     Marital Status:    Intimate Partner Violence:     Fear of Current or Ex-Partner:     Emotionally Abused:     Physically Abused:     Sexually Abused:      Family History   Problem Relation Age of Onset    Arthritis Mother     Hypertension Mother     Skin cancer Mother     Diabetes Father         MELLITUS    Hypertension Father     Anemia Sister     Celiac disease Sister     Thyroid disease Sister     Varicose Veins Sister         LOWER EXTREMITIES    Breast cancer Sister 40    Cancer Sister         Right breast cancer    Liver cancer Maternal Grandmother [de-identified]    Diabetes Maternal Grandmother     Cancer Maternal Grandmother         Liver cancer    Diabetes Paternal Grandmother         MELLIYUS    Dementia Paternal Grandmother     Diabetes Paternal Grandfather         MELLITUS    Stroke Paternal Grandfather     No Known Problems Maternal Grandfather     Stroke Paternal Uncle     Heart disease Neg Hx       Past Medical History:   Diagnosis Date    Arthritis     Asthma     allergy induced    Environmental allergies     dust    GERD (gastroesophageal reflux disease)     Migraine with aura     Osteoarthritis     Seasonal allergies     Wears contact lenses     wears at night        Review of Systems   Respiratory: Negative  Cardiovascular: Negative  Gastrointestinal: Negative for constipation and diarrhea  Genitourinary: Negative for difficulty urinating, pelvic pain, vaginal bleeding, vaginal discharge, itching or odor  O:  Blood pressure 120/62, height 5' 6 14" (1 68 m), weight 83 6 kg (184 lb 6 4 oz), last menstrual period 08/17/2021  Patient appears well and is not in distress  Neck is supple without masses  Breasts are symmetrical without mass, tenderness, nipple discharge, skin changes or adenopathy  Abdomen is soft and nontender without masses  External genitals are normal without lesions or rashes  Urethral meatus and urethra are normal  Bladder is normal to palpation  Vagina is normal without discharge or bleeding  Cervix is normal without discharge or lesion  Uterus is normal, mobile, nontender without palpable mass  Adnexa are normal, nontender, without palpable mass  A:  Yearly exam      P:   Pap 2025     Mammo slip provided    Colonoscopy at [address] [place] [address] sent to       RTO one year for yearly exam or sooner as needed

## 2021-08-28 ENCOUNTER — APPOINTMENT (OUTPATIENT)
Dept: LAB | Facility: HOSPITAL | Age: 41
End: 2021-08-28
Payer: COMMERCIAL

## 2021-08-28 DIAGNOSIS — E66.9 OBESITY, CLASS I, BMI 30-34.9: ICD-10-CM

## 2021-08-28 DIAGNOSIS — Z98.84 BARIATRIC SURGERY STATUS: ICD-10-CM

## 2021-08-28 DIAGNOSIS — K91.2 POSTSURGICAL MALABSORPTION: ICD-10-CM

## 2021-08-28 DIAGNOSIS — Z48.815 ENCOUNTER FOR SURGICAL AFTERCARE FOLLOWING SURGERY OF DIGESTIVE SYSTEM: ICD-10-CM

## 2021-08-28 DIAGNOSIS — K21.00 GASTROESOPHAGEAL REFLUX DISEASE WITH ESOPHAGITIS WITHOUT HEMORRHAGE: ICD-10-CM

## 2021-08-28 LAB
ALBUMIN SERPL BCP-MCNC: 3.1 G/DL (ref 3.5–5)
ALP SERPL-CCNC: 78 U/L (ref 46–116)
ALT SERPL W P-5'-P-CCNC: 21 U/L (ref 12–78)
ANION GAP SERPL CALCULATED.3IONS-SCNC: 3 MMOL/L (ref 4–13)
AST SERPL W P-5'-P-CCNC: 13 U/L (ref 5–45)
BILIRUB SERPL-MCNC: 0.47 MG/DL (ref 0.2–1)
BUN SERPL-MCNC: 16 MG/DL (ref 5–25)
CALCIUM ALBUM COR SERPL-MCNC: 9.4 MG/DL (ref 8.3–10.1)
CALCIUM SERPL-MCNC: 8.7 MG/DL (ref 8.3–10.1)
CHLORIDE SERPL-SCNC: 111 MMOL/L (ref 100–108)
CO2 SERPL-SCNC: 25 MMOL/L (ref 21–32)
CREAT SERPL-MCNC: 0.97 MG/DL (ref 0.6–1.3)
ERYTHROCYTE [DISTWIDTH] IN BLOOD BY AUTOMATED COUNT: 14.3 % (ref 11.6–15.1)
FERRITIN SERPL-MCNC: 37 NG/ML (ref 8–388)
FOLATE SERPL-MCNC: 18.9 NG/ML (ref 3.1–17.5)
GFR SERPL CREATININE-BSD FRML MDRD: 72 ML/MIN/1.73SQ M
GLUCOSE SERPL-MCNC: 88 MG/DL (ref 65–140)
HCT VFR BLD AUTO: 40.5 % (ref 34.8–46.1)
HGB BLD-MCNC: 12.8 G/DL (ref 11.5–15.4)
IRON SATN MFR SERPL: 21 %
IRON SERPL-MCNC: 67 UG/DL (ref 50–170)
MCH RBC QN AUTO: 28.1 PG (ref 26.8–34.3)
MCHC RBC AUTO-ENTMCNC: 31.6 G/DL (ref 31.4–37.4)
MCV RBC AUTO: 89 FL (ref 82–98)
PLATELET # BLD AUTO: 244 THOUSANDS/UL (ref 149–390)
PMV BLD AUTO: 11.3 FL (ref 8.9–12.7)
POTASSIUM SERPL-SCNC: 3.8 MMOL/L (ref 3.5–5.3)
PROT SERPL-MCNC: 6.9 G/DL (ref 6.4–8.2)
RBC # BLD AUTO: 4.55 MILLION/UL (ref 3.81–5.12)
SODIUM SERPL-SCNC: 139 MMOL/L (ref 136–145)
TIBC SERPL-MCNC: 320 UG/DL (ref 250–450)
VIT B12 SERPL-MCNC: 674 PG/ML (ref 100–900)
WBC # BLD AUTO: 7.11 THOUSAND/UL (ref 4.31–10.16)

## 2021-08-28 PROCEDURE — 83540 ASSAY OF IRON: CPT

## 2021-08-28 PROCEDURE — 84590 ASSAY OF VITAMIN A: CPT

## 2021-08-28 PROCEDURE — 82746 ASSAY OF FOLIC ACID SERUM: CPT

## 2021-08-28 PROCEDURE — 83970 ASSAY OF PARATHORMONE: CPT

## 2021-08-28 PROCEDURE — 85027 COMPLETE CBC AUTOMATED: CPT

## 2021-08-28 PROCEDURE — 80053 COMPREHEN METABOLIC PANEL: CPT

## 2021-08-28 PROCEDURE — 82607 VITAMIN B-12: CPT

## 2021-08-28 PROCEDURE — 84425 ASSAY OF VITAMIN B-1: CPT

## 2021-08-28 PROCEDURE — 84630 ASSAY OF ZINC: CPT

## 2021-08-28 PROCEDURE — 36415 COLL VENOUS BLD VENIPUNCTURE: CPT

## 2021-08-28 PROCEDURE — 82306 VITAMIN D 25 HYDROXY: CPT

## 2021-08-28 PROCEDURE — 82728 ASSAY OF FERRITIN: CPT

## 2021-08-28 PROCEDURE — 83550 IRON BINDING TEST: CPT

## 2021-08-29 LAB
25(OH)D3 SERPL-MCNC: 95.8 NG/ML (ref 30–100)
PTH-INTACT SERPL-MCNC: 21.1 PG/ML (ref 18.4–80.1)

## 2021-08-31 LAB
VIT A SERPL-MCNC: 46.6 UG/DL (ref 20.1–62)
VIT B1 BLD-SCNC: 85.9 NMOL/L (ref 66.5–200)

## 2021-09-04 LAB — ZINC SERPL-MCNC: 93 UG/DL (ref 44–115)

## 2021-09-07 ENCOUNTER — OFFICE VISIT (OUTPATIENT)
Dept: FAMILY MEDICINE CLINIC | Facility: CLINIC | Age: 41
End: 2021-09-07
Payer: COMMERCIAL

## 2021-09-07 VITALS
WEIGHT: 184.8 LBS | OXYGEN SATURATION: 96 % | HEIGHT: 66 IN | SYSTOLIC BLOOD PRESSURE: 136 MMHG | TEMPERATURE: 97.4 F | DIASTOLIC BLOOD PRESSURE: 78 MMHG | HEART RATE: 78 BPM | BODY MASS INDEX: 29.7 KG/M2

## 2021-09-07 DIAGNOSIS — M21.70 UNEQUAL LEG LENGTH: Primary | ICD-10-CM

## 2021-09-07 PROCEDURE — 99213 OFFICE O/P EST LOW 20 MIN: CPT | Performed by: FAMILY MEDICINE

## 2021-09-07 NOTE — PROGRESS NOTES
Chief Complaint   Patient presents with    Follow-up     footfts      Assessment/Plan:  Have shoes altered then follow up   BMI Counseling: Body mass index is 29 7 kg/m²  The BMI is above normal  Nutrition recommendations include reducing portion sizes, decreasing overall calorie intake, moderation in carbohydrate intake and increasing intake of lean protein  Diagnoses and all orders for this visit:    Unequal leg length          Subjective:      Patient ID: Jose L Salinas is a 42 y o  female  Had 8 mm in right shoe but too tight, developed blisters on back of foot  Lisa Hannon Has business card of  who can adjust the bottom of right shoe  Weight down about 80 lbs  The following portions of the patient's history were reviewed and updated as appropriate: allergies, current medications, past family history, past medical history, past social history, past surgical history and problem list     Review of Systems   Constitutional: Negative  HENT: Negative  Eyes: Negative  Respiratory: Negative  Cardiovascular: Negative  Gastrointestinal: Negative  Genitourinary: Negative  Musculoskeletal: Negative  Skin: Negative  Neurological: Negative  Psychiatric/Behavioral: Negative            Objective:      /78 (BP Location: Left arm, Patient Position: Sitting, Cuff Size: Standard)   Pulse 78   Temp (!) 97 4 °F (36 3 °C) (Temporal)   Ht 5' 6 14" (1 68 m)   Wt 83 8 kg (184 lb 12 8 oz)   LMP 08/17/2021   SpO2 96%   BMI 29 70 kg/m²       Current Outpatient Medications:     Cetirizine HCl (ZYRTEC PO), Take 10 mg by mouth daily , Disp: , Rfl:     etonogestrel-ethinyl estradiol (NuvaRing) 0 12-0 015 MG/24HR vaginal ring, Insert 1 each into the vagina every 28 days, Disp: 4 each, Rfl: 3    fluocinolone (SYNALAR) 0 025 % ointment, daily at bedtime Around the mouth, Disp: , Rfl: 2    multivitamin (THERAGRAN) TABS, Take 1 tablet by mouth daily Bariatric fusion, Disp: , Rfl: No Known Allergies    Past Surgical History:   Procedure Laterality Date    ANKLE SURGERY Left     ANKLE STABALIZATION    CHOLECYSTECTOMY  2015    COLONOSCOPY      JOINT REPLACEMENT  March 2013    Total left hip replacement    [place] LAP, DOMINGO RESTRICT PROC, LONGITUDINAL GASTRECTOMY N/A 3/2/2021    Procedure: ROBOTIC SLEEVE GASTRECTOMY; ROBOTIC PARAESOPHAGEAL HERNIA REPAIR;  Surgeon: Gonzales Quiroz MD;  Location[facility];  Service: Bariatrics    TOOTH EXTRACTION      TOTAL HIP ARTHROPLASTY Left           Physical Exam  Constitutional:       Appearance: Normal appearance  Musculoskeletal:      Comments: Stand: Inferior right innominate  Skin:     General: Skin is warm and dry  Neurological:      General: No focal deficit present  Mental Status: She is alert and oriented to person, place, and time  Psychiatric:         Mood and Affect: Mood normal          Behavior: Behavior normal          Thought Content:  Thought content normal          Judgment: Judgment normal

## 2021-09-08 ENCOUNTER — OFFICE VISIT (OUTPATIENT)
Dept: BARIATRICS | Facility: CLINIC | Age: 41
End: 2021-09-08
Payer: COMMERCIAL

## 2021-09-08 VITALS
HEART RATE: 67 BPM | DIASTOLIC BLOOD PRESSURE: 80 MMHG | HEIGHT: 67 IN | BODY MASS INDEX: 28.96 KG/M2 | SYSTOLIC BLOOD PRESSURE: 102 MMHG | TEMPERATURE: 98.6 F | WEIGHT: 184.5 LBS

## 2021-09-08 DIAGNOSIS — Z98.84 BARIATRIC SURGERY STATUS: ICD-10-CM

## 2021-09-08 DIAGNOSIS — K91.2 POSTSURGICAL MALABSORPTION: ICD-10-CM

## 2021-09-08 DIAGNOSIS — Z48.815 ENCOUNTER FOR SURGICAL AFTERCARE FOLLOWING SURGERY OF DIGESTIVE SYSTEM: Primary | ICD-10-CM

## 2021-09-08 PROCEDURE — 99214 OFFICE O/P EST MOD 30 MIN: CPT | Performed by: PHYSICIAN ASSISTANT

## 2021-09-08 PROCEDURE — 3008F BODY MASS INDEX DOCD: CPT | Performed by: PHYSICIAN ASSISTANT

## 2021-09-08 PROCEDURE — 1036F TOBACCO NON-USER: CPT | Performed by: PHYSICIAN ASSISTANT

## 2021-09-08 NOTE — PROGRESS NOTES
Assessment/Plan:     Patient ID: Mariaelena Michaels is a 42 y o  female  Bariatric Surgery Status    -s/p Vertical Sleeve Gastrectomy with Dr Shayy Brand on 3/2/2021  Presents to the office today for 3rd postop  Doing well  · Continued/Maintain healthy weight loss with good nutrition intakes  · Adequate hydration with at least 64oz  fluid intake  · Follow diet as discussed  · Follow vitamin and mineral recommendations as reviewed with you  · Exercise as tolerated  · Colonoscopy referral made: out of age range   · Mammogram: scheduled this month    · Follow-up in 6 months for annual  We kindly ask that your arrive 15 minutes before your scheduled appointment time with your provider to allow our staff to room you, get your vital signs and update your chart  ·   · Call our office if you have any problems with abdominal pain especially associated with fever, chills, nausea, vomiting or any other concerns  · All  Post-bariatric surgery patients should be aware that very small quantities of any alcohol can cause impairment and it is very possible not to feel the effect  The effect can be in the system for several hours  It is also a stomach irritant  · It is advised to AVOID alcohol, Nonsteroidal antiinflammatory drugs (NSAIDS) and nicotine of all forms   Any of these can cause stomach irritation/pain  · Discussed the effects of alcohol on a bariatric patient and the increased impairment risk  · Keep up the good work!      Postsurgical Malabsorption   -At risk for malabsorption of vitamins/minerals secondary to malabsorption and restriction of intake from bariatric surgery  -Currently taking adequate postop bariatric surgery vitamin supplementation  -Last set of bariatric labs completed 8/2021 and wnl  -Next set of bariatric labs ordered for approximately 6 months  -Patient received education about the importance of adhering to a lifelong supplementation regimen to avoid vitamin/mineral deficiencies      Diagnoses and all orders for this visit:    Encounter for surgical aftercare following surgery of digestive system    Bariatric surgery status    Postsurgical malabsorption         Subjective:      Patient ID: Vinod Roberts is a 42 y o  female  -s/p Vertical Sleeve Gastrectomy with Dr Anusha Soni on 3/2/2021  Presents to the office today for 3rd postop  Doing well  Initial: 265  Current: 184  EWL: (Weight loss is ahead of schedule at this post surgical period )  Rohith: current   Current BMI is Body mass index is 28 9 kg/m²  · Tolerating a regular diet-yes  · Eating at least 60 grams of protein per day-yes  · Following 30/60 minute rule with liquids-yes  · Drinking at least 64 ounces of fluid per day-yes  · Drinking carbonated beverages-no  · Sufficient exercise-yes  · Using NSAIDs regularly-no  · Using nicotine-no  · Using alcohol-no  · Supplements: antonia fusion with calcium     · EWL is 74%, which places the patient ahead of schedule for expected post surgical weight loss at this time  The following portions of the patient's history were reviewed and updated as appropriate: allergies, current medications, past family history, past medical history, past social history, past surgical history and problem list     Review of Systems   Constitutional: Negative  Respiratory: Negative  Cardiovascular: Negative  Gastrointestinal: Negative  Neurological: Negative  Psychiatric/Behavioral: Negative  Objective:    /80 (BP Location: Left arm, Patient Position: Sitting, Cuff Size: Standard)   Pulse 67   Temp 98 6 °F (37 °C) (Tympanic)   Ht 5' 7" (1 702 m)   Wt 83 7 kg (184 lb 8 oz)   LMP 08/17/2021   BMI 28 90 kg/m²      Physical Exam  Vitals and nursing note reviewed  Constitutional:       Appearance: Normal appearance  She is obese  HENT:      Head: Normocephalic and atraumatic  Eyes:      Extraocular Movements: Extraocular movements intact        Pupils: Pupils are equal, round, and reactive to light  Cardiovascular:      Rate and Rhythm: Normal rate and regular rhythm  Pulmonary:      Effort: Pulmonary effort is normal       Breath sounds: Normal breath sounds  Abdominal:      General: Bowel sounds are normal    Musculoskeletal:         General: Normal range of motion  Cervical back: Normal range of motion  Skin:     General: Skin is warm and dry  Neurological:      General: No focal deficit present  Mental Status: She is alert and oriented to person, place, and time     Psychiatric:         Mood and Affect: Mood normal

## 2021-09-08 NOTE — PATIENT INSTRUCTIONS
· Follow-up in 6 months for annual  We kindly ask that your arrive 15 minutes before your scheduled appointment time with your provider to allow our staff to room you, get your vital signs and update your chart  ·   · Call our office if you have any problems with abdominal pain especially associated with fever, chills, nausea, vomiting or any other concerns  · All  Post-bariatric surgery patients should be aware that very small quantities of any alcohol can cause impairment and it is very possible not to feel the effect  The effect can be in the system for several hours  It is also a stomach irritant  · It is advised to AVOID alcohol, Nonsteroidal antiinflammatory drugs (NSAIDS) and nicotine of all forms   Any of these can cause stomach irritation/pain  · Discussed the effects of alcohol on a bariatric patient and the increased impairment risk  · Keep up the good work!

## 2021-09-20 ENCOUNTER — HOSPITAL ENCOUNTER (OUTPATIENT)
Dept: MAMMOGRAPHY | Facility: MEDICAL CENTER | Age: 41
Discharge: HOME | End: 2021-09-20
Payer: COMMERCIAL

## 2021-09-20 VITALS — WEIGHT: 184 LBS | BODY MASS INDEX: 28.88 KG/M2 | HEIGHT: 67 IN

## 2021-09-20 DIAGNOSIS — Z12.31 ENCOUNTER FOR SCREENING MAMMOGRAM FOR MALIGNANT NEOPLASM OF BREAST: ICD-10-CM

## 2021-09-20 PROCEDURE — 77063 BREAST TOMOSYNTHESIS BI: CPT

## 2021-09-20 PROCEDURE — 77067 SCR MAMMO BI INCL CAD: CPT

## 2021-10-18 ENCOUNTER — OFFICE VISIT (OUTPATIENT)
Dept: FAMILY MEDICINE CLINIC | Facility: CLINIC | Age: 41
End: 2021-10-18
Payer: COMMERCIAL

## 2021-10-18 VITALS
BODY MASS INDEX: 28.72 KG/M2 | SYSTOLIC BLOOD PRESSURE: 110 MMHG | TEMPERATURE: 97.8 F | WEIGHT: 183 LBS | HEART RATE: 85 BPM | DIASTOLIC BLOOD PRESSURE: 78 MMHG | HEIGHT: 67 IN | OXYGEN SATURATION: 98 %

## 2021-10-18 DIAGNOSIS — J01.00 ACUTE NON-RECURRENT MAXILLARY SINUSITIS: Primary | ICD-10-CM

## 2021-10-18 PROCEDURE — 3008F BODY MASS INDEX DOCD: CPT | Performed by: FAMILY MEDICINE

## 2021-10-18 PROCEDURE — 99213 OFFICE O/P EST LOW 20 MIN: CPT | Performed by: FAMILY MEDICINE

## 2021-10-18 PROCEDURE — 3725F SCREEN DEPRESSION PERFORMED: CPT | Performed by: FAMILY MEDICINE

## 2021-10-18 PROCEDURE — 1036F TOBACCO NON-USER: CPT | Performed by: FAMILY MEDICINE

## 2021-10-18 RX ORDER — METHYLPREDNISOLONE 4 MG/1
TABLET ORAL
Qty: 21 EACH | Refills: 0 | Status: SHIPPED | OUTPATIENT
Start: 2021-10-18 | End: 2022-04-04 | Stop reason: ALTCHOICE

## 2021-10-18 RX ORDER — AZITHROMYCIN 250 MG/1
TABLET, FILM COATED ORAL
Qty: 6 TABLET | Refills: 0 | Status: SHIPPED | OUTPATIENT
Start: 2021-10-18 | End: 2021-10-23

## 2021-11-05 DIAGNOSIS — Z30.44 ENCOUNTER FOR SURVEILLANCE OF VAGINAL RING HORMONAL CONTRACEPTIVE DEVICE: ICD-10-CM

## 2021-11-08 RX ORDER — ETONOGESTREL AND ETHINYL ESTRADIOL 11.7; 2.7 MG/1; MG/1
1 INSERT, EXTENDED RELEASE VAGINAL
Qty: 4 EACH | Refills: 3 | Status: SHIPPED | OUTPATIENT
Start: 2021-11-08

## 2021-12-15 ENCOUNTER — DOCUMENTATION (OUTPATIENT)
Dept: BARIATRICS | Facility: CLINIC | Age: 41
End: 2021-12-15

## 2022-02-08 ENCOUNTER — OFFICE VISIT (OUTPATIENT)
Dept: OBGYN CLINIC | Facility: CLINIC | Age: 42
End: 2022-02-08
Payer: COMMERCIAL

## 2022-02-08 VITALS
HEIGHT: 67 IN | SYSTOLIC BLOOD PRESSURE: 112 MMHG | DIASTOLIC BLOOD PRESSURE: 74 MMHG | TEMPERATURE: 97.8 F | OXYGEN SATURATION: 99 % | WEIGHT: 183 LBS | BODY MASS INDEX: 28.72 KG/M2 | RESPIRATION RATE: 18 BRPM | HEART RATE: 86 BPM

## 2022-02-08 DIAGNOSIS — G89.29 CHRONIC HIP PAIN, RIGHT: Primary | ICD-10-CM

## 2022-02-08 DIAGNOSIS — M16.11 PRIMARY OSTEOARTHRITIS OF ONE HIP, RIGHT: ICD-10-CM

## 2022-02-08 DIAGNOSIS — M25.551 CHRONIC HIP PAIN, RIGHT: Primary | ICD-10-CM

## 2022-02-08 PROCEDURE — 99204 OFFICE O/P NEW MOD 45 MIN: CPT | Performed by: FAMILY MEDICINE

## 2022-02-08 NOTE — PROGRESS NOTES
[facility] [address]  [id number]  [address] [address] [phone]      Chief Complaint:  Chief Complaint   Patient presents with    Right Hip - Pain, Numbness       Vitals:  /74   Pulse 86   Temp 97 8 °F (36 6 °C)   Resp 18   Ht 5' 7" (1 702 m)   Wt 83 kg (183 lb)   SpO2 99%   BMI 28 66 kg/m²     The following portions of the patient's history were reviewed and updated as appropriate: allergies, current medications, past family history, past medical history, past social history, past surgical history, and problem list       Subjective:   Patient ID: Ed Wade is a 42 y o  female  Here c/o R hip pain/knee pain  1) R hip pain  Pain started about 4-6 months  Denies injury  Hx of OA in L hip- with STEPHANIE by Dr Delores Mcwilliams in 2013  Dec flexibility  Worse with stretching/stairs, walking  Worse after leg length discrepancy and placed in heel lift- stopped using  Sharp pain/dull ache at times  Better at rest   No pain meds    2) R knee pain-  Worse than hip  Denies trauma  Worse with prolonged walking and getting up  Sharp/dull pain  Limping at times  Review of Systems   Constitutional: Negative for fatigue and fever  Respiratory: Negative for shortness of breath  Cardiovascular: Negative for chest pain  Gastrointestinal: Negative for abdominal pain and nausea  Genitourinary: Negative for dysuria  Musculoskeletal: Positive for arthralgias and gait problem  Skin: Negative for rash and wound  Neurological: Negative for weakness and headaches  Objective:  Right Knee Exam   Right knee exam is normal     Muscle Strength   The patient has normal right knee strength  Tenderness   The patient is experiencing no tenderness  Range of Motion   The patient has normal right knee ROM      Tests   Yolanda:  Medial - negative Lateral - negative  Varus: negative Valgus: negative      Right Hip Exam     Tenderness   The patient is experiencing tenderness in the anterior  Range of Motion   The patient has normal right hip ROM  Muscle Strength   The patient has normal right hip strength  Tests   ASHUTOSH: positive    Comments:  Pain with all ROM            Physical Exam  Constitutional:       Appearance: Normal appearance  She is normal weight  HENT:      Head: Normocephalic  Eyes:      Extraocular Movements: Extraocular movements intact  Pulmonary:      Effort: Pulmonary effort is normal    Musculoskeletal:         General: Tenderness present  Cervical back: Normal range of motion  Right knee:      Instability Tests: Medial Yolanda test negative and lateral Yolanda test negative  Skin:     General: Skin is warm and dry  Neurological:      General: No focal deficit present  Mental Status: She is alert and oriented to person, place, and time  Mental status is at baseline  Psychiatric:         Mood and Affect: Mood normal          Behavior: Behavior normal          Thought Content: Thought content normal          Judgment: Judgment normal          I have personally reviewed pertinent films in PACS and my interpretation is XR-  R hip- mod DJD  Assessment/Plan:  Assessment/Plan   Diagnoses and all orders for this visit:    Chronic hip pain, right  -     XR hip/pelv 2-3 vws right if performed; Future    Primary osteoarthritis of one hip, right        Return in about 2 weeks (around 2/22/2022) for Recheck       Sona Larsen MD

## 2022-02-21 ENCOUNTER — OFFICE VISIT (OUTPATIENT)
Dept: FAMILY MEDICINE CLINIC | Facility: CLINIC | Age: 42
End: 2022-02-21
Payer: COMMERCIAL

## 2022-02-21 VITALS
BODY MASS INDEX: 29.19 KG/M2 | HEART RATE: 81 BPM | OXYGEN SATURATION: 98 % | TEMPERATURE: 97.7 F | SYSTOLIC BLOOD PRESSURE: 120 MMHG | HEIGHT: 67 IN | WEIGHT: 186 LBS | DIASTOLIC BLOOD PRESSURE: 76 MMHG

## 2022-02-21 DIAGNOSIS — J01.10 ACUTE NON-RECURRENT FRONTAL SINUSITIS: Primary | ICD-10-CM

## 2022-02-21 PROCEDURE — 1036F TOBACCO NON-USER: CPT | Performed by: FAMILY MEDICINE

## 2022-02-21 PROCEDURE — 99213 OFFICE O/P EST LOW 20 MIN: CPT | Performed by: FAMILY MEDICINE

## 2022-02-21 RX ORDER — METHYLPREDNISOLONE 4 MG/1
TABLET ORAL
Qty: 21 EACH | Refills: 0 | Status: SHIPPED | OUTPATIENT
Start: 2022-02-21 | End: 2022-04-04 | Stop reason: ALTCHOICE

## 2022-02-21 RX ORDER — AZITHROMYCIN 250 MG/1
TABLET, FILM COATED ORAL
Qty: 6 TABLET | Refills: 0 | Status: SHIPPED | OUTPATIENT
Start: 2022-02-21 | End: 2022-02-26

## 2022-02-21 NOTE — PROGRESS NOTES
Chief Complaint   Patient presents with    Sinus Problem     3 days    Nasal Congestion     Assessment/Plan:  Lubricate nasal septum 2 - 6 times a day  Diagnoses and all orders for this visit:    Acute non-recurrent frontal sinusitis  -     azithromycin (Zithromax) 250 mg tablet; Take 2 tablets (500 mg total) by mouth daily for 1 day, THEN 1 tablet (250 mg total) daily for 4 days  -     methylPREDNISolone 4 MG tablet therapy pack; Use as directed on package          Subjective:      Patient ID: Cristina Contreras is a 42 y o  female  Face pressure Friday night / Saturday morning  Temp in home 782 degrees  The following portions of the patient's history were reviewed and updated as appropriate: allergies, current medications, past medical history, past social history, past surgical history and problem list     Review of Systems   Constitutional: Negative  HENT: Positive for congestion and sinus pressure  Eyes: Negative  Respiratory: Negative  Cardiovascular: Negative  Gastrointestinal: Negative  Genitourinary: Negative  Musculoskeletal: Negative  Skin: Negative  Neurological: Negative  Psychiatric/Behavioral: Negative            Objective:      /76 (BP Location: Left arm, Patient Position: Sitting, Cuff Size: Standard)   Pulse 81   Temp 97 7 °F (36 5 °C) (Temporal)   Ht 5' 7" (1 702 m)   Wt 84 4 kg (186 lb)   SpO2 98%   BMI 29 13 kg/m²       Current Outpatient Medications:     Cetirizine HCl (ZYRTEC PO), Take 10 mg by mouth daily , Disp: , Rfl:     etonogestrel-ethinyl estradiol (NuvaRing) 0 12-0 015 MG/24HR vaginal ring, Insert 1 each into the vagina every 28 days, Disp: 4 each, Rfl: 3    fluocinolone (SYNALAR) 0 025 % ointment, daily at bedtime Around the mouth, Disp: , Rfl: 2    multivitamin (THERAGRAN) TABS, Take 1 tablet by mouth daily Bariatric fusion, Disp: , Rfl:     methylPREDNISolone 4 MG tablet therapy pack, Use as directed on package, Disp: 21 each, Rfl: 0  No Known Allergies  Past Surgical History:   Procedure Laterality Date    ANKLE SURGERY Left     ANKLE STABALIZATION    CHOLECYSTECTOMY  2015    COLONOSCOPY      JOINT REPLACEMENT  March 2013    Total left hip replacement    IN LAP, DOMINGO RESTRICT PROC, LONGITUDINAL GASTRECTOMY N/A 3/2/2021    Procedure: ROBOTIC SLEEVE GASTRECTOMY; ROBOTIC PARAESOPHAGEAL HERNIA REPAIR;  Surgeon: Ronald Alarcon MD;  [facility]: [facility];  Service: Bariatrics    TOOTH EXTRACTION      TOTAL HIP ARTHROPLASTY Left           Physical Exam  Constitutional:       Appearance: She is well-developed  HENT:      Head: Normocephalic and atraumatic  Right Ear: External ear normal       Left Ear: External ear normal       Nose: Nose normal       Comments: Nasal septum is irritated  Eyes:      Conjunctiva/sclera: Conjunctivae normal       Pupils: Pupils are equal, round, and reactive to light  Cardiovascular:      Rate and Rhythm: Normal rate and regular rhythm  Heart sounds: Normal heart sounds  Pulmonary:      Effort: Pulmonary effort is normal       Breath sounds: Normal breath sounds  Abdominal:      General: Bowel sounds are normal       Palpations: Abdomen is soft  Musculoskeletal:      Cervical back: Normal range of motion and neck supple  Skin:     General: Skin is warm and dry  Neurological:      Mental Status: She is alert and oriented to person, place, and time  Deep Tendon Reflexes: Reflexes are normal and symmetric  Psychiatric:         Behavior: Behavior normal          Thought Content:  Thought content normal          Judgment: Judgment normal

## 2022-02-24 ENCOUNTER — OFFICE VISIT (OUTPATIENT)
Dept: OBGYN CLINIC | Facility: CLINIC | Age: 42
End: 2022-02-24
Payer: COMMERCIAL

## 2022-02-24 VITALS
SYSTOLIC BLOOD PRESSURE: 117 MMHG | BODY MASS INDEX: 29.19 KG/M2 | WEIGHT: 186 LBS | DIASTOLIC BLOOD PRESSURE: 74 MMHG | HEIGHT: 67 IN | HEART RATE: 93 BPM

## 2022-02-24 DIAGNOSIS — M16.11 PRIMARY OSTEOARTHRITIS OF ONE HIP, RIGHT: Primary | ICD-10-CM

## 2022-02-24 DIAGNOSIS — M25.551 CHRONIC HIP PAIN, RIGHT: ICD-10-CM

## 2022-02-24 DIAGNOSIS — G89.29 CHRONIC HIP PAIN, RIGHT: ICD-10-CM

## 2022-02-24 PROCEDURE — 3008F BODY MASS INDEX DOCD: CPT | Performed by: FAMILY MEDICINE

## 2022-02-24 PROCEDURE — 20611 DRAIN/INJ JOINT/BURSA W/US: CPT | Performed by: FAMILY MEDICINE

## 2022-02-24 RX ORDER — TRIAMCINOLONE ACETONIDE 40 MG/ML
40 INJECTION, SUSPENSION INTRA-ARTICULAR; INTRAMUSCULAR
Status: COMPLETED | OUTPATIENT
Start: 2022-02-24 | End: 2022-02-24

## 2022-02-24 RX ORDER — LIDOCAINE HYDROCHLORIDE 10 MG/ML
4 INJECTION, SOLUTION INFILTRATION; PERINEURAL
Status: COMPLETED | OUTPATIENT
Start: 2022-02-24 | End: 2022-02-24

## 2022-02-24 RX ADMIN — LIDOCAINE HYDROCHLORIDE 4 ML: 10 INJECTION, SOLUTION INFILTRATION; PERINEURAL at 12:24

## 2022-02-24 RX ADMIN — TRIAMCINOLONE ACETONIDE 40 MG: 40 INJECTION, SUSPENSION INTRA-ARTICULAR; INTRAMUSCULAR at 12:24

## 2022-02-24 NOTE — PROGRESS NOTES
[facility] [place] [address]  [facility] [address] [phone]      Chief Complaint:  Chief Complaint   Patient presents with    Right Hip - Follow-up    Right Knee - Follow-up       Vitals:  /74 (BP Location: Left arm, Patient Position: Sitting, Cuff Size: Standard)   Pulse 93   Ht 5' 7" (1 702 m)   Wt 84 4 kg (186 lb)   BMI 29 13 kg/m²     The following portions of the patient's history were reviewed and updated as appropriate: allergies, current medications, past family history, past medical history, past social history, past surgical history, and problem list       Subjective:   Patient ID: Cristina Contreras is a 42 y o  female  Here for R hip US guided steroid inj      Review of Systems   Constitutional: Negative for fatigue and fever  HENT: Positive for congestion  Respiratory: Negative for cough and shortness of breath  Cardiovascular: Negative for chest pain  Gastrointestinal: Negative for abdominal pain and nausea  Genitourinary: Negative for dysuria  Musculoskeletal: Positive for arthralgias  Skin: Negative for rash and wound  Neurological: Negative for weakness and headaches  Objective:  Ortho Exam      Physical Exam  Constitutional:       Appearance: Normal appearance  She is normal weight  HENT:      Head: Normocephalic  Eyes:      Extraocular Movements: Extraocular movements intact  Pulmonary:      Effort: Pulmonary effort is normal    Musculoskeletal:      Cervical back: Normal range of motion  Skin:     General: Skin is warm and dry  Neurological:      General: No focal deficit present  Mental Status: She is alert and oriented to person, place, and time  Mental status is at baseline  Psychiatric:         Mood and Affect: Mood normal          Behavior: Behavior normal          Thought Content:  Thought content normal          Judgment: Judgment normal          Large joint arthrocentesis  Universal Protocol:  Consent: Verbal consent obtained  Risks and benefits: risks, benefits and alternatives were discussed  Consent given by: patient  Timeout called at: 2/24/2022 12:22 PM   Supporting Documentation  Indications: pain   Procedure Details  Preparation: Patient was prepped and draped in the usual sterile fashion  Needle size: 22 G  Ultrasound guidance: yes  Medications administered: 4 mL lidocaine 1 %; 40 mg triamcinolone acetonide 40 mg/mL    Patient tolerance: patient tolerated the procedure well with no immediate complications  Dressing:  Sterile dressing applied            Assessment/Plan:  Assessment/Plan   Diagnoses and all orders for this visit:    Primary osteoarthritis of one hip, right    Chronic hip pain, right    Other orders  -     Large joint arthrocentesis        Return if symptoms worsen or fail to improve       Kemi Gupta MD

## 2022-03-09 ENCOUNTER — OFFICE VISIT (OUTPATIENT)
Dept: BARIATRICS | Facility: CLINIC | Age: 42
End: 2022-03-09
Payer: COMMERCIAL

## 2022-03-09 VITALS
WEIGHT: 184.5 LBS | TEMPERATURE: 98.9 F | BODY MASS INDEX: 28.96 KG/M2 | HEART RATE: 70 BPM | HEIGHT: 67 IN | DIASTOLIC BLOOD PRESSURE: 74 MMHG | SYSTOLIC BLOOD PRESSURE: 112 MMHG

## 2022-03-09 DIAGNOSIS — K91.2 POSTSURGICAL MALABSORPTION: ICD-10-CM

## 2022-03-09 DIAGNOSIS — M16.0 PRIMARY OSTEOARTHRITIS OF BOTH HIPS: ICD-10-CM

## 2022-03-09 DIAGNOSIS — Z48.815 ENCOUNTER FOR SURGICAL AFTERCARE FOLLOWING SURGERY OF DIGESTIVE SYSTEM: Primary | ICD-10-CM

## 2022-03-09 DIAGNOSIS — E66.3 OVERWEIGHT: ICD-10-CM

## 2022-03-09 DIAGNOSIS — Z98.84 BARIATRIC SURGERY STATUS: ICD-10-CM

## 2022-03-09 PROCEDURE — 99213 OFFICE O/P EST LOW 20 MIN: CPT | Performed by: PHYSICIAN ASSISTANT

## 2022-03-09 NOTE — PROGRESS NOTES
Assessment/Plan:     Patient ID: Teri Faye is a 42 y o  female  Bariatric Surgery Status      -s/p Vertical Reg Deshpande 3/2/2021  Presents to the office today for annual  Doing well      · Continued/Maintain healthy weight loss with good nutrition intakes  · Adequate hydration with at least 64oz  fluid intake  · Follow diet as discussed  · Follow vitamin and mineral recommendations as reviewed with you  · Exercise as tolerated  · Colonoscopy referral made: not in age range   · Mammogram: utd     · Follow-up in 6 months  We kindly ask that your arrive 15 minutes before your scheduled appointment time with your provider to allow our staff to room you, get your vital signs and update your chart  · Get lab work done  Please call the office if you need a script  It is recommended to check with your insurance BEFORE getting labs done to make sure they are covered by your policy  · Call our office if you have any problems with abdominal pain especially associated with fever, chills, nausea, vomiting or any other concerns  · All  Post-bariatric surgery patients should be aware that very small quantities of any alcohol can cause impairment and it is very possible not to feel the effect  The effect can be in the system for several hours  It is also a stomach irritant  · It is advised to AVOID alcohol, Nonsteroidal antiinflammatory drugs (NSAIDS) and nicotine of all forms   Any of these can cause stomach irritation/pain  · Discussed the effects of alcohol on a bariatric patient and the increased impairment risk  · Keep up the good work!      Postsurgical Malabsorption   -At risk for malabsorption of vitamins/minerals secondary to malabsorption and restriction of intake from bariatric surgery  -Currently taking adequate postop bariatric surgery vitamin supplementation  -Last set of bariatric labs completed 8/2021 and wnl   -Next set of bariatric labs ordered for approximately 2 weeks  -Patient received education about the importance of adhering to a lifelong supplementation regimen to avoid vitamin/mineral deficiencies      Diagnoses and all orders for this visit:    Encounter for surgical aftercare following surgery of digestive system  -     Zinc; Future  -     Vitamin D 25 hydroxy; Future  -     Vitamin B12; Future  -     Vitamin B1, whole blood; Future  -     Vitamin A; Future  -     PTH, intact; Future  -     CBC and Platelet; Future  -     Comprehensive metabolic panel; Future  -     Ferritin; Future  -     Folate; Future  -     Iron Saturation %; Future    Bariatric surgery status  -     Zinc; Future  -     Vitamin D 25 hydroxy; Future  -     Vitamin B12; Future  -     Vitamin B1, whole blood; Future  -     Vitamin A; Future  -     PTH, intact; Future  -     CBC and Platelet; Future  -     Comprehensive metabolic panel; Future  -     Ferritin; Future  -     Folate; Future  -     Iron Saturation %; Future    Postsurgical malabsorption  -     Zinc; Future  -     Vitamin D 25 hydroxy; Future  -     Vitamin B12; Future  -     Vitamin B1, whole blood; Future  -     Vitamin A; Future  -     PTH, intact; Future  -     CBC and Platelet; Future  -     Comprehensive metabolic panel; Future  -     Ferritin; Future  -     Folate; Future  -     Iron Saturation %; Future    Primary osteoarthritis of both hips  -     Zinc; Future  -     Vitamin D 25 hydroxy; Future  -     Vitamin B12; Future  -     Vitamin B1, whole blood; Future  -     Vitamin A; Future  -     PTH, intact; Future  -     CBC and Platelet; Future  -     Comprehensive metabolic panel; Future  -     Ferritin; Future  -     Folate; Future  -     Iron Saturation %; Future    Overweight  -     Zinc; Future  -     Vitamin D 25 hydroxy; Future  -     Vitamin B12; Future  -     Vitamin B1, whole blood; Future  -     Vitamin A; Future  -     PTH, intact; Future  -     CBC and Platelet;  Future  -     Comprehensive metabolic panel; Future  -     Ferritin; Future  -     Folate; Future  -     Iron Saturation %; Future         Subjective:      Patient ID: Lavinia Boateng is a 42 y o  female     -s/p Vertical Alejandra Perdomo 3/2/2021  Presents to the office today for annual  Doing well      Cassia Walker  Current:184 5  EWL: (Weight loss is ahead of schedule at this post surgical period )  Rohith: current  Current BMI is Body mass index is 28 9 kg/m²  · Tolerating a regular diet-yes  · Eating at least 60 grams of protein per day-yes  · Following 30/60 minute rule with liquids-yes  · Drinking at least 64 ounces of fluid per day-yes  · Drinking carbonated beverages-no  · Sufficient exercise-limited due to chronic hip pain - has appt with surgeon to discuss right hip placement   · Using NSAIDs regularly-no  · Using nicotine-no  · Using alcohol-occasional   · Supplements: antonia fusion with calcium     · EWL is 74%, which places the patient ahead of schedule for expected post surgical weight loss at this time  The following portions of the patient's history were reviewed and updated as appropriate: allergies, current medications, past family history, past medical history, past social history, past surgical history and problem list     Review of Systems   Constitutional: Negative  Respiratory: Negative  Cardiovascular: Negative  Gastrointestinal: Negative  Musculoskeletal: Positive for arthralgias (hip pain at baseline )  Skin: Negative  Neurological: Negative  Psychiatric/Behavioral: Negative  Objective:    /74 (BP Location: Left arm, Patient Position: Sitting, Cuff Size: Standard)   Pulse 70   Temp 98 9 °F (37 2 °C) (Tympanic)   Ht 5' 7" (1 702 m)   Wt 83 7 kg (184 lb 8 oz)   BMI 28 90 kg/m²      Physical Exam  Vitals and nursing note reviewed  Constitutional:       Appearance: Normal appearance  She is obese  HENT:      Head: Normocephalic and atraumatic     Eyes: Extraocular Movements: Extraocular movements intact  Pupils: Pupils are equal, round, and reactive to light  Cardiovascular:      Rate and Rhythm: Normal rate and regular rhythm  Pulmonary:      Effort: Pulmonary effort is normal       Breath sounds: Normal breath sounds  Abdominal:      General: Bowel sounds are normal       Tenderness: There is no abdominal tenderness  Musculoskeletal:         General: Normal range of motion  Cervical back: Normal range of motion  Skin:     General: Skin is warm and dry  Neurological:      General: No focal deficit present  Mental Status: She is alert and oriented to person, place, and time     Psychiatric:         Mood and Affect: Mood normal

## 2022-03-22 ENCOUNTER — OFFICE VISIT (OUTPATIENT)
Dept: OBGYN CLINIC | Facility: CLINIC | Age: 42
End: 2022-03-22
Payer: COMMERCIAL

## 2022-03-22 ENCOUNTER — PREP FOR PROCEDURE (OUTPATIENT)
Dept: OBGYN CLINIC | Facility: CLINIC | Age: 42
End: 2022-03-22

## 2022-03-22 VITALS
WEIGHT: 188.8 LBS | SYSTOLIC BLOOD PRESSURE: 118 MMHG | DIASTOLIC BLOOD PRESSURE: 74 MMHG | HEIGHT: 67 IN | BODY MASS INDEX: 29.63 KG/M2

## 2022-03-22 DIAGNOSIS — G89.29 CHRONIC HIP PAIN, RIGHT: ICD-10-CM

## 2022-03-22 DIAGNOSIS — Z01.818 PRE-OP TESTING: ICD-10-CM

## 2022-03-22 DIAGNOSIS — M16.11 PRIMARY OSTEOARTHRITIS OF ONE HIP, RIGHT: Primary | ICD-10-CM

## 2022-03-22 DIAGNOSIS — M25.551 CHRONIC HIP PAIN, RIGHT: ICD-10-CM

## 2022-03-22 PROCEDURE — 3008F BODY MASS INDEX DOCD: CPT | Performed by: ORTHOPAEDIC SURGERY

## 2022-03-22 PROCEDURE — 1036F TOBACCO NON-USER: CPT | Performed by: ORTHOPAEDIC SURGERY

## 2022-03-22 PROCEDURE — 99214 OFFICE O/P EST MOD 30 MIN: CPT | Performed by: ORTHOPAEDIC SURGERY

## 2022-03-22 RX ORDER — FOLIC ACID 1 MG/1
1 TABLET ORAL DAILY
Qty: 30 TABLET | Refills: 0 | Status: SHIPPED | OUTPATIENT
Start: 2022-03-22 | End: 2022-04-01 | Stop reason: SDUPTHER

## 2022-03-22 RX ORDER — MULTIVIT-MIN/IRON FUM/FOLIC AC 7.5 MG-4
1 TABLET ORAL DAILY
Qty: 30 TABLET | Refills: 0 | Status: SHIPPED | OUTPATIENT
Start: 2022-03-22

## 2022-03-22 RX ORDER — FERROUS SULFATE TAB EC 324 MG (65 MG FE EQUIVALENT) 324 (65 FE) MG
324 TABLET DELAYED RESPONSE ORAL
Qty: 30 TABLET | Refills: 0 | Status: SHIPPED | OUTPATIENT
Start: 2022-03-22 | End: 2022-04-11

## 2022-03-22 RX ORDER — CHLORHEXIDINE GLUCONATE 0.12 MG/ML
15 RINSE ORAL ONCE
Status: CANCELLED | OUTPATIENT
Start: 2022-03-22 | End: 2022-03-22

## 2022-03-22 RX ORDER — ASCORBIC ACID 500 MG
500 TABLET ORAL 2 TIMES DAILY
Qty: 60 TABLET | Refills: 0 | Status: SHIPPED | OUTPATIENT
Start: 2022-03-22

## 2022-03-22 NOTE — PROGRESS NOTES
Assessment:   Diagnosis ICD-10-CM Associated Orders   1  Primary osteoarthritis of one hip, right  M16 11    2  Chronic hip pain, right  M25 551     G89 29        Plan:    - Xrays of the right hip were reviewed with the patient noting that she had arthritic change due to developmental Dysplasia of hip  - The benefits and purpose of the operation and or procedure have been explained to me in language I understand by Dr Narayan Tavares as well the risks, alternatives and complications pertaining to the above procedure/surgery which include but is not limited to : infections, deep vein thrombosis, blood clots to the lungs, wound healing problems, complications, for extensive blood loss, including shock, possible death, leg length discrepancy, limp hip dislocations, fracture, loss of limb, persistent pain, failure of hardware/implant, damage of blood vessels and or nerves, heart attack, stroke and potential need for further surgery/revision surgery  - patient will be scheduled for right anterior total hip arthroplasty and associated procedures  - Patient has no hx of DVT or PE  She can take the Aspirin 81mg BID  To do next visit:  Return for Follow up post- op  The above stated was discussed in layman's terms and the patient expressed understanding  All questions were answered to the patient's satisfaction  Scribe Attestation    I,:  Yuli Herndon am acting as a scribe while in the presence of the attending physician :       I,:  Valentin Bustos MD personally performed the services described in this documentation    as scribed in my presence :             Subjective:   Salena Rodriguez is a 42 y o  female who presents today for an initial evaluation for her right hip pain due to osteoarthritis  She noticed increase pain around November 2021  Patient has treated with an intra-articular hip injection, 2/24/2022  She has a hx of a left anterior total hip arthroplasty, 9 years ago by Dr Rosi Painter     She states her PCP, Dr Maryam Thomas said right leg was shorter than the other, noted on a scanogram  She has tried wearing a shoe lift that seemed to bother the hip more  She never felt that the right leg was shorter than the left  She has hx of gastric sleeve and refrains from taking NSAIDs  She does take Tylenol arthritis as needed for pain and discomfort  Review of systems negative unless otherwise specified in HPI  Review of Systems   Constitutional: Negative for chills, fever and unexpected weight change  HENT: Negative for hearing loss, nosebleeds and sore throat  Eyes: Negative for pain, redness and visual disturbance  Respiratory: Negative for cough, shortness of breath and wheezing  Cardiovascular: Negative for chest pain, palpitations and leg swelling  Gastrointestinal: Negative for abdominal pain and nausea  Genitourinary: Negative for dyspareunia, dysuria and frequency  Skin: Negative for rash and wound  Neurological: Negative for dizziness, numbness and headaches  Psychiatric/Behavioral: Negative for decreased concentration and suicidal ideas  The patient is not nervous/anxious          Past Medical History:   Diagnosis Date    Arthritis     Right hip    Asthma     allergy induced    Environmental allergies     dust    GERD (gastroesophageal reflux disease)     Migraine with aura     Osteoarthritis     Seasonal allergies     Wears contact lenses     wears at night       Past Surgical History:   Procedure Laterality Date    ANKLE SURGERY Left     ANKLE STABALIZATION    CHOLECYSTECTOMY  2015    COLONOSCOPY      JOINT REPLACEMENT  March 2013    Total left hip replacement    AR LAP, DOMINGO RESTRICT PROC, LONGITUDINAL GASTRECTOMY N/A 3/2/2021    Procedure: ROBOTIC SLEEVE GASTRECTOMY; ROBOTIC PARAESOPHAGEAL HERNIA REPAIR;  Surgeon: Peggy Granger MD;  Location: [facility];  Service: Bariatrics    TOOTH EXTRACTION      TOTAL HIP ARTHROPLASTY Left        Family History   Problem Relation Age of Onset    Arthritis Mother     Hypertension Mother     Skin cancer Mother     Diabetes Father         MELLITUS    Hypertension Father     Anemia Sister     Celiac disease Sister     Thyroid disease Sister     Varicose Veins Sister         LOWER EXTREMITIES    Breast cancer Sister 40    Cancer Sister         Right breast cancer    Liver cancer Maternal Grandmother [de-identified]    Diabetes Maternal Grandmother     Cancer Maternal Grandmother         Liver cancer    Diabetes Paternal Grandmother         MELLIYUS    Dementia Paternal Grandmother     Diabetes Paternal Grandfather         MELLITUS    Stroke Paternal Grandfather     No Known Problems Maternal Grandfather     Stroke Paternal Uncle     Heart disease Neg Hx        Social History     Occupational History     Employer: [employer]   Tobacco Use    Smoking status: Never Smoker    Smokeless tobacco: Never Used   Vaping Use    Vaping Use: Never used   Substance and Sexual Activity    Alcohol use: Not Currently     Comment: Socially    Drug use: No    Sexual activity: Not Currently     Partners: Male     Birth control/protection: Abstinence, Ring         Current Outpatient Medications:     Cetirizine HCl (ZYRTEC PO), Take 10 mg by mouth daily , Disp: , Rfl:     etonogestrel-ethinyl estradiol (NuvaRing) 0 12-0 015 MG/24HR vaginal ring, Insert 1 each into the vagina every 28 days, Disp: 4 each, Rfl: 3    fluocinolone (SYNALAR) 0 025 % ointment, daily at bedtime Around the mouth, Disp: , Rfl: 2    multivitamin (THERAGRAN) TABS, Take 1 tablet by mouth daily Bariatric fusion, Disp: , Rfl:     methylPREDNISolone 4 MG tablet therapy pack, Use as directed on package, Disp: 21 each, Rfl: 0    methylPREDNISolone 4 MG tablet therapy pack, Use as directed on package, Disp: 21 each, Rfl: 0    No Known Allergies         Vitals:    03/22/22 0827   BP: 118/74       Objective:                    Right Hip Exam     Tenderness   Right hip tenderness location: groin pain  Range of Motion   Flexion: 100   External rotation: 20   Internal rotation: 15     Muscle Strength   The patient has normal right hip strength  Tests   ASHUTOSH: negative    Other   Erythema: absent  Sensation: normal  Pulse: present    Comments:  Calf is soft and non tender            Diagnostics, reviewed and taken today if performed as documented:    None performed      The attending physician has personally reviewed the pertinent films in PACS and interpretation is as follows:  X-rays right hip/pelvis three views:  Arthritic change noted in the right hip due to developmental dysplasia of the hip  Procedures, if performed today:    Procedures    None performed      Portions of the record may have been created with voice recognition software  Occasional wrong word or "sound a like" substitutions may have occurred due to the inherent limitations of voice recognition software  Read the chart carefully and recognize, using context, where substitutions have occurred

## 2022-03-29 PROBLEM — M16.11 PRIMARY OSTEOARTHRITIS OF RIGHT HIP: Status: ACTIVE | Noted: 2019-07-31

## 2022-03-29 PROBLEM — Z90.3 H/O GASTRIC SLEEVE: Status: ACTIVE | Noted: 2022-03-29

## 2022-03-29 NOTE — H&P (VIEW-ONLY)
Internal Medicine Pre-Operative Evaluation:     Reason for Visit: Pre-operative Evaluation for Risk Stratification and Optimization    Patient ID: Jannie Flores is a 42 y o  female  Surgery: Arthroplasty of right Hip  Referring Provider: Dr Chasity Green      Primary osteoarthritis right Hip   Failed conservative measures   Electing to undergo total joint arthroplasty    Pre-operative Optimization for planned surgery   Patient is surgically optimized for planned procedure   Patient meets preoperative quality goals as noted below   Recommendations as listed in PLAN section below  Fer Scott 0971 surgical nurse  navigator with any questions regarding preoperative plan or schedule   Follow up visit with [facility] medical team 1 week after discharge from surgery as scheduled      Primary osteoarthritis of right hip  Failed outpatient conservative measures  Electing to undergo arthroplasty    H/O gastric sleeve  Avoid NSAIDS  Cont small frequent meals  Cont MVI      Patient Active Problem List   Diagnosis    Primary osteoarthritis of right hip    Migraine with aura    Asthma    GERD (gastroesophageal reflux disease)    H/O gastric sleeve        Plan:     1  Further preoperative workup as follows:   - none no further testing required may proceed with surgery    2  Medication Management/Recommendations:   - continue all current medicines through morning of surgery with sip of water except the following:  - hold aspirin 7 days prior to surgery  - avoid use of NSAID such as motrin, advil, aleve for 7 days prior to surgery  - hold all OTC herbal or nutritional supplements 7 days before surgery    3  Patient requires further consultation with:   No Consults Required    4   Discharge Planning / Barriers to Discharge  none identified - patients has post discharge therapy plan in place, transportation arranged for discharge day, adequate family support at home to assist with discharge to home     Recommendations to Proceed withSurgery    No contraindications to planned surgery      Subjective:           History of Present Illness:     Satish Alejandre is a 42 y o  female who presents to the office today for a preoperative consultation at the request of surgeon  The patient understands this is an elective procedure and not emergent  They are electing to undergo planned procedure with an understanding that all surgery has inherent risk  They have worked with their surgeon and failed conservative treatment measures  Today they present for preoperative risk assessment and recommendations for optimization in preparation for surgery  Pt seen in surgical optimization center for upcoming proposed surgery  They have failed previous conservative measures and have elected surgical intervention  Pt meets presurgical lab and BMI optimization goals  Upon interview questioning patient is able to perform greater than 4 METs workload in daily life without any reported cardio-pulmonary symptoms  ROS: No TIA's or unusual headaches, no dysphagia  No prolonged cough  No dyspnea or chest pain on exertion  No abdominal pain, change in bowel habits, black or bloody stools  No urinary tract or BPH symptoms  Positive reported pain in arthritic joint  Positive difficulty with gait  No skin rashes or issues              Objective:      /74   Pulse 84   Ht 5' 7" (1 702 m)   Wt 86 4 kg (190 lb 6 4 oz)   SpO2 99%   BMI 29 82 kg/m²       General Appearance: no distress, conversive  HEENT: PERRLA, conjuctiva normal; oropharynx clear; mucous membranes moist;   Neck:  Supple, no lymphadenopathy or thyromegaly  Lungs: CTA, normal respiratory effort, no retractions, expiratory effort normal  CV: regular rate and rhythm , PMI normal   ABD: soft non tender, no masses , no hepatic or splenomegaly  EXT: DP pulses intact, no lymphadenopathy, no edema  Skin: normal turgor, normal texture, no rash  Psych: affect normal, mood normal  Neuro: AAOx3        The following portions of the patient's history were reviewed and updated as appropriate: allergies, current medications, past family history, past medical history, past social history, past surgical history and problem list      Past History:       Past Medical History:   Diagnosis Date    Arthritis     Right hip    Asthma     allergy induced    Environmental allergies     dust    GERD (gastroesophageal reflux disease)     Migraine with aura     Osteoarthritis     Seasonal allergies     Wears contact lenses     wears at night    Past Surgical History:   Procedure Laterality Date    ANKLE SURGERY Left     ANKLE STABALIZATION    CHOLECYSTECTOMY  2015    COLONOSCOPY      JOINT REPLACEMENT  March 2013    Total left hip replacement    TN LAP, DOMINGO RESTRICT PROC, LONGITUDINAL GASTRECTOMY N/A 3/2/2021    Procedure: ROBOTIC SLEEVE GASTRECTOMY; ROBOTIC PARAESOPHAGEAL HERNIA REPAIR;  Surgeon: Juaquin Chandler MD;  Location: [facility];  Service: Bariatrics    TOOTH EXTRACTION      TOTAL HIP ARTHROPLASTY Left           Social History     Tobacco Use    Smoking status: Never Smoker    Smokeless tobacco: Never Used   Vaping Use    Vaping Use: Never used   Substance Use Topics    Alcohol use: Not Currently     Comment: Socially    Drug use: No     Family History   Problem Relation Age of Onset    Arthritis Mother     Hypertension Mother     Skin cancer Mother     Diabetes Father         MELLITUS    Hypertension Father     Anemia Sister     Celiac disease Sister     Thyroid disease Sister     Varicose Veins Sister         LOWER EXTREMITIES    Breast cancer Sister 40    Cancer Sister         Right breast cancer    Liver cancer Maternal Grandmother [de-identified]    Diabetes Maternal Grandmother     Cancer Maternal Grandmother         Liver cancer    Diabetes Paternal Grandmother         MELLIYUS    Dementia Paternal Grandmother     Diabetes Paternal Grandfather         MELLITUS    Stroke Paternal Grandfather     No Known Problems Maternal Grandfather     Stroke Paternal Uncle     Heart disease Neg Hx           Allergies:     No Known Allergies     Current Medications:     Current Outpatient Medications   Medication Instructions    ascorbic acid (VITAMIN C) 500 mg, Oral, 2 times daily    Cetirizine HCl (ZYRTEC PO) 10 mg, Oral, Daily    etonogestrel-ethinyl estradiol (NuvaRing) 0 12-0 015 MG/24HR vaginal ring 1 each, Vaginal, Every 28 days    ferrous sulfate 324 mg, Oral, 2 times daily before meals    fluocinolone (SYNALAR) 0 025 % ointment Daily at bedtime, Around the mouth    folic acid (FOLVITE) 1 mg, Oral, Daily    Multiple Vitamins-Minerals (multivitamin with minerals) tablet 1 tablet, Oral, Daily    multivitamin (THERAGRAN) TABS 1 tablet, Oral, Daily, Bariatric fusion             PRE-OP WORKSHEET DATA    Assessment of Pre-Operative Risks     MLJ Quality Hard Stops:  BMI (<40) : Estimated body mass index is 29 82 kg/m² as calculated from the following:    Height as of this encounter: 5' 7" (1 702 m)  Weight as of this encounter: 86 4 kg (190 lb 6 4 oz)  Hgb ( >11): Lab Results   Component Value Date    HGB 13 1 03/30/2022     HbA1c (<7 0) :   Lab Results   Component Value Date    HGBA1C 5 5 03/30/2022     GFR (>60) (Less then 45 = Nephrology consult):  Estimated Creatinine Clearance: 98 5 mL/min (by C-G formula based on SCr of 0 84 mg/dL)        Active Decompensated Chronic Conditions which would delay surgery  No acutely decompensated medical issues such as recent CVA, MI, new onset arrhythmia, severe aortic stenosis, CHF, uncontrolled COPD       Exercise Capacity: (if less the 4 mets consider functional assessment via cardiac stress testing or consultation)    · Able to walk 2 blocks without symptoms?: Yes  · Able to walk 1 flights without symptoms?: Yes    Assessment of intra and post operative respiratory, hemodynamic and thrombotic risks     Prior Anesthesia Reactions: No     Personal history of venous thromboembolic disease? No    History of steroid use > 5 mg for >2 weeks within last year? No    Cardiac Risk Estimation: per the Revised Cardiac Risk Index (Circ  [id number], 1999),     The patient's risk factors for cardiac complications include :  none    Mounika Montaño has a in hospital cardiac risk of RCI RISK CLASS I (0 risk factors, risk of major cardiac compl  appr  0 5%) based on RCRI calculator          Pre-Op Data Reviewed:       Laboratory Results: I have personally reviewed the pertinent laboratory results/reports     EKG:I have personally reviewed pertinent reports    I personally reviewed and interpreted available tracings in the electronic medical record    OLD RECORDS: reviewed old records in the chart review section if EHR on day of visit      Previous cardiopulmonary studies within the past year:  · Echocardiogram: no  · Cardiac Catheterization: no  · Stress Test: no      Time of visit including pre-visit chart review, visit and post-visit coordination of plan and care , review of pre-surgical lab work, preparation and time spent documenting note in electronic medical record, time spent face-to-face in physical examination answering patient questions: 60 minutes       Marissa Flores, [address]

## 2022-03-29 NOTE — PROGRESS NOTES
Internal Medicine Pre-Operative Evaluation:     Reason for Visit: Pre-operative Evaluation for Risk Stratification and Optimization    Patient ID: Andre Crawford is a 42 y o  female  Surgery: Arthroplasty of right Hip  Referring Provider: Dr Shala Pak      Primary osteoarthritis right Hip   Failed conservative measures   Electing to undergo total joint arthroplasty    Pre-operative Optimization for planned surgery   Patient is surgically optimized for planned procedure   Patient meets preoperative quality goals as noted below   Recommendations as listed in PLAN section below  Fer Scott 0570 surgical nurse  navigator with any questions regarding preoperative plan or schedule   Follow up visit with [facility] medical team 1 week after discharge from surgery as scheduled      Primary osteoarthritis of right hip  Failed outpatient conservative measures  Electing to undergo arthroplasty    H/O gastric sleeve  Avoid NSAIDS  Cont small frequent meals  Cont MVI      Patient Active Problem List   Diagnosis    Primary osteoarthritis of right hip    Migraine with aura    Asthma    GERD (gastroesophageal reflux disease)    H/O gastric sleeve        Plan:     1  Further preoperative workup as follows:   - none no further testing required may proceed with surgery    2  Medication Management/Recommendations:   - continue all current medicines through morning of surgery with sip of water except the following:  - hold aspirin 7 days prior to surgery  - avoid use of NSAID such as motrin, advil, aleve for 7 days prior to surgery  - hold all OTC herbal or nutritional supplements 7 days before surgery    3  Patient requires further consultation with:   No Consults Required    4   Discharge Planning / Barriers to Discharge  none identified - patients has post discharge therapy plan in place, transportation arranged for discharge day, adequate family support at home to assist with discharge to home     Recommendations to Proceed withSurgery    No contraindications to planned surgery      Subjective:           History of Present Illness:     Althea Rushing is a 42 y o  female who presents to the office today for a preoperative consultation at the request of surgeon  The patient understands this is an elective procedure and not emergent  They are electing to undergo planned procedure with an understanding that all surgery has inherent risk  They have worked with their surgeon and failed conservative treatment measures  Today they present for preoperative risk assessment and recommendations for optimization in preparation for surgery  Pt seen in surgical optimization center for upcoming proposed surgery  They have failed previous conservative measures and have elected surgical intervention  Pt meets presurgical lab and BMI optimization goals  Upon interview questioning patient is able to perform greater than 4 METs workload in daily life without any reported cardio-pulmonary symptoms  ROS: No TIA's or unusual headaches, no dysphagia  No prolonged cough  No dyspnea or chest pain on exertion  No abdominal pain, change in bowel habits, black or bloody stools  No urinary tract or BPH symptoms  Positive reported pain in arthritic joint  Positive difficulty with gait  No skin rashes or issues              Objective:      /74   Pulse 84   Ht 5' 7" (1 702 m)   Wt 86 4 kg (190 lb 6 4 oz)   SpO2 99%   BMI 29 82 kg/m²       General Appearance: no distress, conversive  HEENT: PERRLA, conjuctiva normal; oropharynx clear; mucous membranes moist;   Neck:  Supple, no lymphadenopathy or thyromegaly  Lungs: CTA, normal respiratory effort, no retractions, expiratory effort normal  CV: regular rate and rhythm , PMI normal   ABD: soft non tender, no masses , no hepatic or splenomegaly  EXT: DP pulses intact, no lymphadenopathy, no edema  Skin: normal turgor, normal texture, no rash  Psych: affect normal, mood normal  Neuro: AAOx3        The following portions of the patient's history were reviewed and updated as appropriate: allergies, current medications, past family history, past medical history, past social history, past surgical history and problem list      Past History:       Past Medical History:   Diagnosis Date    Arthritis     Right hip    Asthma     allergy induced    Environmental allergies     dust    GERD (gastroesophageal reflux disease)     Migraine with aura     Osteoarthritis     Seasonal allergies     Wears contact lenses     wears at night    Past Surgical History:   Procedure Laterality Date    ANKLE SURGERY Left     ANKLE STABALIZATION    CHOLECYSTECTOMY  2015    COLONOSCOPY      JOINT REPLACEMENT  March 2013    Total left hip replacement    TX LAP, DOMINGO RESTRICT PROC, LONGITUDINAL GASTRECTOMY N/A 3/2/2021    Procedure: ROBOTIC SLEEVE GASTRECTOMY; ROBOTIC PARAESOPHAGEAL HERNIA REPAIR;  Surgeon: Meme Small MD;  Location: [facility];  Service: Bariatrics    TOOTH EXTRACTION      TOTAL HIP ARTHROPLASTY Left           Social History     Tobacco Use    Smoking status: Never Smoker    Smokeless tobacco: Never Used   Vaping Use    Vaping Use: Never used   Substance Use Topics    Alcohol use: Not Currently     Comment: Socially    Drug use: No     Family History   Problem Relation Age of Onset    Arthritis Mother     Hypertension Mother     Skin cancer Mother     Diabetes Father         MELLITUS    Hypertension Father     Anemia Sister     Celiac disease Sister     Thyroid disease Sister     Varicose Veins Sister         LOWER EXTREMITIES    Breast cancer Sister 40    Cancer Sister         Right breast cancer    Liver cancer Maternal Grandmother [de-identified]    Diabetes Maternal Grandmother     Cancer Maternal Grandmother         Liver cancer    Diabetes Paternal Grandmother         MELLIYUS    Dementia Paternal Grandmother     Diabetes Paternal Grandfather         MELLITUS    Stroke Paternal Grandfather     No Known Problems Maternal Grandfather     Stroke Paternal Uncle     Heart disease Neg Hx           Allergies:     No Known Allergies     Current Medications:     Current Outpatient Medications   Medication Instructions    ascorbic acid (VITAMIN C) 500 mg, Oral, 2 times daily    Cetirizine HCl (ZYRTEC PO) 10 mg, Oral, Daily    etonogestrel-ethinyl estradiol (NuvaRing) 0 12-0 015 MG/24HR vaginal ring 1 each, Vaginal, Every 28 days    ferrous sulfate 324 mg, Oral, 2 times daily before meals    fluocinolone (SYNALAR) 0 025 % ointment Daily at bedtime, Around the mouth    folic acid (FOLVITE) 1 mg, Oral, Daily    Multiple Vitamins-Minerals (multivitamin with minerals) tablet 1 tablet, Oral, Daily    multivitamin (THERAGRAN) TABS 1 tablet, Oral, Daily, Bariatric fusion             PRE-OP WORKSHEET DATA    Assessment of Pre-Operative Risks     MLJ Quality Hard Stops:  BMI (<40) : Estimated body mass index is 29 82 kg/m² as calculated from the following:    Height as of this encounter: 5' 7" (1 702 m)  Weight as of this encounter: 86 4 kg (190 lb 6 4 oz)  Hgb ( >11): Lab Results   Component Value Date    HGB 13 1 03/30/2022     HbA1c (<7 0) :   Lab Results   Component Value Date    HGBA1C 5 5 03/30/2022     GFR (>60) (Less then 45 = Nephrology consult):  Estimated Creatinine Clearance: 98 5 mL/min (by C-G formula based on SCr of 0 84 mg/dL)        Active Decompensated Chronic Conditions which would delay surgery  No acutely decompensated medical issues such as recent CVA, MI, new onset arrhythmia, severe aortic stenosis, CHF, uncontrolled COPD       Exercise Capacity: (if less the 4 mets consider functional assessment via cardiac stress testing or consultation)    · Able to walk 2 blocks without symptoms?: Yes  · Able to walk 1 flights without symptoms?: Yes    Assessment of intra and post operative respiratory, hemodynamic and thrombotic risks     Prior Anesthesia Reactions: No     Personal history of venous thromboembolic disease? No    History of steroid use > 5 mg for >2 weeks within last year? No    Cardiac Risk Estimation: per the Revised Cardiac Risk Index (Circ  100:[id number], 1999),     The patient's risk factors for cardiac complications include :  none    Ya Torres has a in hospital cardiac risk of RCI RISK CLASS I (0 risk factors, risk of major cardiac compl  appr  0 5%) based on RCRI calculator          Pre-Op Data Reviewed:       Laboratory Results: I have personally reviewed the pertinent laboratory results/reports     EKG:I have personally reviewed pertinent reports    I personally reviewed and interpreted available tracings in the electronic medical record    OLD RECORDS: reviewed old records in the chart review section if EHR on day of visit      Previous cardiopulmonary studies within the past year:  · Echocardiogram: no  · Cardiac Catheterization: no  · Stress Test: no      Time of visit including pre-visit chart review, visit and post-visit coordination of plan and care , review of pre-surgical lab work, preparation and time spent documenting note in electronic medical record, time spent face-to-face in physical examination answering patient questions: 60 minutes       Yao Romo, [address]

## 2022-03-29 NOTE — PATIENT INSTRUCTIONS
 Contact surgical nurse  navigator with any questions regarding preoperative plan or schedule   Follow up visit with [facility] medical team 1 week after discharge from surgery     Stop all over the counter supplements, herbal, naturopathic  medications for 1 week prior to surgery UNLESS prescribed by your surgeon   Hold NSAIDS (i e  advil, alleve, motrin, ibuprofen, celebrex) minimum 7 days prior to surgery   Hold Asprin minimum 7 days prior to surgery

## 2022-03-30 ENCOUNTER — TELEPHONE (OUTPATIENT)
Dept: OBGYN CLINIC | Facility: HOSPITAL | Age: 42
End: 2022-03-30

## 2022-03-30 ENCOUNTER — APPOINTMENT (OUTPATIENT)
Dept: LAB | Facility: HOSPITAL | Age: 42
End: 2022-03-30
Attending: ORTHOPAEDIC SURGERY
Payer: COMMERCIAL

## 2022-03-30 DIAGNOSIS — M25.551 CHRONIC HIP PAIN, RIGHT: ICD-10-CM

## 2022-03-30 DIAGNOSIS — G89.29 CHRONIC HIP PAIN, RIGHT: ICD-10-CM

## 2022-03-30 DIAGNOSIS — M16.11 PRIMARY OSTEOARTHRITIS OF ONE HIP, RIGHT: ICD-10-CM

## 2022-03-30 DIAGNOSIS — Z01.818 PRE-OP TESTING: ICD-10-CM

## 2022-03-30 LAB
ALBUMIN SERPL BCP-MCNC: 3.2 G/DL (ref 3.5–5)
ALP SERPL-CCNC: 41 U/L (ref 46–116)
ALT SERPL W P-5'-P-CCNC: 20 U/L (ref 12–78)
ANION GAP SERPL CALCULATED.3IONS-SCNC: 5 MMOL/L (ref 4–13)
APTT PPP: 28 SECONDS (ref 23–37)
AST SERPL W P-5'-P-CCNC: 17 U/L (ref 5–45)
BASOPHILS # BLD AUTO: 0.1 THOUSANDS/ΜL (ref 0–0.1)
BASOPHILS NFR BLD AUTO: 1 % (ref 0–1)
BILIRUB SERPL-MCNC: 0.21 MG/DL (ref 0.2–1)
BUN SERPL-MCNC: 23 MG/DL (ref 5–25)
CALCIUM ALBUM COR SERPL-MCNC: 9.8 MG/DL (ref 8.3–10.1)
CALCIUM SERPL-MCNC: 9.2 MG/DL (ref 8.3–10.1)
CHLORIDE SERPL-SCNC: 104 MMOL/L (ref 100–108)
CO2 SERPL-SCNC: 27 MMOL/L (ref 21–32)
CREAT SERPL-MCNC: 0.84 MG/DL (ref 0.6–1.3)
EOSINOPHIL # BLD AUTO: 0.11 THOUSAND/ΜL (ref 0–0.61)
EOSINOPHIL NFR BLD AUTO: 2 % (ref 0–6)
ERYTHROCYTE [DISTWIDTH] IN BLOOD BY AUTOMATED COUNT: 14.7 % (ref 11.6–15.1)
EST. AVERAGE GLUCOSE BLD GHB EST-MCNC: 111 MG/DL
GFR SERPL CREATININE-BSD FRML MDRD: 86 ML/MIN/1.73SQ M
GLUCOSE SERPL-MCNC: 83 MG/DL (ref 65–140)
HBA1C MFR BLD: 5.5 %
HCT VFR BLD AUTO: 40.1 % (ref 34.8–46.1)
HGB BLD-MCNC: 13.1 G/DL (ref 11.5–15.4)
IMM GRANULOCYTES # BLD AUTO: 0.03 THOUSAND/UL (ref 0–0.2)
IMM GRANULOCYTES NFR BLD AUTO: 0 % (ref 0–2)
INR PPP: 0.96 (ref 0.84–1.19)
LYMPHOCYTES # BLD AUTO: 2.17 THOUSANDS/ΜL (ref 0.6–4.47)
LYMPHOCYTES NFR BLD AUTO: 30 % (ref 14–44)
MCH RBC QN AUTO: 27.5 PG (ref 26.8–34.3)
MCHC RBC AUTO-ENTMCNC: 32.7 G/DL (ref 31.4–37.4)
MCV RBC AUTO: 84 FL (ref 82–98)
MONOCYTES # BLD AUTO: 0.64 THOUSAND/ΜL (ref 0.17–1.22)
MONOCYTES NFR BLD AUTO: 9 % (ref 4–12)
NEUTROPHILS # BLD AUTO: 4.14 THOUSANDS/ΜL (ref 1.85–7.62)
NEUTS SEG NFR BLD AUTO: 58 % (ref 43–75)
NRBC BLD AUTO-RTO: 0 /100 WBCS
PLATELET # BLD AUTO: 274 THOUSANDS/UL (ref 149–390)
PMV BLD AUTO: 10.2 FL (ref 8.9–12.7)
POTASSIUM SERPL-SCNC: 3.9 MMOL/L (ref 3.5–5.3)
PROT SERPL-MCNC: 6.8 G/DL (ref 6.4–8.2)
PROTHROMBIN TIME: 12.4 SECONDS (ref 11.6–14.5)
RBC # BLD AUTO: 4.77 MILLION/UL (ref 3.81–5.12)
SODIUM SERPL-SCNC: 136 MMOL/L (ref 136–145)
WBC # BLD AUTO: 7.19 THOUSAND/UL (ref 4.31–10.16)

## 2022-03-30 PROCEDURE — 85730 THROMBOPLASTIN TIME PARTIAL: CPT

## 2022-03-30 PROCEDURE — 80053 COMPREHEN METABOLIC PANEL: CPT

## 2022-03-30 PROCEDURE — 83036 HEMOGLOBIN GLYCOSYLATED A1C: CPT

## 2022-03-30 PROCEDURE — 85610 PROTHROMBIN TIME: CPT

## 2022-03-30 PROCEDURE — 85025 COMPLETE CBC W/AUTO DIFF WBC: CPT

## 2022-04-05 ENCOUNTER — EVALUATION (OUTPATIENT)
Dept: PHYSICAL THERAPY | Facility: REHABILITATION | Age: 42
End: 2022-04-05
Payer: COMMERCIAL

## 2022-04-05 DIAGNOSIS — M16.11 PRIMARY OSTEOARTHRITIS OF ONE HIP, RIGHT: ICD-10-CM

## 2022-04-05 DIAGNOSIS — M25.551 CHRONIC HIP PAIN, RIGHT: ICD-10-CM

## 2022-04-05 DIAGNOSIS — Z01.818 PRE-OP TESTING: ICD-10-CM

## 2022-04-05 DIAGNOSIS — G89.29 CHRONIC HIP PAIN, RIGHT: ICD-10-CM

## 2022-04-05 PROCEDURE — 97112 NEUROMUSCULAR REEDUCATION: CPT

## 2022-04-05 PROCEDURE — 97161 PT EVAL LOW COMPLEX 20 MIN: CPT

## 2022-04-05 NOTE — PROGRESS NOTES
PT Evaluation     Today's date: 2022  Patient name: Mounika Montaño  : 1980  MRN: [id number]  Referring provider: Mckenzie German MD  Dx:   Encounter Diagnosis     ICD-10-CM    1  Primary osteoarthritis of one hip, right  M16 11 Ambulatory referral to Physical Therapy   2  Chronic hip pain, right  M25 [id number] Ambulatory referral to Physical Therapy    G89 [id number]    3  Pre-op testing  Z01 [id number] Ambulatory referral to Physical Therapy                  Assessment  Assessment details: Mounika Montaño is a 42y o  year old female with a referred dx of chronic R hip pain, R hip OA, and pre-op testing  Pt presents with pain with functional activites such as sitting and standing for long periods, stair negotiation, and poor sleep tolerance on R side  Upon further clinical testing, pt demonstrates decreased RLE strength, impaired balance, and abnormal gait pattern secondary to pain and decreased R hip ROM  Pt currently scheduled for R STEPHANIE on 22 with Dr Natasha Murray  Pt would benefit from skilled OP PT to address these, and the below impairments in order to optimize surgical outcomes and promote return to functional baseline  Pt to be RE following surgery  Impairments: abnormal gait, abnormal muscle firing, abnormal muscle tone, abnormal or restricted ROM, abnormal movement, activity intolerance, impaired balance, impaired physical strength, lacks appropriate home exercise program, pain with function, weight-bearing intolerance, poor posture  and poor body mechanics    Goals    Short Term Goals: In 4 weeks, the patient will:  1  Decrease worst pain by 2 weeks for improved QOL  2  Verbalize and maintain precautions for best surgical outcomes  3  Supervision with HEP for self care  Long Term Goals: In 8 weeks, the patient will:  1  Improve LE strength by 1/2 grade for improved activity tolerance  2  FOTO to greater than predicted value  3  Independent with comprehensive HEP upon discharge    4  Improve TUG testing by 3 seconds for decreased fall risk  5  Progress AD for improved dynamic balance and facilitation of return to PLOF  6  Improve SLS to [facility] for decreased fall risk  Plan  Plan details: RE at f/u on 22, following surgery on 22  Patient would benefit from: skilled physical therapy  Referral necessary: No  Planned modality interventions: cryotherapy  Planned therapy interventions: activity modification, ADL retraining, manual therapy, balance, body mechanics training, balance/weight bearing training, neuromuscular re-education, patient education, postural training, strengthening, stretching, therapeutic activities, therapeutic exercise, home exercise program, graded exercise, graded activity, gait training, functional ROM exercises and flexibility  Frequency: 2x week  Duration in weeks: 8  Plan of Care beginning date: 2022  Treatment plan discussed with: patient        Subjective Evaluation    History of Present Illness  Mechanism of injury: Pt reports 1 year hx of R hip pain  Pt notes as a result, she's been walking with SPC  Pt notes trying PT and injections for R hip pain with minimal relief, and now scheduled for R STEPHANIE on 22  Pain described as ache, and "goes down leg to knee " Pt denies numbness/tingling/BB changes  Pt denies changes in sleep due to pain  Pt had L STEPHANIE 9 years ago with good relief  Pt reports she used to walk 5K's, and wants to return to this after sx     Pain  Current pain ratin  At best pain ratin  At worst pain ratin  Quality: dull ache  Relieving factors: change in position and heat (sitting)  Aggravating factors: sitting and walking  Progression: worsening    Social Support  Steps to enter house: yes (5)  Stairs in house: yes (1 flight)   Lives in: multiple-level home (3 story)  Lives with: parents    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[employer])    Diagnostic Tests  X-ray: abnormal  Treatments  Previous treatment: injection treatment and physical therapy  Patient Goals  Patient goals for therapy: increased strength, independence with ADLs/IADLs, return to sport/leisure activities, increased motion and decreased pain ("be able to walk")          Objective         OBJECTIVE:    Standing Posture & Lower Extremity Alignment:  Structure/Joint         Pelvis (Tilt) X Anterior  Neutral  Posterior   Iliac Crests  Right Elevated  Neutral X Left Elevated   Knee - Frontal   Genuvalgum X Neutral  Genuvarum   Knee - Sagittal  Genurecurvatum X Neutral       Range of Motion: Goniometric revealed the following findings (in degrees)  Joint Motion Right: 2022 Left: 2022   Hip Flex 110 WFL   Hip abd 22 WFL   Hip External Rotation 45 deg [facility]   Hip Internal Rotation 45 deg  WFL   Knee Extension 0 WFL   Knee Flexion [facility] WFL   Ankle Dorsiflexion [facility] WF   Ankle Plantarflexion [facility] WF     Strength: MMT revealed the following findings  Joint Motion Right: 2022 Left: 2022   Hip Flexion 4-/5 +pain 5/5   Hip Abduction 3/5 +pain 4/5   Hip IR 4-/5 5/5   Hip ER 4-/5 +pain 5/5   Hip Extension 4-/5 +pain 5/5   Knee Extension 4+/5 +pain 5/5   Knee Flexion 5/5 5/5   Ankle Plantarflexion 5/5 5/5   Ankle Dorsiflexion 5/5 5/5     Additional Assessments:  Palpation: not TTP globally   Observation: poor standing posture, as noted above  Gait Pattern: dec stance on RLE, dec step length  Balance: SLS R: 20 sec  Joint Mobility: +pain, relief with LAD  TU 9 sec      Lower quarter screen: unremarkable       Special Tests:  Test (Structure evaluated) Date: 2022  ( P / N )   Alvin (Iliapsoas/Rectus Fem) neg   Juan (ITB) neg   90/90 SLR (Hamstring) neg   Ely (Rectus Femoris) + POS   ASHUTOSH + POS   FADIR + POS   Scour + POS   LAD + POS (relief)       Outcome Measures:   FOTO: 49                  Pertinent Findings and Outcome Measures: Test / Measure  4/5/2022      FOTO 49      Hip abd MMT 3/5      Hip flex MMT 4-/5      TUG 9 9 sec          Precautions: s/p R STEPHANIE 4/27/22       Manuals 4/5            R hip PROM w/in precaution                                                    Neuro Re-Ed             Quad set 5"x10 hep            glute set 5"x10 hep            Heel slides with strap 10"x5 hep active           Ankle pumps hep            Mini squats                                       Ther Ex             Bridge+add ball             Ham curls             LAQ             SAQ             HR/TR             Wall slides +glute set                                       Ther Activity                          Step ups             Gait Training             AD training                          Modalities

## 2022-04-06 ENCOUNTER — OFFICE VISIT (OUTPATIENT)
Dept: INTERNAL MEDICINE CLINIC | Facility: CLINIC | Age: 42
End: 2022-04-06
Payer: COMMERCIAL

## 2022-04-06 VITALS
HEART RATE: 84 BPM | OXYGEN SATURATION: 99 % | HEIGHT: 67 IN | DIASTOLIC BLOOD PRESSURE: 74 MMHG | BODY MASS INDEX: 29.88 KG/M2 | WEIGHT: 190.4 LBS | SYSTOLIC BLOOD PRESSURE: 110 MMHG

## 2022-04-06 DIAGNOSIS — M25.551 CHRONIC HIP PAIN, RIGHT: ICD-10-CM

## 2022-04-06 DIAGNOSIS — Z90.3 H/O GASTRIC SLEEVE: ICD-10-CM

## 2022-04-06 DIAGNOSIS — M16.11 PRIMARY OSTEOARTHRITIS OF ONE HIP, RIGHT: ICD-10-CM

## 2022-04-06 DIAGNOSIS — Z01.818 PRE-OP TESTING: ICD-10-CM

## 2022-04-06 DIAGNOSIS — G89.29 CHRONIC HIP PAIN, RIGHT: ICD-10-CM

## 2022-04-06 DIAGNOSIS — M16.11 PRIMARY OSTEOARTHRITIS OF RIGHT HIP: Primary | ICD-10-CM

## 2022-04-06 PROCEDURE — 1036F TOBACCO NON-USER: CPT | Performed by: INTERNAL MEDICINE

## 2022-04-06 PROCEDURE — 99214 OFFICE O/P EST MOD 30 MIN: CPT | Performed by: INTERNAL MEDICINE

## 2022-04-06 PROCEDURE — 3008F BODY MASS INDEX DOCD: CPT | Performed by: INTERNAL MEDICINE

## 2022-04-11 RX ORDER — FERROUS SULFATE TAB EC 324 MG (65 MG FE EQUIVALENT) 324 (65 FE) MG
TABLET DELAYED RESPONSE ORAL
Qty: 30 TABLET | Refills: 0 | Status: SHIPPED | OUTPATIENT
Start: 2022-04-11 | End: 2022-05-04 | Stop reason: ALTCHOICE

## 2022-04-12 NOTE — PRE-PROCEDURE INSTRUCTIONS
Pre-Surgery Instructions:   Medication Instructions    ascorbic acid (VITAMIN C) 500 MG tablet pt instructed to not take on day of surgery     Cetirizine HCl (ZYRTEC PO) pt instructed to not take on day of surgery     etonogestrel-ethinyl estradiol (NuvaRing) 0 12-0 015 MG/24HR vaginal ring birth control no instructions given     ferrous sulfate 324 (65 Fe) mg pt instructed to not take on day of surgery     fluocinolone (SYNALAR) 0 025 % ointment pt uses at hs     folic acid (FOLVITE) 1 mg tablet pt instructed to not take on day of surgery     Misc Natural Products (Turmeric Curcumin) CAPS pt instructed to stop one week prior to surgery     Multiple Vitamins-Minerals (multivitamin with minerals) tablet pt instructed to not take on day of surgery     Pt denies fever, sob, sore throat and cough  Pt verbalized understanding of shower, IS, trice cloth  and med instructions  Pt instructed to stop nsaids and curcumin one week prior to surgery  Pt instructed to continue iron, vit c, folic acid and mvi 
Jonathan Weil, PGY3 - abdominal pain w/ LLQ ttp only. Features mainly c/w gastritis or GERD, low risk for a serious alternative intra-abdominal process, therefore does not warrant imaging at this time. Will check cbc, chemistry, lipase, and provide symptomatic support.

## 2022-04-26 ENCOUNTER — ANESTHESIA EVENT (OUTPATIENT)
Dept: PERIOP | Facility: HOSPITAL | Age: 42
End: 2022-04-26
Payer: COMMERCIAL

## 2022-04-26 ENCOUNTER — ANESTHESIA EVENT (OUTPATIENT)
Dept: ANESTHESIOLOGY | Facility: HOSPITAL | Age: 42
End: 2022-04-26

## 2022-04-26 ENCOUNTER — ANESTHESIA (OUTPATIENT)
Dept: ANESTHESIOLOGY | Facility: HOSPITAL | Age: 42
End: 2022-04-26

## 2022-04-26 NOTE — ANESTHESIA PREPROCEDURE EVALUATION
Procedure:  ARTHROPLASTY HIP TOTAL ANTERIOR and all associated procedures (Right Hip)    Hx of PONV    Relevant Problems   ANESTHESIA   (+) PONV (postoperative nausea and vomiting)      CARDIO   (+) Migraine with aura      GI/HEPATIC   (+) GERD (gastroesophageal reflux disease)      MUSCULOSKELETAL   (+) Primary osteoarthritis of right hip      NEURO/PSYCH   (+) Migraine with aura      PULMONARY   (+) Asthma      Other   (+) H/O gastric sleeve        Physical Exam    Airway    Mallampati score: II  TM Distance: >3 FB  Neck ROM: full     Dental   No notable dental hx     Cardiovascular  Rhythm: regular, Rate: normal, Cardiovascular exam normal    Pulmonary  Pulmonary exam normal Breath sounds clear to auscultation,     Other Findings        Anesthesia Plan  ASA Score- 2     Anesthesia Type- spinal with ASA Monitors  Additional Monitors:   Airway Plan: ETT  Comment: Discussed the risks/benefits of neuraxial anesthesia, including risk of bleeding/infection/damage to underlying structures, the likely side effect of nausea, and unlikely complication of spinal headache  Plan to maintain native airway with EtCO2 monitoring under spinal anesthesia, general anesthesia with OETT discussed as backup          Plan Factors-Exercise tolerance (METS): >4 METS  Patient summary reviewed  Patient instructed to abstain from smoking on day of procedure  Patient did not smoke on day of surgery  Induction-     Postoperative Plan- Plan for postoperative opioid use  Informed Consent- Anesthetic plan and risks discussed with patient  I personally reviewed this patient with the CRNA  Discussed and agreed on the Anesthesia Plan with the CRNA  Rachell Kendrick

## 2022-04-27 ENCOUNTER — APPOINTMENT (OUTPATIENT)
Dept: RADIOLOGY | Facility: HOSPITAL | Age: 42
End: 2022-04-27
Payer: COMMERCIAL

## 2022-04-27 ENCOUNTER — HOSPITAL ENCOUNTER (OUTPATIENT)
Facility: HOSPITAL | Age: 42
Setting detail: OUTPATIENT SURGERY
Discharge: HOME | End: 2022-04-27
Attending: ORTHOPAEDIC SURGERY | Admitting: ORTHOPAEDIC SURGERY
Payer: COMMERCIAL

## 2022-04-27 ENCOUNTER — ANESTHESIA (OUTPATIENT)
Dept: PERIOP | Facility: HOSPITAL | Age: 42
End: 2022-04-27
Payer: COMMERCIAL

## 2022-04-27 VITALS
TEMPERATURE: 98.3 F | OXYGEN SATURATION: 97 % | HEART RATE: 68 BPM | SYSTOLIC BLOOD PRESSURE: 133 MMHG | RESPIRATION RATE: 22 BRPM | DIASTOLIC BLOOD PRESSURE: 65 MMHG

## 2022-04-27 DIAGNOSIS — Z96.641 STATUS POST TOTAL HIP REPLACEMENT, RIGHT: Primary | ICD-10-CM

## 2022-04-27 DIAGNOSIS — M25.551 CHRONIC HIP PAIN, RIGHT: ICD-10-CM

## 2022-04-27 DIAGNOSIS — Z01.818 PRE-OP TESTING: ICD-10-CM

## 2022-04-27 DIAGNOSIS — G89.29 CHRONIC HIP PAIN, RIGHT: ICD-10-CM

## 2022-04-27 DIAGNOSIS — M16.11 PRIMARY OSTEOARTHRITIS OF ONE HIP, RIGHT: ICD-10-CM

## 2022-04-27 LAB
EXT PREGNANCY TEST URINE: NEGATIVE
EXT. CONTROL: NORMAL
GLUCOSE SERPL-MCNC: 94 MG/DL (ref 65–140)

## 2022-04-27 PROCEDURE — 97110 THERAPEUTIC EXERCISES: CPT

## 2022-04-27 PROCEDURE — C1776 JOINT DEVICE (IMPLANTABLE): HCPCS | Performed by: ORTHOPAEDIC SURGERY

## 2022-04-27 PROCEDURE — 97163 PT EVAL HIGH COMPLEX 45 MIN: CPT

## 2022-04-27 PROCEDURE — C1713 ANCHOR/SCREW BN/BN,TIS/BN: HCPCS | Performed by: ORTHOPAEDIC SURGERY

## 2022-04-27 PROCEDURE — 97167 OT EVAL HIGH COMPLEX 60 MIN: CPT

## 2022-04-27 PROCEDURE — 73502 X-RAY EXAM HIP UNI 2-3 VIEWS: CPT

## 2022-04-27 PROCEDURE — 81025 URINE PREGNANCY TEST: CPT | Performed by: STUDENT IN AN ORGANIZED HEALTH CARE EDUCATION/TRAINING PROGRAM

## 2022-04-27 PROCEDURE — 27130 TOTAL HIP ARTHROPLASTY: CPT | Performed by: ORTHOPAEDIC SURGERY

## 2022-04-27 PROCEDURE — 82948 REAGENT STRIP/BLOOD GLUCOSE: CPT

## 2022-04-27 PROCEDURE — 97116 GAIT TRAINING THERAPY: CPT

## 2022-04-27 PROCEDURE — 27130 TOTAL HIP ARTHROPLASTY: CPT | Performed by: PHYSICIAN ASSISTANT

## 2022-04-27 DEVICE — PINNACLE CANCELLOUS BONE SCREW 6.5MM X 20MM
Type: IMPLANTABLE DEVICE | Site: ACETABULUM | Status: FUNCTIONAL
Brand: PINNACLE

## 2022-04-27 DEVICE — ACTIS DUOFIX HIP PROSTHESIS (FEMORAL STEM 12/14 TAPER CEMENTLESS SIZE 5 STD COLLAR)  CE
Type: IMPLANTABLE DEVICE | Site: HIP | Status: FUNCTIONAL
Brand: ACTIS

## 2022-04-27 DEVICE — PINNACLE HIP SOLUTIONS ALTRX POLYETHYLENE ACETABULAR LINER NEUTRAL 36MM ID 54MM OD
Type: IMPLANTABLE DEVICE | Site: ACETABULUM | Status: FUNCTIONAL
Brand: PINNACLE ALTRX

## 2022-04-27 DEVICE — PINNACLE CANCELLOUS BONE SCREW 6.5MM X 25MM
Type: IMPLANTABLE DEVICE | Site: ACETABULUM | Status: FUNCTIONAL
Brand: PINNACLE

## 2022-04-27 DEVICE — PINNACLE GRIPTION ACETABULAR SHELL MULTI-HOLE 54MM OD
Type: IMPLANTABLE DEVICE | Site: ACETABULUM | Status: FUNCTIONAL
Brand: PINNACLE GRIPTION

## 2022-04-27 DEVICE — BIOLOX DELTA CERAMIC FEMORAL HEAD +1.5 36MM DIA 12/14 TAPER
Type: IMPLANTABLE DEVICE | Site: HIP | Status: FUNCTIONAL
Brand: BIOLOX DELTA

## 2022-04-27 RX ORDER — FENTANYL CITRATE 50 UG/ML
INJECTION, SOLUTION INTRAMUSCULAR; INTRAVENOUS AS NEEDED
Status: DISCONTINUED | OUTPATIENT
Start: 2022-04-27 | End: 2022-04-27

## 2022-04-27 RX ORDER — OXYCODONE HYDROCHLORIDE 5 MG/1
10 TABLET ORAL EVERY 4 HOURS PRN
Status: DISCONTINUED | OUTPATIENT
Start: 2022-04-27 | End: 2022-04-27 | Stop reason: HOSPADM

## 2022-04-27 RX ORDER — ASCORBIC ACID 500 MG
500 TABLET ORAL 2 TIMES DAILY
Status: DISCONTINUED | OUTPATIENT
Start: 2022-04-27 | End: 2022-04-27 | Stop reason: HOSPADM

## 2022-04-27 RX ORDER — HYDROMORPHONE HCL/PF 1 MG/ML
0.5 SYRINGE (ML) INJECTION
Status: DISCONTINUED | OUTPATIENT
Start: 2022-04-27 | End: 2022-04-27 | Stop reason: HOSPADM

## 2022-04-27 RX ORDER — DOCUSATE SODIUM 100 MG/1
100 CAPSULE, LIQUID FILLED ORAL 2 TIMES DAILY
Status: DISCONTINUED | OUTPATIENT
Start: 2022-04-27 | End: 2022-04-27 | Stop reason: HOSPADM

## 2022-04-27 RX ORDER — ONDANSETRON 2 MG/ML
4 INJECTION INTRAMUSCULAR; INTRAVENOUS ONCE AS NEEDED
Status: DISCONTINUED | OUTPATIENT
Start: 2022-04-27 | End: 2022-04-27 | Stop reason: HOSPADM

## 2022-04-27 RX ORDER — DOCUSATE SODIUM 100 MG/1
100 CAPSULE, LIQUID FILLED ORAL 2 TIMES DAILY
Qty: 10 CAPSULE | Refills: 0 | Status: SHIPPED | OUTPATIENT
Start: 2022-04-27

## 2022-04-27 RX ORDER — SENNOSIDES 8.6 MG
1 TABLET ORAL DAILY
Status: DISCONTINUED | OUTPATIENT
Start: 2022-04-27 | End: 2022-04-27 | Stop reason: HOSPADM

## 2022-04-27 RX ORDER — ACETAMINOPHEN 325 MG/1
975 TABLET ORAL ONCE
Status: COMPLETED | OUTPATIENT
Start: 2022-04-27 | End: 2022-04-27

## 2022-04-27 RX ORDER — SENNOSIDES 8.6 MG
650 CAPSULE ORAL EVERY 8 HOURS PRN
Qty: 30 TABLET | Refills: 0 | Status: SHIPPED | OUTPATIENT
Start: 2022-04-27 | End: 2022-05-27

## 2022-04-27 RX ORDER — ONDANSETRON 2 MG/ML
4 INJECTION INTRAMUSCULAR; INTRAVENOUS EVERY 6 HOURS PRN
Status: DISCONTINUED | OUTPATIENT
Start: 2022-04-27 | End: 2022-04-27 | Stop reason: HOSPADM

## 2022-04-27 RX ORDER — ASPIRIN 81 MG/1
81 TABLET ORAL 2 TIMES DAILY
Qty: 70 TABLET | Refills: 0 | Status: SHIPPED | OUTPATIENT
Start: 2022-04-27 | End: 2022-06-01

## 2022-04-27 RX ORDER — BUPIVACAINE HYDROCHLORIDE 7.5 MG/ML
INJECTION, SOLUTION INTRASPINAL AS NEEDED
Status: DISCONTINUED | OUTPATIENT
Start: 2022-04-27 | End: 2022-04-27

## 2022-04-27 RX ORDER — CEFAZOLIN SODIUM 2 G/50ML
2000 SOLUTION INTRAVENOUS ONCE
Status: COMPLETED | OUTPATIENT
Start: 2022-04-27 | End: 2022-04-27

## 2022-04-27 RX ORDER — OXYCODONE HYDROCHLORIDE 5 MG/1
TABLET ORAL
Qty: 30 TABLET | Refills: 0 | Status: SHIPPED | OUTPATIENT
Start: 2022-04-27

## 2022-04-27 RX ORDER — DEXAMETHASONE SODIUM PHOSPHATE 10 MG/ML
INJECTION, SOLUTION INTRAMUSCULAR; INTRAVENOUS AS NEEDED
Status: DISCONTINUED | OUTPATIENT
Start: 2022-04-27 | End: 2022-04-27

## 2022-04-27 RX ORDER — ONDANSETRON 2 MG/ML
INJECTION INTRAMUSCULAR; INTRAVENOUS AS NEEDED
Status: DISCONTINUED | OUTPATIENT
Start: 2022-04-27 | End: 2022-04-27

## 2022-04-27 RX ORDER — LIDOCAINE HYDROCHLORIDE 10 MG/ML
INJECTION, SOLUTION EPIDURAL; INFILTRATION; INTRACAUDAL; PERINEURAL AS NEEDED
Status: DISCONTINUED | OUTPATIENT
Start: 2022-04-27 | End: 2022-04-27

## 2022-04-27 RX ORDER — CHLORHEXIDINE GLUCONATE 0.12 MG/ML
15 RINSE ORAL ONCE
Status: COMPLETED | OUTPATIENT
Start: 2022-04-27 | End: 2022-04-27

## 2022-04-27 RX ORDER — GABAPENTIN 300 MG/1
300 CAPSULE ORAL ONCE
Status: COMPLETED | OUTPATIENT
Start: 2022-04-27 | End: 2022-04-27

## 2022-04-27 RX ORDER — CEFAZOLIN SODIUM 1 G/50ML
1000 SOLUTION INTRAVENOUS EVERY 8 HOURS
Status: DISCONTINUED | OUTPATIENT
Start: 2022-04-27 | End: 2022-04-27 | Stop reason: HOSPADM

## 2022-04-27 RX ORDER — PROPOFOL 10 MG/ML
INJECTION, EMULSION INTRAVENOUS CONTINUOUS PRN
Status: DISCONTINUED | OUTPATIENT
Start: 2022-04-27 | End: 2022-04-27

## 2022-04-27 RX ORDER — OXYCODONE HYDROCHLORIDE 5 MG/1
5 TABLET ORAL EVERY 4 HOURS PRN
Status: DISCONTINUED | OUTPATIENT
Start: 2022-04-27 | End: 2022-04-27 | Stop reason: HOSPADM

## 2022-04-27 RX ORDER — MIDAZOLAM HYDROCHLORIDE 2 MG/2ML
INJECTION, SOLUTION INTRAMUSCULAR; INTRAVENOUS AS NEEDED
Status: DISCONTINUED | OUTPATIENT
Start: 2022-04-27 | End: 2022-04-27

## 2022-04-27 RX ORDER — ACETAMINOPHEN 325 MG/1
650 TABLET ORAL EVERY 6 HOURS PRN
Status: DISCONTINUED | OUTPATIENT
Start: 2022-04-27 | End: 2022-04-27 | Stop reason: HOSPADM

## 2022-04-27 RX ORDER — CALCIUM CARBONATE 200(500)MG
1000 TABLET,CHEWABLE ORAL DAILY PRN
Status: DISCONTINUED | OUTPATIENT
Start: 2022-04-27 | End: 2022-04-27 | Stop reason: HOSPADM

## 2022-04-27 RX ORDER — HYDROMORPHONE HCL/PF 1 MG/ML
0.5 SYRINGE (ML) INJECTION EVERY 2 HOUR PRN
Status: DISCONTINUED | OUTPATIENT
Start: 2022-04-27 | End: 2022-04-27 | Stop reason: HOSPADM

## 2022-04-27 RX ORDER — FERROUS SULFATE 325(65) MG
325 TABLET ORAL 2 TIMES DAILY WITH MEALS
Status: DISCONTINUED | OUTPATIENT
Start: 2022-04-27 | End: 2022-04-27 | Stop reason: HOSPADM

## 2022-04-27 RX ORDER — MAGNESIUM HYDROXIDE 1200 MG/15ML
LIQUID ORAL AS NEEDED
Status: DISCONTINUED | OUTPATIENT
Start: 2022-04-27 | End: 2022-04-27 | Stop reason: HOSPADM

## 2022-04-27 RX ORDER — SODIUM CHLORIDE, SODIUM LACTATE, POTASSIUM CHLORIDE, CALCIUM CHLORIDE 600; 310; 30; 20 MG/100ML; MG/100ML; MG/100ML; MG/100ML
125 INJECTION, SOLUTION INTRAVENOUS CONTINUOUS
Status: DISCONTINUED | OUTPATIENT
Start: 2022-04-27 | End: 2022-04-27 | Stop reason: HOSPADM

## 2022-04-27 RX ORDER — FOLIC ACID 1 MG/1
1 TABLET ORAL DAILY
Status: DISCONTINUED | OUTPATIENT
Start: 2022-04-27 | End: 2022-04-27 | Stop reason: HOSPADM

## 2022-04-27 RX ORDER — LIDOCAINE HYDROCHLORIDE 10 MG/ML
0.5 INJECTION, SOLUTION EPIDURAL; INFILTRATION; INTRACAUDAL; PERINEURAL ONCE AS NEEDED
Status: DISCONTINUED | OUTPATIENT
Start: 2022-04-27 | End: 2022-04-27 | Stop reason: HOSPADM

## 2022-04-27 RX ORDER — LIDOCAINE HYDROCHLORIDE AND EPINEPHRINE 10; 10 MG/ML; UG/ML
INJECTION, SOLUTION INFILTRATION; PERINEURAL AS NEEDED
Status: DISCONTINUED | OUTPATIENT
Start: 2022-04-27 | End: 2022-04-27 | Stop reason: HOSPADM

## 2022-04-27 RX ORDER — SODIUM CHLORIDE, SODIUM LACTATE, POTASSIUM CHLORIDE, CALCIUM CHLORIDE 600; 310; 30; 20 MG/100ML; MG/100ML; MG/100ML; MG/100ML
INJECTION, SOLUTION INTRAVENOUS CONTINUOUS PRN
Status: DISCONTINUED | OUTPATIENT
Start: 2022-04-27 | End: 2022-04-27

## 2022-04-27 RX ORDER — SODIUM CHLORIDE, SODIUM LACTATE, POTASSIUM CHLORIDE, CALCIUM CHLORIDE 600; 310; 30; 20 MG/100ML; MG/100ML; MG/100ML; MG/100ML
75 INJECTION, SOLUTION INTRAVENOUS CONTINUOUS
Status: DISCONTINUED | OUTPATIENT
Start: 2022-04-27 | End: 2022-04-27 | Stop reason: HOSPADM

## 2022-04-27 RX ORDER — FENTANYL CITRATE/PF 50 MCG/ML
25 SYRINGE (ML) INJECTION
Status: DISCONTINUED | OUTPATIENT
Start: 2022-04-27 | End: 2022-04-27 | Stop reason: HOSPADM

## 2022-04-27 RX ADMIN — CHLORHEXIDINE GLUCONATE 15 ML: 1.2 RINSE ORAL at 07:17

## 2022-04-27 RX ADMIN — GABAPENTIN 300 MG: 300 CAPSULE ORAL at 07:15

## 2022-04-27 RX ADMIN — TRANEXAMIC ACID 1000 MG: 1 INJECTION, SOLUTION INTRAVENOUS at 08:31

## 2022-04-27 RX ADMIN — CEFAZOLIN SODIUM 2000 MG: 2 SOLUTION INTRAVENOUS at 08:23

## 2022-04-27 RX ADMIN — SODIUM CHLORIDE, SODIUM LACTATE, POTASSIUM CHLORIDE, AND CALCIUM CHLORIDE: .6; .31; .03; .02 INJECTION, SOLUTION INTRAVENOUS at 08:16

## 2022-04-27 RX ADMIN — MIDAZOLAM HYDROCHLORIDE 2 MG: 1 INJECTION, SOLUTION INTRAMUSCULAR; INTRAVENOUS at 08:15

## 2022-04-27 RX ADMIN — PROPOFOL 100 MCG/KG/MIN: 10 INJECTION, EMULSION INTRAVENOUS at 08:34

## 2022-04-27 RX ADMIN — SODIUM CHLORIDE, SODIUM LACTATE, POTASSIUM CHLORIDE, AND CALCIUM CHLORIDE: .6; .31; .03; .02 INJECTION, SOLUTION INTRAVENOUS at 09:47

## 2022-04-27 RX ADMIN — ONDANSETRON 4 MG: 2 INJECTION INTRAMUSCULAR; INTRAVENOUS at 13:40

## 2022-04-27 RX ADMIN — OXYCODONE HYDROCHLORIDE 5 MG: 5 TABLET ORAL at 11:58

## 2022-04-27 RX ADMIN — FENTANYL CITRATE 50 MCG: 50 INJECTION, SOLUTION INTRAMUSCULAR; INTRAVENOUS at 08:28

## 2022-04-27 RX ADMIN — PHENYLEPHRINE HYDROCHLORIDE 60 MCG/MIN: 10 INJECTION INTRAVENOUS at 08:34

## 2022-04-27 RX ADMIN — BUPIVACAINE HYDROCHLORIDE IN DEXTROSE 1.6 ML: 7.5 INJECTION, SOLUTION SUBARACHNOID at 08:33

## 2022-04-27 RX ADMIN — ONDANSETRON 4 MG: 2 INJECTION INTRAMUSCULAR; INTRAVENOUS at 08:45

## 2022-04-27 RX ADMIN — ACETAMINOPHEN 975 MG: 325 TABLET ORAL at 07:15

## 2022-04-27 RX ADMIN — LIDOCAINE HYDROCHLORIDE 5 ML: 10 INJECTION, SOLUTION EPIDURAL; INFILTRATION; INTRACAUDAL at 08:34

## 2022-04-27 RX ADMIN — DEXAMETHASONE SODIUM PHOSPHATE 10 MG: 10 INJECTION, SOLUTION INTRAMUSCULAR; INTRAVENOUS at 08:45

## 2022-04-27 NOTE — DISCHARGE INSTRUCTIONS
Discharge Instructions - Orthopedics  Abdias Petersen 42 y o  female MRN: [id number]  Unit/Bed#: AN OR MAIN    Weight Bearing Status:                                           Weight Bearing as tolerated to the right lower extremity  Anterior total hip precautions    DVT prophylaxis:  Complete course of Aspirin 81 mg twice daily x 35 days from date of surgery as directed    Pain:  Start oxycodone 5 mg every 6 hrs after last dose taken after surgery for 1 day  Transition to oxycodone 5 mg every 6 hours as needed    Showering Instructions:   Do not shower until 5 days from date of surgery    Dressing Instructions: May remove dressing 5 days from date of surgery or may leave dressing in place until follow up office visit 2 weeks from surgery date  Keep dressing/incision clean, dry and intact until follow up appointment  Driving Instructions:  No driving until cleared by Orthopaedic Surgery  PT/OT:  Continue PT/OT on outpatient basis as directed    Appt Instructions: If you do not have your appointment, please call the clinic at [phone]  Otherwise followup as scheduled    Contact the office sooner if you experience any increased numbness/tingling in the extremities

## 2022-04-27 NOTE — OP NOTE
[id number]                                                                                                        [facility]                                                                                                        [facility]    PreOp Dx: right hip DJD    Postop Dx: right hip DJD    Procedure: right anterior total hip arthroplasty    Surgeon: Van Jackson MD    Assistants: Coleen Crouch PA-C    No qualified resident was available for this case  The physician assistant was needed for all portions of this case including prepping and draping the patient as well as prepping and final implantation of the femoral and acetabular components    Fluids:     EBL:     Drains:  none    Specimens:  none    Complications:  none    Condition: stable,Transferred to postanesthesia care unit  Implants: Depuy      Acetabulum size 54    Acetabular poly 54/36  neutral    Femur Actis, size 5    Femoral head size 36/+1 5  ceramic    42 y o  female , presents at this, time, secondary to failed conservative treatment of right hip  DJD secondary to DDH for right anterior total hip arthroplasty  Patient with history of prior left total hip arthroplasty    The patient was told of all the pros and cons of operative and nonoperative intervention  Some of the complications of operative intervention include infection, bleeding, scarring, nerve injury, vascular injury,leg length discrepancy, hip dislocation, continued pain, decreased range of motion, DVT, PE death, loss of limb, fracture, need for further surgery, not obtain an results  The patient wished  Patient did consent for operative intervention for this pathology  Patient was brought to the operating room  Patient was anesthetized as anesthesia team  Patient was prepped and draped in normal sterile fashion  After this was done, we did conduct a time out to make sure correct  Patient was in the room, prepped marked and draped  Implants were in the room, Rep  For the implants were present, DVT prophylaxis and antibiotics were dressed  An anterior approach was used  Incision was made 2 cm lateral and 1 cm distal to the ASIS and extended this incision distally  After going through skin, fatty tissue, fascia ravi was identified and incised  The fascial layer Was incised going towards the ASIS And extending it distally to incorporate our entire incision  We were able to go through the internervous planes until we were able to identify the anterior perforating vessels  Once this was done with able to obtain hemostasis  Able to excise the pre-capsular, fatty tissue  After this was done, we were able to do all releases, which included, the inferior portion of the femoral neck going towards the lesser trochanteric region, the iliocapsularis, and also, the piriformis recess  We then were able to make our femoral neck cut  Femoral head was removed  Labrum was excised  Fatty tissue within the acetabulum was also excised  At this, time  We did remain to be appropriate theta angle and anteversion  We did place a trial acetabular component, and with the aid of radiographic imaging  It was noted that all was appropriate  At this, time, we were able to place, our true acetabular component  Our theta angle and anteversion were noted to be appropriate  Two metaphyseal screws were placed going into the posterior column of the acetabulum  This was done after the utilizing the appropriate drill prior to screw placement  At this time, we are able to place, our true acetabular poly-ethylene  At this, time  We did release on the medial aspect of the greater trochanteric region  We externally rotated  The femur as well as extending at the hip and adducting the femur to obtain appropriate visualization of the proximal femur  We were able to use our box osteotome, canal finder, and rasp in order to make sure there, we avoided any varus, placement of our implant   We did broach to appropriate size, making sure we had correct version  Calcar planer was then used  Trial femoral neck and head were placed  Patient was placed through provocative ranges of motion to make sure that stability was obtained  Radiographic imaging confirmed appropriate leg lengths  At this time  We did remove a trial femoral neck and head  After the hip was dislocated  All broach handle was placed within a trial femoral component to make sure that the implant was still stable  Once this was confirmed, we did remove the trial femoral component  Copious irrigation was used at the operative site  True femoral implant was placed  Trial femoral head was placed once more in order to confirm appropriate leg lengths and appropriate stability  Once as noted to be appropriate, we placed a true femoral head  We did check for stability, leg lengths and final radiographs were taken  Irrigation was used once more at the operative site  Fascial layer was reapproximated with barbed sutures  2-0 Vicryl sutures were used for the subcutaneous layer  Monocryl sutures were utilized for the subcuticular closure  The wounds were cleaned and dried  Exofin sealant was used to augment the closure  4 x 4, ABDs, and Tegaderm was placed   Patient was subsequently awakened noted to be in stable condition and transferred to postanesthesia care unit

## 2022-04-27 NOTE — OCCUPATIONAL THERAPY NOTE
Occupational Therapy Evaluation     Patient Name: Adriana Bennett  Today's Date: 4/27/2022  Problem List  Principal Problem:    Status post total hip replacement, right  Active Problems:    Primary osteoarthritis of right hip    Asthma    GERD (gastroesophageal reflux disease)    PONV (postoperative nausea and vomiting)    Past Medical History  Past Medical History:   Diagnosis Date    Arthritis     Right hip    Asthma     allergy induced    Environmental allergies     dust    GERD (gastroesophageal reflux disease)     Migraine with aura     Osteoarthritis     PONV (postoperative nausea and vomiting)     Seasonal allergies     Wears contact lenses     wears at night     Past Surgical History  Past Surgical History:   Procedure Laterality Date    ANKLE SURGERY Left     ANKLE STABALIZATION    CHOLECYSTECTOMY  2015    COLONOSCOPY      JOINT REPLACEMENT  March 2013    Total left hip replacement    FL LAP, DOMINGO RESTRICT PROC, LONGITUDINAL GASTRECTOMY N/A 3/2/2021    Procedure: ROBOTIC SLEEVE GASTRECTOMY; ROBOTIC PARAESOPHAGEAL HERNIA REPAIR;  Surgeon: Ya Coronel MD;  Location: [facility];  Service: Bariatrics    TOOTH EXTRACTION      TOTAL HIP ARTHROPLASTY Left              04/27/22 1426   OT Last Visit   OT Visit Date 04/27/22   Note Type   Note type Evaluation   Restrictions/Precautions   Weight Bearing Precautions Per Order Yes   RLE Weight Bearing Per Order WBAT  (Anterior STEPHANIE)   Other Precautions Multiple lines; Fall Risk   Pain Assessment   Pain Assessment Tool 0-10   Pain Score 3   Pain Location/Orientation Orientation: Right;Location: Hip   Home Living   Type of [address] [place] [address] Two level;1/2 bath on main level;Bed/bath upstairs;Stairs to enter with rails  (5 MAC )   Bathroom Shower/Tub Walk-in shower   Bathroom Toilet Standard   Bathroom Equipment Shower chair;Grab bars around toilet   [address] Walker;Cane   Additional Comments no use of AD at baseline   Prior Function   Lives With Other (Comment)  (Parents)   Receives Help From Family   ADL Assistance Independent   IADLs Independent   Falls in the last 6 months 0   Vocational Full time employment  ([address] [id number])   Comments (+)drives   Lifestyle   Autonomy PTA pt living with parents in [facility], pt (I) with all ADLs and IADLs, (+)works, (+)drives   Reciprocal Relationships supportive parents will be home on d/c   Service to Others fulltime employment at [address] [employer], mostly sedentary   [address] enjoys being active, walking 5Ks, reading, using rowing machine   Subjective   Subjective "I had this done 9 years ago"   ADL   Eating Assistance [address] [facility] 6  Modified independent   LB Dressing Deficit Increased time to complete  (donning shoes and pants)   Toileting Assistance  6  Modified independent   Toileting Deficit Increased time to complete   Bed Mobility   Additional Comments OOB and in chair at start and end of session   Transfers   Sit to Stand 6  Modified independent   Stand to Sit 6  Modified independent   Additional Comments use of RW, no VC needed   Functional Mobility   Functional Mobility 5  Supervision   Additional Comments functional household distance   Additional items Rolling walker   Balance   Static Sitting Good   Dynamic Sitting Fair +   Static Standing Fair +   Dynamic Standing Fair +   Ambulatory Fair   Activity Tolerance   Activity Tolerance Patient tolerated treatment well   Medical Staff Made Aware PT LEENA, RN Kathleen ARREOLA Assessment   RUE Assessment WFL   LUE Assessment   LUE Assessment WFL   Hand Function   Gross Motor Coordination Functional   Fine Motor Coordination Functional   Cognition   Overall Cognitive Status WFL   Arousal/Participation Alert; Cooperative   Attention Within functional limits   Orientation Level Oriented X4   Memory Within functional limits   Following Commands Follows all commands and directions without difficulty   Comments pleasant and cooperative   Assessment   Prognosis Good   Assessment Pt is a 42 y o  female seen for OT evaluation s/p admission to [facility] on 4/27/2022 for R elective STEPHANIE, anterior approach  Pt is currently POD #0 and is WBAT  Pt with no recent admissions in the last 2 months  Pt has a significant PMH impacting occupational performance including: asthma, hx L STEPHANIE  Pt with active OT evaluation and treatment orders and activity orders for activity beginning POD #0  PTA pt living with parents in [facility], pt (I) with all ADLs and IADLs, (+)works, (+)drives  Pt is motivated to return to home today  Personal and environmental factors supporting pt at time of IE include age, social support, attitude towards recovery and accessible home environment  During evaluation pt performed as is outlined above in flowsheet  Pt demonstrating good understanding of education and techniques  Standardized assessments used to assist in identifying performance deficits include AMPAC 6-Clicks and Barthel ADL Index  Performance deficits that affect the pts occupational performance during the initial evaluation include impaired balance, functional mobility, endurance, activity tolerance, functional standing tolerance and overall strength  No further acute OT needs identified at this time  Recommend continued active ADL participation and mobilization with hospital staff while in the hospital to increase pts endurance and strength upon D/C  From OT standpoint, recommend D/C to home with family support when medically cleared  D/C pt from OT caseload at this time      Goals   Patient Goals to walk a 5K   Plan   OT Frequency Eval only   Recommendation   OT Discharge Recommendation No rehabilitation needs  (no OT needs)   AM-PAC Daily Activity Inpatient   Lower Body Dressing 4   Bathing 4   Toileting 4   Upper Body Dressing 4   Grooming 4   Eating 4   Daily Activity Raw Score 24   Daily Activity Standardized Score (Calc for Raw Score >=11) 57 54   AM-[facility] Applied Cognition Inpatient   Following a Speech/Presentation 4   Understanding Ordinary Conversation 4   Taking Medications 4   Remembering Where Things Are Placed or Put Away 4   Remembering List of 4-5 Errands 4   Taking Care of Complicated Tasks 4   Applied Cognition Raw Score 24   Applied Cognition Standardized Score 62 21   Barthel Index   Feeding 10   Bathing 0   Grooming Score 5   Dressing Score 10   Bladder Score 0   Bowels Score 10   Toilet Use Score 10   Transfers (Bed/Chair) Score 10   Mobility (Level Surface) Score 10   Stairs Score 5   Barthel Index Score 70     Pt with no acute OT needs, will d/c from OT services at this time  The patient's raw score on the AM-PAC Daily Activity inpatient short form is 24, standardized score is 57 54, greater than 39 4  Patients at this level are likely to benefit from discharge to home  Please refer to the recommendation of the Occupational Therapist for safe discharge planning      At the end of the session, all needs met and pt sitting in w/c, call bell within [facility], OTR/L

## 2022-04-27 NOTE — INTERVAL H&P NOTE
H&P reviewed  After examining the patient I find no changes in the patients condition since the H&P had been written      Vitals:    04/27/22 0707   BP: 131/75   Pulse: 70   Resp: 19   Temp: 98 6 °F (37 °C)   SpO2: 98%   Patient seen and examined  To the OR for right anterior STEPHANIE  Pros and cons of op and non-op intervention discussed with patient  We will follow up postoperatively

## 2022-04-27 NOTE — ANESTHESIA POSTPROCEDURE EVALUATION
Post-Op Assessment Note    CV Status:  Stable  Pain Score: 0    Pain management: adequate     Mental Status:  Awake and alert   Hydration Status:  Stable   PONV Controlled:  None   Airway Patency:  Patent and adequate      Post Op Vitals Reviewed: Yes      Staff: CRNA, Anesthesiologist         No complications documented      BP   117/58   Temp 97 7   Pulse  70   Resp   16   SpO2   100%

## 2022-04-27 NOTE — PHYSICAL THERAPY NOTE
PHYSICAL THERAPY EVALUATION NOTE    Patient Name: Althea Rushing  BIDTC'E Date: 4/27/2022  AGE:   42 y o  Mrn:   [id number]  ADMIT DX:  Primary osteoarthritis of one hip, right [M16 11]  Chronic hip pain, right [M25 551, G89 29]  Pre-op testing [Z01 818]    Past Medical History:   Diagnosis Date    Arthritis     Right hip    Asthma     allergy induced    Environmental allergies     dust    GERD (gastroesophageal reflux disease)     Migraine with aura     Osteoarthritis     PONV (postoperative nausea and vomiting)     Seasonal allergies     Wears contact lenses     wears at night     Length Of Stay: 0  PHYSICAL THERAPY EVALUATION :    04/27/22 1325   PT Last Visit   PT Visit Date 04/27/22   Pain Assessment   Pain Assessment Tool 0-10   Pain Score 2   Pain Location/Orientation Other (Comment)  (right thigh)   Restrictions/Precautions   RLE Weight Bearing Per Order WBAT  (and anterior hip precautions)   Other Precautions Multiple lines; Fall Risk;Pain;THR;WBS   Home Living   Type of [address] [place] [address] Multi-level;1/2 bath on main level;Bed/bath upstairs; Other (Comment)  (5 MAC w/ railing)   Additional Comments lives w/ mom  ambulates w/ cane  owns roller walker  independent w/ ADLs and driving  no falls in last 6 months  works at [employer]   Additional Pertinent History room air resting pulse ox 126/65  pt reported lightheadedness and nausea w/ sitting, blood pressure was 104/59  room air resting pulse ox 98% and 67 BPM, active 95% and 81 BPM    Family/Caregiver Present Yes   Cognition   Arousal/Participation Alert   Orientation Level Oriented to person; Other (Comment)  (pt was identified w/ full name, birth date)   Following Commands Follows one step commands without difficulty   Subjective   Subjective pt seen supine in bed w/ mom present  pt agreed to PT eval  reports having right thigh pain  RUE Assessment   RUE Assessment WFL   LUE Assessment   LUE Assessment WFL   RLE Assessment   RLE Assessment X  (hip flex 3-/5 ext 3+/5, knee flex 3-/5 ext 3+/5, ankle 3+/5)   LLE Assessment   LLE Assessment WFL  (4+ to 5/5)   Light Touch   RLE Light Touch Grossly intact   LLE Light Touch Grossly intact   Bed Mobility   Supine to Sit 4  Minimal assistance  (+ lightheaded/nausea)   Additional items Assist x 1;HOB elevated; Bedrails; Increased time required;Verbal cues;LE management  (for trunk/LE positioning, hip precaution adherance)   Transfers   Sit to Stand 4  Minimal assistance   Additional items Assist x 1; Increased time required;Verbal cues  (for hand placement)   Stand to Sit 5  Supervision   Additional items Increased time required;Verbal cues  (for hand placement)   Additional Comments pt completed standing weight shifts and advance/retreat 3x each w/ roller walker and supervision  pt then ambulated as below  Ambulation/Elevation   Gait pattern Antalgic;Decreased R stance   Gait Assistance 4  Minimal assist   Additional items Assist x 1;Verbal cues  (for walker positioning, sequencing)   Assistive Device Rolling walker   Distance 6 feet  (additional not possible due to fatigue, lightheadedness)   Balance   Static Sitting Good   Dynamic Sitting Fair   Static Standing Fair -   Ambulatory Poor +  (w/ roller walker)   Activity Tolerance   Activity Tolerance Patient limited by fatigue;Patient limited by pain   Nurse Made Aware spoke to Chris House OT, Dr Hema Rayo AP   Assessment   Prognosis Good   Problem List Decreased strength;Decreased range of motion;Decreased endurance; Impaired balance;Decreased mobility; Decreased safety awareness;Pain;Orthopedic restrictions   Assessment Pt presents secondary to failed conservative treatment of right hip  DJD secondary to DDH for right anterior total hip arthroplasty  Pt with history of prior left total hip arthroplasty  Dx: right hip DJD  4/27/22 right anterior STEPHANIE  order placed for PT eval and tx, w/ activity order of activity as tolerated, anterior hip precautions and WBAT R LE  pt presents w/ comorbidities of left STEPHANIE, migraine, asthma, and OA and personal factors of living in 2 story house, mobilizing w/ assistive device and stair(s) to enter home  pt presents w/ pain, weakness, decreased ROM, decreased endurance, impaired balance, gait deviations, decreased safety awareness, orthopedic restrictions and fall risk  these impairments are evident in findings from physical examination (weakness and decreased ROM), mobility assessment (need for standby to min assist w/ all phases of mobility when usually mobilizing independently, tolerance to only 6 feet of ambulation and need for cueing for mobility technique), and Barthel Index: 65/100  pt needed input for mobility technique and hip precaution compliance  pt is at risk for falls due to physical and safety awareness deficits  pt's clinical presentation is unstable/unpredictable (evident in poor blood pressure control, need for assist w/ all phases of mobility when usually mobilizing independently, tolerance to only 6 feet of ambulation and need for input for mobility technique/safety)  pt needs inpatient PT tx to improve mobility deficits and progress mobility training as appropriate  discharge recommendation is for outpatient PT to reduce fall risk and maximize level of functional independence  Goals   Patient Goals walk a 5k  STG Expiration Date 05/01/22   Short Term Goal #1 pt will:  Increase R LE strength 1/2 grade to facilitate independent mobility, Perform bed mobility independently to increase level of independence, Perform all transfers independently to improve independence, Ambulate 250 ft  with roller walker independently w/o LOB to improve functional independence and facilitate eventual return to walking in 5Ks, Navigate 10 stair(s) modified independent with unilateral handrail to facilitate return to previous living environment, Increase ambulatory balance 1 grade to decrease risk for falls, Complete exercise program independently to increase strength and endurance, Tolerate 3 hr OOB to faciliate upright tolerance, Improve gait speed to 0 33 m/s to reduce fall risk and increase independence, Improve Barthel Index score to 90 or greater to facilitate independence and Recall and adhere to anterior hip precautions of R LE to facilitate surgical healing   PT Treatment Day 1   Plan   Treatment/Interventions Functional transfer training;LE strengthening/ROM; Elevations; Therapeutic exercise; Endurance training;Patient/family training;Equipment eval/education; Bed mobility;Gait training   PT Frequency Twice a day   Recommendation   PT Discharge Recommendation Home with outpatient rehabilitation   AM-PAC Basic Mobility Inpatient   Turning in Bed Without Bedrails 4   Lying on Back to Sitting on Edge of Flat Bed 3   Moving Bed to Chair 3   Standing Up From Chair 3   Walk in Room 3   Climb 3-5 Stairs 3   Basic Mobility Inpatient Raw Score 19   Basic Mobility Standardized Score 42 48   Highest Level Of Mobility   JH-HLM Goal 6: Walk 10 steps or more   JH-HLM Highest Level of Mobility 7: Walk 25 feet or more   JH-HLM Goal Achieved Yes   Barthel Index   Feeding 10   Bathing 0   Grooming Score 5   Dressing Score 5   Bladder Score 0   Bowels Score 10   Toilet Use Score 10   Transfers (Bed/Chair) Score 10   Mobility (Level Surface) Score 10   Stairs Score 5   Barthel Index Score 65   Additional Treatment Session   Start Time 1325   End Time 1408   Treatment Assessment Therapist provided education to pt for mobility technique including anterior hip precautions, transfer technique and roller walker use  Education was provided due to findings from evaluation  Sit <---> stand transfers w/ supervision  Ambulates 90 feet w/ roller walker w/ supervision  additional not possible due to fatigue   Pt used 3 steps w/ single railing and single point cane w/ supervision  additional not possible due to pt's reports of lightheadedness  Pt was noted to have improvement in functional status after education w/ decreased level of assist necessary to maintain safety and improved ambulation tolerance  Pt continues to be a fall risk  Also introduced LE exercises to address physical and endurance impairments noted during eval  Ankle pumps 30  Quad sets and heel slides 10 each  Handout was provided to expedite understanding of technique  Pt needed short rest breaks due to fatigue and pain  Therapist reviewed anterior hip precautions  pt was provided w/ hip precautions handout  pt had good understanding of guidelines  continued inpatient PT tx is indicated to reduce fall risk factors and progress mobility training as appropriate  Equipment Use roller walker w/ ambulation, cane use w/ steps   Additional Treatment Day 1   End of Consult   Patient Position at End of Consult Bedside chair; All needs within reach     Comfortable Gait Speed: 0 23 m/s  Gait Speed Interpretation:  Gain of 0 1 m/s is a predictor of well-being in those w/ abnormal walking speed compared to age-patched peers  <0 7m/s is at an increased risk for falls    Household ambulator: <0 4 m/s  Limited community ambulator: 0 4-0 8 m/s  Community ambulator: 0 8-1 2 m/s  Able to safely cross streets: >1 2 m/s    The patient's AM-PAC Basic Mobility Inpatient Short Form Raw Score is 19  A Raw score of greater than 16 suggests the patient may benefit from discharge to home  Please also refer to the recommendation of the Physical Therapist for safe discharge planning  Skilled PT recommended while in hospital and upon DC to progress pt toward treatment goals       Nicky Rush, PT

## 2022-04-27 NOTE — PLAN OF CARE
Problem: PHYSICAL THERAPY ADULT  Goal: Performs mobility at highest level of function for planned discharge setting  See evaluation for individualized goals  Description: Treatment/Interventions: Functional transfer training,LE strengthening/ROM,Elevations,Therapeutic exercise,Endurance training,Patient/family training,Equipment eval/education,Bed mobility,Gait training          See flowsheet documentation for full assessment, interventions and recommendations  Note: Prognosis: Good  Problem List: Decreased strength,Decreased range of motion,Decreased endurance,Impaired balance,Decreased mobility,Decreased safety awareness,Pain,Orthopedic restrictions  Assessment: Pt presents secondary to failed conservative treatment of right hip  DJD secondary to DDH for right anterior total hip arthroplasty  Pt with history of prior left total hip arthroplasty  Dx: right hip DJD  4/27/22 right anterior STEPHANIE  order placed for PT eval and tx, w/ activity order of activity as tolerated, anterior hip precautions and WBAT R LE  pt presents w/ comorbidities of left STEPHANIE, migraine, asthma, and OA and personal factors of living in 2 story house, mobilizing w/ assistive device and stair(s) to enter home  pt presents w/ pain, weakness, decreased ROM, decreased endurance, impaired balance, gait deviations, decreased safety awareness, orthopedic restrictions and fall risk  these impairments are evident in findings from physical examination (weakness and decreased ROM), mobility assessment (need for standby to min assist w/ all phases of mobility when usually mobilizing independently, tolerance to only 6 feet of ambulation and need for cueing for mobility technique), and Barthel Index: 65/100  pt needed input for mobility technique and hip precaution compliance  pt is at risk for falls due to physical and safety awareness deficits   pt's clinical presentation is unstable/unpredictable (evident in poor blood pressure control, need for assist w/ all phases of mobility when usually mobilizing independently, tolerance to only 6 feet of ambulation and need for input for mobility technique/safety)  pt needs inpatient PT tx to improve mobility deficits and progress mobility training as appropriate  discharge recommendation is for outpatient PT to reduce fall risk and maximize level of functional independence  PT Discharge Recommendation: Home with outpatient rehabilitation          See flowsheet documentation for full assessment

## 2022-04-27 NOTE — ANESTHESIA PROCEDURE NOTES
Spinal Block    Patient location during procedure: OR  Start time: 4/27/2022 8:30 AM  Staffing  Performed: Anesthesiologist and CRNA   Anesthesiologist: Krishna Avalos MD  Resident/CRNA: Heidi Hutchinson CRNA  Preanesthetic Checklist  Completed: patient identified, IV checked, site marked, risks and benefits discussed, surgical consent, monitors and equipment checked, pre-op evaluation and timeout performed  Spinal Block  Patient position: sitting  Prep: Betadine  Patient monitoring: heart rate, cardiac monitor, continuous pulse ox and frequent blood pressure checks  Approach: midline  Location: L3-4  Injection technique: single-shot  Needle  Needle type: pencil-tip   Needle gauge: 25 G  Needle length: 5 cm  Assessment  Sensory level: T10  Events: cerebrospinal fluid  Injection Assessment:  negative aspiration for heme, no paresthesia on injection and positive aspiration for clear CSF    Post-procedure:  site cleaned

## 2022-04-28 ENCOUNTER — TELEPHONE (OUTPATIENT)
Dept: OBGYN CLINIC | Facility: HOSPITAL | Age: 42
End: 2022-04-28

## 2022-04-28 NOTE — TELEPHONE ENCOUNTER
Patient contacted for a postoperative follow up assessment  Patient reports 3/10 pain when sitting and 8/10 when walking with RW  Patient states The incision itself is burning when I walk and get dressed but other than that its not bad and I have ice on it   Patient confirmed next post-op PT appointment, 5/2 at [facility]       Patient is taking Oxycodone 5mg every 6 hours, Tylenol 650mg every 8 hours, ASA 81mg BID, Colace 100mg BID  Patient denies a BM but is passing gas  Patient denies increase in swelling and dressing is clean, dry and intact  Patient is icing the site regularly  Patient denies nausea, vomiting, abdominal pain, chest pain, shortness of breath, fever, dizziness and calf pain  Patient confirmed post-op appointment with surgeon on 5/11 at 1:15PM and SOC IM 5/4 at 11:15AM   Patient does not have any other questions or concerns at this time

## 2022-05-02 ENCOUNTER — OFFICE VISIT (OUTPATIENT)
Dept: PHYSICAL THERAPY | Facility: REHABILITATION | Age: 42
End: 2022-05-02
Payer: COMMERCIAL

## 2022-05-02 DIAGNOSIS — Z96.641 STATUS POST TOTAL HIP REPLACEMENT, RIGHT: ICD-10-CM

## 2022-05-02 DIAGNOSIS — M25.551 CHRONIC HIP PAIN, RIGHT: ICD-10-CM

## 2022-05-02 DIAGNOSIS — G89.29 CHRONIC HIP PAIN, RIGHT: ICD-10-CM

## 2022-05-02 DIAGNOSIS — M16.11 PRIMARY OSTEOARTHRITIS OF ONE HIP, RIGHT: Primary | ICD-10-CM

## 2022-05-02 PROCEDURE — 97110 THERAPEUTIC EXERCISES: CPT

## 2022-05-02 PROCEDURE — 97112 NEUROMUSCULAR REEDUCATION: CPT

## 2022-05-02 PROCEDURE — 97140 MANUAL THERAPY 1/> REGIONS: CPT

## 2022-05-02 PROCEDURE — 97164 PT RE-EVAL EST PLAN CARE: CPT

## 2022-05-02 NOTE — PROGRESS NOTES
PT Re-Evaluation     Today's date: 2022  Patient name: Jannie Flores  : 1980  MRN: [id number]  Referring provider: Alok Welch MD  Dx:   Encounter Diagnosis     ICD-10-CM    1  Primary osteoarthritis of one hip, right  M16 11    2  Chronic hip pain, right  M25 551     G89 29    3  Status post total hip replacement, right  Z96 641        Start Time: 1015  Stop Time: 1100  Total time in clinic (min): 45 minutes    Assessment/Plan     Assessment  Assessment details: Jannie Flores is a 42y o  year old female with a referred dx of chronic R hip pain, R hip OA, and s/p R STEPHANIE  Pt is now s/p R STEPHANIE on 22 with anterior approach, following anterior hip precautions and WBAT RLE  Pt presents with pain with functional activites such as sitting and standing for long periods, stair negotiation, and poor sleep tolerance secondary to pain  Upon further clinical testing, pt demonstrates decreased RLE strength, restricted ROM secondary to pain and precautions, and abnormal gait pattern secondary to pain and decreased R hip ROM  Pt would benefit from skilled OP PT to address these, and the below impairments in order to optimize surgical outcomes and promote return to functional baseline  Impairments: abnormal gait, abnormal muscle firing, abnormal muscle tone, abnormal or restricted ROM, abnormal movement, activity intolerance, impaired balance, impaired physical strength, lacks appropriate home exercise program, pain with function, weight-bearing intolerance, poor posture  and poor body mechanics    Goals    Short Term Goals: In 4 weeks, the patient will:  1  Decrease worst pain by 2 weeks for improved QOL  2  Verbalize and maintain precautions for best surgical outcomes  3  Supervision with [facility] for self care  Long Term Goals: In 8 weeks, the patient will:  1  Improve LE strength by 1/2 grade for improved activity tolerance  2  FOTO to greater than predicted value    3  Independent with comprehensive HEP upon discharge  4  Improve TUG testing by 3 seconds for decreased fall risk  5  Progress AD for improved dynamic balance and facilitation of return to OF  6  Improve SLS to [facility] for decreased fall risk  Plan  Patient would benefit from: skilled physical therapy  Referral necessary: No  Planned modality interventions: cryotherapy  Planned therapy interventions: activity modification, ADL retraining, manual therapy, balance, body mechanics training, balance/weight bearing training, neuromuscular re-education, patient education, postural training, strengthening, stretching, therapeutic activities, therapeutic exercise, home exercise program, graded exercise, graded activity, gait training, functional ROM exercises and flexibility  Frequency: 2x week  Duration in weeks: 12  Plan of Care beginning date: 2022  Treatment plan discussed with: patient          Subjective    Subjective Evaluation    Pt is now s/p R STEPHANIE on 22 with anterior approach, following anterior hip precautions and WBAT RLE  Pt reports difficulty sleep secondary to pain in R knee  Pt able to verbalizes precautions  Pt reports she hasn't needed to take prescription pain meds daily  Pt reports she has adequate help at home while having decreased independence with ADLs  Currently ambulating with RW          Pain  Current pain rating: 3  At best pain rating: 3  At worst pain ratin  Quality: dull ache, stabbing  Relieving factors: change in position and heat (sitting)  Aggravating factors: sitting and walking  Progression: worsening    Social Support  Steps to enter house: yes (5)  Stairs in house: yes (1 flight)   Lives in: multiple-level home (3 story)  Lives with: parents    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[employer])    Diagnostic Tests  X-ray: abnormal  Treatments  Previous treatment: injection treatment and physical therapy  Patient Goals  Patient goals for therapy: increased strength, independence with ADLs/IADLs, return to sport/leisure activities, increased motion and decreased pain ("be able to walk")            Objective    Flowsheet Rows      Most Recent Value   PT/OT G-Codes    Current Score 49   Projected Score 68          Objective    Range of Motion: Goniometric revealed the following findings (in degrees)  Joint Motion Right:    Hip Flex 90 +pain   Hip abd 10   Hip External Rotation NT   Hip Internal Rotation 10 deg    Knee Extension 0   Knee Flexion WFL   Ankle Dorsiflexion WFL   Ankle Plantarflexion WFL     Strength: MMT revealed the following findings  Joint Motion Right:    Hip Flexion 3-/5 +pain   Hip Abduction 3-/5 +pain   Hip IR 3-/5 +pain   Hip ER 3-/5 +pain   Hip Extension 3-/5 +pain   Knee Extension 4+/5 +pain   Knee Flexion 5/5   Ankle Plantarflexion 5/5   Ankle Dorsiflexion 5/5     Additional Assessments:  Palpation: not TTP globally   Observation: poor standing posture, as noted above  Gait Pattern: dec stance on RLE, dec step length, RW  Balance: unable to perform SLS  TU 4 sec      Lower quarter screen: unremarkable           Outcome Measures:   FOTO: 33                  Pertinent Findings and Outcome Measures:                                                                                                                                                                         Test / Measure  2022     FOTO 49 33     Hip abd MMT 3/5 3-/5     Hip flex MMT 4-/5 3-/5     TUG 9 9 sec 28 4 sec              Precautions: s/p R STEPHANIE 22, ant hip precautions       Manuals            R hip PROM w/in precaution  obj testing                                                  Neuro Re-Ed             Quad set 5"x10 hep 5"x10           glute set 5"x10 hep 5"x10           Heel slides with strap 10"x5 hep 2x10 active           Ankle pumps hep            Mini squats  2x10           Add ball squeeze  3" 2x10 hep                        Ther Ex Bridge  2x8 hep Add ball NV          Ham curls  2x10 hep           LAQ  2x8 hep           SAQ  2x8           TR  2x10           Wall slides +glute set                                       Ther Activity                          Step ups             Gait Training             AD training                          Modalities

## 2022-05-04 ENCOUNTER — OFFICE VISIT (OUTPATIENT)
Dept: INTERNAL MEDICINE CLINIC | Facility: CLINIC | Age: 42
End: 2022-05-04
Payer: COMMERCIAL

## 2022-05-04 VITALS
DIASTOLIC BLOOD PRESSURE: 62 MMHG | WEIGHT: 190.4 LBS | BODY MASS INDEX: 29.88 KG/M2 | SYSTOLIC BLOOD PRESSURE: 108 MMHG | HEIGHT: 67 IN

## 2022-05-04 DIAGNOSIS — Z96.641 STATUS POST TOTAL HIP REPLACEMENT, RIGHT: Primary | ICD-10-CM

## 2022-05-04 DIAGNOSIS — K21.00 GASTROESOPHAGEAL REFLUX DISEASE WITH ESOPHAGITIS WITHOUT HEMORRHAGE: ICD-10-CM

## 2022-05-04 PROCEDURE — 99214 OFFICE O/P EST MOD 30 MIN: CPT | Performed by: INTERNAL MEDICINE

## 2022-05-04 PROCEDURE — 1036F TOBACCO NON-USER: CPT | Performed by: INTERNAL MEDICINE

## 2022-05-04 PROCEDURE — 3008F BODY MASS INDEX DOCD: CPT | Performed by: INTERNAL MEDICINE

## 2022-05-04 RX ORDER — HYDROCODONE BITARTRATE AND HOMATROPINE METHYLBROMIDE ORAL SOLUTION 5; 1.5 MG/5ML; MG/5ML
LIQUID ORAL
COMMUNITY

## 2022-05-04 RX ORDER — SALICYLIC ACID 40 %
ADHESIVE PATCH, MEDICATED TOPICAL
COMMUNITY
Start: 2022-04-27

## 2022-05-04 NOTE — PATIENT INSTRUCTIONS
· Follow up with orthopedic surgery as scheduled for incision and post operative care  · Follow up with PCP for ongoing medical care as previous to surgery  · You are released form care from the surgical optimization medical team however we remain available should you have any questions in the future  · Call [phone] with any future concerns or questions related to your surgery

## 2022-05-04 NOTE — PROGRESS NOTES
SOC-INTERNAL MEDICINE POST-OPERATIVE VISIT      NAME: Jeannine Butcher  AGE: 42 y o  SEX: female  : 1980     DATE: 2022     Internal Medicine Pre-Operative Evaluation:     Reason for Visit: Post-operative follow up SOC-IM     Surgery: Arthroplasty of right Hip  Surgeon: Dr Casey Shen  Primary Care  Provider: Brunilda Campoverde, DO    Interval History     Pt is seen for post operative follow up for elective arthroplasty  Post operatively they are doing well, they are utilizing pain meds including oxycodone  We discussed non pharmacologic measures such as elevating when sitting, applying ice in 20 minute intervals with barrier in between  I have also encouraged them to get up at least every 30 minutes and ambulate short distances to prevent edema and blood clots  They are taking their ASA as directed by their surgeon for DVT prophylaxis  The patient has had BM since discharge  I did recommend using stool softeners in combination with narcotics  I also encouraged plenty of hydration, again ambulating short distances every 30 minutes as well as high fiber diet  They are taking DVT prophylaxis medications as directed without any issues    They continue to participate in therapy without any issues and as directed by their surgeon  They have resumed all of their pre operative medications as instructed  They have scheduled f/u with orthopedics as well as with their PCP                 Assessment  Plan:     Status Post Total Joint Arthroplasty  · doing well post right Hip  arthroplasty  · Following surgeon recommended DVT prophylaxis  · attending physical therapy sessions and progressing  · current pain scale reported 2/10  · follow up appointment scheduled for incision check and surgical follow up with orthopedics  · medication reconciliation performed  · patient to resume follow up care with their PCP for ongoing care of their pre-surgical co-morbidities    GERD  · Stable  · Cont PPI    Patient's additional co morbidities as below which will have ongoing medical management per the patients PCP:    Patient Active Problem List   Diagnosis    Primary osteoarthritis of right hip    Migraine with aura    Asthma    GERD (gastroesophageal reflux disease)    H/O gastric sleeve    PONV (postoperative nausea and vomiting)    Status post total hip replacement, right         Review of Systems:      No TIA's or unusual headaches, no dysphagia  No prolonged cough  No dyspnea or chest pain on exertion  No abdominal pain, change in bowel habits, black or bloody stools  No urinary tract or BPH symptoms  Pt with mild pain in surgical joint          Current Medications:     Current Outpatient Medications   Medication Instructions    acetaminophen (TYLENOL) 650 mg, Oral, Every 8 hours PRN    ascorbic acid (VITAMIN C) 500 mg, Oral, 2 times daily    aspirin (ECOTRIN LOW STRENGTH) 81 mg, Oral, 2 times daily, Please do not start taking until after total joint arthroplasty    Cetirizine HCl (ZYRTEC PO) 10 mg, Oral, Daily    docusate sodium (COLACE) 100 mg, Oral, 2 times daily    etonogestrel-ethinyl estradiol (NuvaRing) 0 12-0 015 MG/24HR vaginal ring 1 each, Vaginal, Every 28 days    ferrous sulfate 324 (65 Fe) mg TAKE 1 TABLET BY MOUTH 2 TIMES A DAY BEFORE MEALS    fluocinolone (SYNALAR) 0 025 % ointment Daily at bedtime, Around the mouth    folic acid (FOLVITE) 1 mg, Oral, Daily    Misc Natural Products (Turmeric Curcumin) CAPS Oral, Daily    Multiple Vitamins-Minerals (multivitamin with minerals) tablet 1 tablet, Oral, Daily    oxyCODONE (ROXICODONE) 5 immediate release tablet Take 1 tablets every 6 hrs as needed for pain control          Past History:       Past Medical History:   Diagnosis Date    Arthritis     Right hip    Asthma     allergy induced    Environmental allergies     dust    GERD (gastroesophageal reflux disease)     Migraine with aura     Osteoarthritis     PONV (postoperative nausea and vomiting)  Seasonal allergies     Wears contact lenses     wears at night    Past Surgical History:   Procedure Laterality Date    ANKLE SURGERY Left     ANKLE STABALIZATION    CHOLECYSTECTOMY  2015    COLONOSCOPY      JOINT REPLACEMENT  March 2013    Total left hip replacement    UT LAP, DOMINGO RESTRICT PROC, LONGITUDINAL GASTRECTOMY N/A 3/2/2021    Procedure: ROBOTIC SLEEVE GASTRECTOMY; ROBOTIC PARAESOPHAGEAL HERNIA REPAIR;  Surgeon: Mihaela Lerma MD;  Location: [facility];  Service: Bariatrics    UT TOTAL HIP ARTHROPLASTY Right 4/27/2022    Procedure: ARTHROPLASTY HIP TOTAL ANTERIOR;  Surgeon: Sandy Briscoe MD;  [facility];  Service: Orthopedics    TOOTH EXTRACTION      TOTAL HIP ARTHROPLASTY Left          Physical Exam:      There were no vitals taken for this visit      General Appearance: no distress, conversive  HEENT: PERRLA, conjuctiva normal; oropharynx clear; mucous membranes moist;   Neck:  Supple, no lymphadenopathy or thyromegaly  Lungs: CTA, normal respiratory effort, no retractions, expiratory effort normal  CV: regular rate and rhythm , PMI normal   ABD: soft non tender, no masses , no hepatic or splenomegaly  EXT: DP pulses intact, no lymphadenopathy, no edema ;  right HIP dressing in place  Skin: normal turgor, normal texture, no rash  Psych: affect normal, mood normal  Neuro: AAOx3      Time of visit including pre-visit chart review, visit and post-visit coordination of plan and care , review of pre-surgical lab work, preparation and time spent documenting note in electronic medical record, time spent face-to-face in physical examination answering patient questions: 30 minutes       [address], [address]

## 2022-05-06 ENCOUNTER — OFFICE VISIT (OUTPATIENT)
Dept: PHYSICAL THERAPY | Facility: REHABILITATION | Age: 42
End: 2022-05-06
Payer: COMMERCIAL

## 2022-05-06 DIAGNOSIS — G89.29 CHRONIC HIP PAIN, RIGHT: ICD-10-CM

## 2022-05-06 DIAGNOSIS — M16.11 PRIMARY OSTEOARTHRITIS OF ONE HIP, RIGHT: Primary | ICD-10-CM

## 2022-05-06 DIAGNOSIS — Z96.641 STATUS POST TOTAL HIP REPLACEMENT, RIGHT: ICD-10-CM

## 2022-05-06 DIAGNOSIS — M25.551 CHRONIC HIP PAIN, RIGHT: ICD-10-CM

## 2022-05-06 PROCEDURE — 97110 THERAPEUTIC EXERCISES: CPT

## 2022-05-06 PROCEDURE — 97140 MANUAL THERAPY 1/> REGIONS: CPT

## 2022-05-06 PROCEDURE — 97112 NEUROMUSCULAR REEDUCATION: CPT

## 2022-05-06 NOTE — PROGRESS NOTES
Daily Note     Today's date: 2022  Patient name: Abdias Petersen  : 1980  MRN: [id number]  Referring provider: Gage Lawson MD  Dx:   Encounter Diagnosis     ICD-10-CM    1  Primary osteoarthritis of one hip, right  M16 11    2  Chronic hip pain, right  M25 551     G89 29    3  Status post total hip replacement, right  Z96 641        Start Time: 1016  Stop Time: 1109  Total time in clinic (min): 53 minutes    Subjective: Pt reports ongoing lateral hip "pain and numbness" since surgery  Most difficulty with descending stairs due to difficulty "sticking the right leg out when coming down "       Objective: See treatment diary below      Assessment: Tolerated treatment well  Pt c/o slight lat hip "discomfort" with bridges, subsided with rest  Pt able to demonstrate good technique and balance with SPC ambulation today  Discussed progression from  to [facility] with pt, verbalizes understanding  Educated pt on descending steps difficulty from decreased hip flexor strength, added AAROM hip flexion in supine today with good tolerance  Pt No c/o pain post-tx, and pt reports LE fatigue  Pt to f/u with surgeon next week  Continue to progress as tolerated  Patient would benefit from continued PT  1:1 with Arelis Morris DPT for entirety of tx  Plan: Continue per plan of care        Precautions: s/p R STEPHANIE 22, ant hip precautions       Manuals ///          R hip PROM w/in precaution  obj testing CM 8'                                                 Neuro Re-Ed             Quad set 5"x10 hep 5"x10 5"x10          glute set 5"x10 hep 5"x10           Heel slides with strap 10"x5 hep 2x10 active 2x10 active          Ankle pumps hep            Mini squats  2x10 x10          Add ball squeeze  3" 2x10 hep dc          Supine march   2x10 +PT assist          Ther Ex             NS   10' L5          Bridge  2x8 hep 2x10 +add ball          Ham curls  2x10 hep 2x10 # NV         LAQ  2x8 hep           SAQ 2x8 3" 2x10  # NV         TR  2x10 2x10          Wall slides +glute set   3"x10          STS   2x10 HL table                       Ther Activity                          Step ups             Gait Training             AD training   SPC 200ft                       Modalities

## 2022-05-09 ENCOUNTER — OFFICE VISIT (OUTPATIENT)
Dept: PHYSICAL THERAPY | Facility: REHABILITATION | Age: 42
End: 2022-05-09
Payer: COMMERCIAL

## 2022-05-09 DIAGNOSIS — M25.551 CHRONIC HIP PAIN, RIGHT: ICD-10-CM

## 2022-05-09 DIAGNOSIS — G89.29 CHRONIC HIP PAIN, RIGHT: ICD-10-CM

## 2022-05-09 DIAGNOSIS — M16.11 PRIMARY OSTEOARTHRITIS OF ONE HIP, RIGHT: Primary | ICD-10-CM

## 2022-05-09 DIAGNOSIS — Z96.641 STATUS POST TOTAL HIP REPLACEMENT, RIGHT: ICD-10-CM

## 2022-05-09 PROCEDURE — 97110 THERAPEUTIC EXERCISES: CPT

## 2022-05-09 PROCEDURE — 97112 NEUROMUSCULAR REEDUCATION: CPT

## 2022-05-09 PROCEDURE — 97140 MANUAL THERAPY 1/> REGIONS: CPT

## 2022-05-09 NOTE — PROGRESS NOTES
Daily Note     Today's date: 2022  Patient name: Jia Stoll  : 1980  MRN: [id number]  Referring provider: Naty Hayes MD  Dx:   Encounter Diagnosis     ICD-10-CM    1  Primary osteoarthritis of one hip, right  M16 11    2  Chronic hip pain, right  M25 551     G89 29    3  Status post total hip replacement, right  Z96 641        Start Time: 1016  Stop Time: 1100  Total time in clinic (min): 44 minutes    Subjective: Pt reports ongoing ache, but no significant pain  Pt is ambulating with SPC today with good tolerance, and improved functional independence  Objective: See treatment diary below      Assessment: Tolerated treatment well  Pt able to progress several exercises today with good tolerance  Hip flexion in supine improving, continues to have discomfort during standing hip flexion  Monitor response to progressions at f/u  Pt to f/u with ortho on Wednesday  Patient would benefit from continued PT  1:1 with Thania Roberson DPT for entirety of tx  Plan: Continue per plan of care        Precautions: s/p R STEPHANIE 22, ant hip precautions       Manuals /         R hip PROM w/in precaution  obj testing CM 8' CM 8'                                                Neuro Re-Ed             Quad set 5"x10 hep 5"x10 5"x10 5"x10         glute set 5"x10 hep 5"x10           Heel slides with strap 10"x5 hep 2x10 active 2x10 active 2x10 active         Ankle pumps hep            Mini squats  2x10 x10 x10         Add ball squeeze  3" 2x10 hep dc          Supine march   2x10 +PT assist 2x10 +PT assist         Ther Ex             NS   10' L5 10' L5         Bridge  2x8 hep 2x10 +add ball 3x10 +add ball         Ham curls  2x10 hep 2x10 3x10 2#         LAQ  2x8 hep  2x10 2#         SAQ  2x8 3" 2x10  3" 3x10 2#         TR  2x10 2x10 dc         Wall slides +glute set   3"x10 dc         STS   2x10 HL table 2x10 HL table                      Ther Activity                          Step ups 2x8 R 4"         Gait Training             AD training   SPC 200ft                       Modalities

## 2022-05-11 ENCOUNTER — HOSPITAL ENCOUNTER (OUTPATIENT)
Dept: RADIOLOGY | Facility: HOSPITAL | Age: 42
Discharge: HOME | End: 2022-05-11
Attending: ORTHOPAEDIC SURGERY
Payer: COMMERCIAL

## 2022-05-11 ENCOUNTER — OFFICE VISIT (OUTPATIENT)
Dept: OBGYN CLINIC | Facility: CLINIC | Age: 42
End: 2022-05-11

## 2022-05-11 DIAGNOSIS — Z96.641 S/P HIP REPLACEMENT, RIGHT: ICD-10-CM

## 2022-05-11 DIAGNOSIS — Z96.641 S/P HIP REPLACEMENT, RIGHT: Primary | ICD-10-CM

## 2022-05-11 PROCEDURE — 99024 POSTOP FOLLOW-UP VISIT: CPT | Performed by: ORTHOPAEDIC SURGERY

## 2022-05-11 PROCEDURE — 73502 X-RAY EXAM HIP UNI 2-3 VIEWS: CPT

## 2022-05-11 NOTE — PROGRESS NOTES
42 y o female presents for 2 weeks postoperative visit status post right  Anterior STEPHANIE   Patient states she is doing well she is taking only 10 oxycodone pills and is transition to Tylenol only at night for pain control  She is taking her aspirin as prescribed for DVT prophylaxis  Patient denies any calf pain chest pain shortness of breath numbness tingling  Patient stable with assistance of a cane doing very well physical therapy      Review of Systems  Review of systems negative unless otherwise specified in HPI    Past Medical History  Past Medical History:   Diagnosis Date    Arthritis     Right hip    Asthma     allergy induced    Environmental allergies     dust    GERD (gastroesophageal reflux disease)     Migraine with aura     Osteoarthritis     PONV (postoperative nausea and vomiting)     Seasonal allergies     Wears contact lenses     wears at night       Past Surgical History  Past Surgical History:   Procedure Laterality Date    ANKLE SURGERY Left     ANKLE STABALIZATION    CHOLECYSTECTOMY  2015    COLONOSCOPY      JOINT REPLACEMENT  March 2013    Total left hip replacement    DC LAP, DOMINGO RESTRICT PROC, LONGITUDINAL GASTRECTOMY N/A 3/2/2021    Procedure: ROBOTIC SLEEVE GASTRECTOMY; ROBOTIC PARAESOPHAGEAL HERNIA REPAIR;  Surgeon: Franky Cage MD;  Location: [facility];  Service: Bariatrics    DC TOTAL HIP ARTHROPLASTY Right 4/27/2022    Procedure: ARTHROPLASTY HIP TOTAL ANTERIOR;  Surgeon: Roshni Brennan MD;  Location: [facility];  Service: Orthopedics    TOOTH EXTRACTION      TOTAL HIP ARTHROPLASTY Left        Current Medications  Current Outpatient Medications on File Prior to Visit   Medication Sig Dispense Refill    acetaminophen (TYLENOL) 650 mg CR tablet Take 1 tablet (650 mg total) by mouth every 8 (eight) hours as needed for mild pain 30 tablet 0    ascorbic acid (VITAMIN C) 500 MG tablet Take 1 tablet (500 mg total) by mouth 2 (two) times a day 60 tablet 0    aspirin (ECOTRIN LOW STRENGTH) 81 mg EC tablet Take 1 tablet (81 mg total) by mouth 2 (two) times a day Please do not start taking until after total joint arthroplasty 70 tablet 0    Cetirizine HCl (ZYRTEC PO) Take 10 mg by mouth daily       etonogestrel-ethinyl estradiol (NuvaRing) 0 12-0 015 MG/24HR vaginal ring Insert 1 each into the vagina every 28 days 4 each 3    fluocinolone (SYNALAR) 0 025 % ointment daily at bedtime Around the mouth  2    Misc Natural Products (Turmeric Curcumin) CAPS Take by mouth in the morning        Multiple Vitamins-Minerals (multivitamin with minerals) tablet Take 1 tablet by mouth daily 30 tablet 0    [facility] Aspirin Low Dose 81 MG EC tablet       docusate sodium (COLACE) 100 mg capsule Take 1 capsule (100 mg total) by mouth 2 (two) times a day 10 capsule 0    folic acid (FOLVITE) 1 mg tablet Take 1 tablet (1 mg total) by mouth daily 30 tablet 0    HYDROcodone Bit-Homatrop MBr (Hydromet) 5-1 5 mg/5 mL syrup Hydromet 5 mg-1 5 mg/5 mL oral syrup      oxyCODONE (ROXICODONE) 5 immediate release tablet Take 1 tablets every 6 hrs as needed for pain control 30 tablet 0     No current facility-administered medications on file prior to visit         Recent Labs [facility]  0   Lab Value Date/Time    HCT 40 1 03/30/2022 1703    HGB 13 1 03/30/2022 1703    WBC 7 19 03/30/2022 1703    INR 0 96 03/30/2022 1703    HGBA1C 5 5 03/30/2022 1703         Physical exam  · General: Awake, Alert, Oriented  · Eyes: Pupils equal, round and reactive to light  · Heart: regular rate and rhythm  · Lungs: No audible wheezing    right Lower extremity  Numbness patient's right lower extremity incisions well approximated no erythema tenderness to palpation active flexion 80 degrees calf nontender palpation active hip flexion adduction abduction strength mild decreased sensation over lateral femoral cutaneous nerve distribution otherwise sensation tacked distal pulses present    Imaging  X-ray images of the right hip reviewed x-rays reveal well located right total hip arthroplasty without evidence of loosening excellent alignment    Assessment:   Status post 2 weeks right anterior total hip arthroplasty   Plan:  Weightbearing as tolerated lower extremity   Anterior total hip precautions  Continue aspirin for DVT prophylaxis x3 5 days from date of surgery   Advised patient keep incision clean and dry no soaking incisions x6 weeks from date of surgery   Over-the-counter pain medication as needed  Follow-up in 2 months time repeat x-rays right hip

## 2022-05-11 NOTE — LETTER
May 11, 2022     Patient: Orem Lung  YOB: 1980  Date of Visit: 5/11/2022      To Whom it May Concern:    Karli Mcmillan is under my professional care  Tunde Juan was seen in my office on 5/11/2022  Tunde Juan may return to work on June 13, 2022  If you have any questions or concerns, please don't hesitate to call  Sincerely,          Theodore Broderick MD        CC: Mady Lafleur

## 2022-05-13 ENCOUNTER — OFFICE VISIT (OUTPATIENT)
Dept: PHYSICAL THERAPY | Facility: REHABILITATION | Age: 42
End: 2022-05-13
Payer: COMMERCIAL

## 2022-05-13 DIAGNOSIS — Z96.641 STATUS POST TOTAL HIP REPLACEMENT, RIGHT: ICD-10-CM

## 2022-05-13 DIAGNOSIS — M25.551 CHRONIC HIP PAIN, RIGHT: ICD-10-CM

## 2022-05-13 DIAGNOSIS — M16.11 PRIMARY OSTEOARTHRITIS OF ONE HIP, RIGHT: Primary | ICD-10-CM

## 2022-05-13 DIAGNOSIS — G89.29 CHRONIC HIP PAIN, RIGHT: ICD-10-CM

## 2022-05-13 PROCEDURE — 97112 NEUROMUSCULAR REEDUCATION: CPT

## 2022-05-13 PROCEDURE — 97110 THERAPEUTIC EXERCISES: CPT

## 2022-05-13 PROCEDURE — 97530 THERAPEUTIC ACTIVITIES: CPT

## 2022-05-13 NOTE — PROGRESS NOTES
Daily Note     Today's date: 2022  Patient name: Jimbo Sheffield  : 1980  MRN: [id number]  Referring provider: David Castro MD  Dx:   Encounter Diagnosis     ICD-10-CM    1  Primary osteoarthritis of one hip, right  M16 11    2  Chronic hip pain, right  M25 551     G89 29    3  Status post total hip replacement, right  Z96 641                   Subjective: Pt reports f/u with surgeon went well, had dressing removed  Pt reports she's practiced a few stairs at home with good tolerance ascending with RLE  Objective: See treatment diary below      Assessment: Tolerated treatment well  Pt progressing improved strength overall  Pt progressed hip flexion strength during step ups, educated on performing at home  Continue to progress as tolerated  Patient would benefit from continued PT  1:1 with Giorgi Lewis, DPT for entirety of tx  Plan: Continue per plan of care        Precautions: s/p R STEPHANIE 22, ant hip precautions       Manuals / 5//6         R hip PROM w/in precaution  obj testing CM 8' CM 8' CM 2' >90 flexion NV                                              Neuro Re-Ed             Quad set 5"x10 hep 5"x10 5"x10 5"x10 5"x10        glute set 5"x10 hep 5"x10           Heel slides with strap 10"x5 hep 2x10 active 2x10 active 2x10 active dc        Ankle pumps hep            Mini squats  2x10 x10 x10 x10        Add ball squeeze  3" 2x10 hep dc          Supine march   2x10 +PT assist 2x10 +PT assist 3x8 active        SLS     30"x3        Ecc step downs             Ther Ex             NS   10' L5 10' L5 10' L6 LE only        Bridge  2x8 hep 2x10 +add ball 3x10 +add ball 3x10 +add ball 5#        Ham curls  2x10 hep 2x10 3x10 2# 3x10 3#        LAQ  2x8 hep  2x10 2# 2x10 3#        SAQ  2x8 3" 2x10  3" 3x10 2# 3" 3x10 3#        TR  2x10 2x10 dc         Wall slides +glute set   3"x10 dc         STS   2x10 HL table 2x10 HL table 3x10 HL table 5# Chair NV                    Ther Activity             Side stepping     x4 laps light otb        Step ups    2x8 R 4" 2x10 R 4" with step    x10 6" no step        Gait Training             AD training   SPC 200ft                       Modalities

## 2022-05-16 ENCOUNTER — EVALUATION (OUTPATIENT)
Dept: PHYSICAL THERAPY | Facility: REHABILITATION | Age: 42
End: 2022-05-16
Payer: COMMERCIAL

## 2022-05-16 DIAGNOSIS — M16.11 PRIMARY OSTEOARTHRITIS OF ONE HIP, RIGHT: Primary | ICD-10-CM

## 2022-05-16 DIAGNOSIS — M25.551 CHRONIC HIP PAIN, RIGHT: ICD-10-CM

## 2022-05-16 DIAGNOSIS — Z96.641 STATUS POST TOTAL HIP REPLACEMENT, RIGHT: ICD-10-CM

## 2022-05-16 DIAGNOSIS — G89.29 CHRONIC HIP PAIN, RIGHT: ICD-10-CM

## 2022-05-16 PROCEDURE — 97530 THERAPEUTIC ACTIVITIES: CPT

## 2022-05-16 PROCEDURE — 97112 NEUROMUSCULAR REEDUCATION: CPT

## 2022-05-16 PROCEDURE — 97164 PT RE-EVAL EST PLAN CARE: CPT

## 2022-05-16 PROCEDURE — 97110 THERAPEUTIC EXERCISES: CPT

## 2022-05-16 NOTE — PROGRESS NOTES
PT Re-Evaluation    Today's date: 2022  Patient name: Satish Alejandre  : 1980  MRN: [id number]  Referring provider: Sal Anand MD  Dx:   Encounter Diagnosis     ICD-10-CM    1  Primary osteoarthritis of one hip, right  M16 11    2  Chronic hip pain, right  M25 551     G89 29    3  Status post total hip replacement, right  Z96 641        Start Time: 1015  Stop Time: 1100  Total time in clinic (min): 45 minutes    Subjective: Pt reports making improvement since beginning PT  Pt has now progressed from  to [facility]  I did laundry this weekend, which is the first time in weeks  Pt still has difficulty lifting leg up into car, but notes stairs is improving  Pt reports she is 60% back to baseline  "I want to be more active "  Pt reports she sleeps in hooklying, unable to sleep in supine  Pain  Current pain ratin  At best pain ratin  At worst pain rating: 3  Quality: dull ache  Relieving factors: change in position and heat (sitting)  Aggravating factors: walking  Progression: improving      Objective: See treatment diary below    Range of Motion: Goniometric revealed the following findings (in degrees)  Joint Motion Right:  Left:    Hip Flex 100 + Pain WFL   Hip abd 15 WFL   Hip External Rotation NT (precautions) WFL   Hip Internal Rotation 15 deg  WFL   Knee Extension 0 WFL   Knee Flexion [facility]   Ankle Dorsiflexion [facility]   Ankle Plantarflexion [facility] WFL     Strength: MMT revealed the following findings    Joint Motion Right:  Left:    Hip Flexion 3/5 +pain 5/5   Hip Abduction 3/5 +pain 4/5   Hip IR 4-/5 5/5   Hip ER 4-/5 +pain 5/5   Hip Extension 4-/5 5/5   Knee Extension 5/5 5/5   Knee Flexion 5/5 5/5   Ankle Plantarflexion 5/5 5/5   Ankle Dorsiflexion 5/5 5/5     Additional Assessments:  Palpation: not TTP globally   Observation: poor standing posture, as noted above  Gait Pattern: dec stance on RLE, SPC utilized   Balance: SLS R: 30 sec  Joint Mobility: NT  TU 4 sec        Assessment  Assessment details: Jovanna Schmitz is a 42y o  year old female with a referred dx of chronic R hip pain, R hip OA, and s/p total hip replacement  Pt is s/p R STEPHANIE on 4/27/22 with Dr Ned Mcginnis  Pt is demonstrating improved fx strength overall compared to immediately post-op, and improving ROM  Pt continues to have pain at end range flexion, decreased strength compared to baseline, and ambulating with more restrictive AD compared to baseline  Pt would continue to benefit from skilled OP PT to address these, and the below impairments in order to optimize surgical outcomes and promote return to functional baseline  Pt to be RE following surgery  Impairments: abnormal gait, abnormal muscle firing, abnormal muscle tone, abnormal or restricted ROM, abnormal movement, activity intolerance, impaired balance, impaired physical strength, lacks appropriate home exercise program, pain with function, weight-bearing intolerance, poor posture  and poor body mechanics    Goals    Short Term Goals: In 4 weeks, the patient will:  1  Decrease worst pain by 2 weeks for improved QOL  - MET  2  Verbalize and maintain precautions for best surgical outcomes  - MET   3  Supervision with HEP for self care  - MET    Long Term Goals: In 8 weeks, the patient will:  1  Improve LE strength by 1/2 grade for improved activity tolerance  - MET  2  FOTO to greater than predicted value  - ongoing  3  Independent with comprehensive HEP upon discharge  - not met  4  Improve TUG testing by 3 seconds for decreased fall risk  - MET  5  Progress AD for improved dynamic balance and facilitation of return to PLOF  - MET  6  Improve SLS to [facility] for decreased fall risk  - MET  7  Ambulate without AD for return to baseline fx  - NEW  8  Return strength to [facility] for improved LE fx  - NEW  9  Improve hip flexion ROM to [facility] for improved tolerance to fx ROM  - NEW  10   Demonstrating independence with actively lifting LE into car to meet pt's goal  - NEW        Plan: Continue per plan of care        Patient would benefit from: skilled physical therapy  Referral necessary: No  Planned modality interventions: cryotherapy  Planned therapy interventions: activity modification, ADL retraining, manual therapy, balance, body mechanics training, balance/weight bearing training, neuromuscular re-education, patient education, postural training, strengthening, stretching, therapeutic activities, therapeutic exercise, home exercise program, graded exercise, graded activity, gait training, functional ROM exercises and flexibility  Frequency: 2x week  Duration in weeks: 8  Plan of Care beginning date: 5/16/2022  Treatment plan discussed with: patient        Pertinent Findings and Outcome Measures:                                                                                                                                                                         Test / Measure  4/5/2022 5/2/2022 5/16/2022    FOTO 49 33 65    Hip abd MMT 3/5 3-/5 3/5    Hip flex MMT 4-/5 3-/5 3+/5    TUG 9 9 sec 28 4 sec 9 4 sec      Precautions: s/p R STEPHANIE 4/27/22, ant hip precautions       Manuals 4/5 5/2 5/6 5/9 5/13 5/16       R hip PROM w/in precaution  obj testing CM 8' CM 8' CM 2' CM 2'                                              Neuro Re-Ed             Quad set 5"x10 hep 5"x10 5"x10 5"x10 5"x10 np       glute set 5"x10 hep 5"x10           Heel slides with strap 10"x5 hep 2x10 active 2x10 active 2x10 active dc        Ankle pumps hep            Mini squats  2x10 x10 x10 x10 NV       Add ball squeeze  3" 2x10 hep dc          Supine march   2x10 +PT assist 2x10 +PT assist 3x8 active 3x10       SLS     30"x3 30"x3 Foam NV      Ecc step downs             DL             Ther Ex             NS   10' L5 10' L5 10' L6 LE only 10' L6 LE only       Bridge  2x8 hep 2x10 +add ball 3x10 +add ball 3x10 +add ball 5# 3x10 +add ball 8#       Ham curls  2x10 hep 2x10 3x10 2# 3x10 3# dc LAQ  2x8 hep  2x10 2# 2x10 3# dc       SAQ  2x8 3" 2x10  3" 3x10 2# 3" 3x10 3# dc       TR  2x10 2x10 dc         Wall slides +glute set   3"x10 dc         STS   2x10 HL table 2x10 HL table 3x10 HL table 5# 3x10 chair       Standing hip abd      2x10 3#       Ther Activity             Side stepping     x4 laps light otb NV       Step ups    2x8 R 4" 2x10 R 4" with step    x10 6" no step 2x10 6" FSU R LSU NV      Gait Training             AD training   SPC 200ft   NV no SPC                    Modalities

## 2022-05-20 ENCOUNTER — OFFICE VISIT (OUTPATIENT)
Dept: PHYSICAL THERAPY | Facility: REHABILITATION | Age: 42
End: 2022-05-20
Payer: COMMERCIAL

## 2022-05-20 DIAGNOSIS — G89.29 CHRONIC HIP PAIN, RIGHT: ICD-10-CM

## 2022-05-20 DIAGNOSIS — M25.551 CHRONIC HIP PAIN, RIGHT: ICD-10-CM

## 2022-05-20 DIAGNOSIS — Z96.641 STATUS POST TOTAL HIP REPLACEMENT, RIGHT: ICD-10-CM

## 2022-05-20 DIAGNOSIS — M16.11 PRIMARY OSTEOARTHRITIS OF ONE HIP, RIGHT: Primary | ICD-10-CM

## 2022-05-20 PROCEDURE — 97110 THERAPEUTIC EXERCISES: CPT

## 2022-05-20 PROCEDURE — 97530 THERAPEUTIC ACTIVITIES: CPT

## 2022-05-20 PROCEDURE — 97112 NEUROMUSCULAR REEDUCATION: CPT

## 2022-05-20 NOTE — PROGRESS NOTES
Daily Note     Today's date: 2022  Patient name: Tahir Aguilar  : 1980  MRN: [id number]  Referring provider: Flavia Grossman MD  Dx:   Encounter Diagnosis     ICD-10-CM    1  Primary osteoarthritis of one hip, right  M16 11    2  Chronic hip pain, right  M25 551     G89 29    3  Status post total hip replacement, right  Z96 641        Start Time: 1023  Stop Time: 1101  Total time in clinic (min): 38 minutes    Subjective: "I was very sore the day after last session from the sit to stands on the chair  Objective: See treatment diary below      Assessment: Tolerated treatment well  Added foam back to STS due to DOMS following last tx  Pt able to tolerate all exercise progressions well despite soreness in quad from previous session  Educated pt on continuing current 2x/week frequency until gaining independence with HEP before decrease in frequency, pt verbalizes understanding  Monitor response at f/u  Patient would benefit from continued PT  1:1 with Nona Bravo DPT for entirety of tx  Plan: Continue per plan of care        Pertinent Findings and Outcome Measures:                                                                                                                                                                         Test / Measure  2022    FOTO 49 33 65    Hip abd MMT 3/5 3-/5 3/5    Hip flex MMT 4-/5 3-/5 3+/5    TUG 9 9 sec 28 4 sec 9 4 sec      Precautions: s/p R STEPHANIE 22, ant hip precautions       Manuals / 5/2 5/6 5/9 5/13 16       R hip PROM w/in precaution  obj testing CM 8' CM 8' CM 2' CM 2' CM 2' >90 deg                                             Neuro Re-Ed             Quad set 5"x10 hep 5"x10 5"x10 5"x10 5"x10 np       glute set 5"x10 hep 5"x10           Heel slides with strap 10"x5 hep 2x10 active 2x10 active 2x10 active dc        Ankle pumps hep            Mini squats  2x10 x10 x10 x10 NV x10      Add ball squeeze  3" 2x10 hep dc          Supine march   2x10 +PT assist 2x10 +PT assist 3x8 active 3x10 3x10      SLS     30"x3 30"x3 Foam 30"x3      Ecc step downs       NV      DL       3x10 15# KB      Ther Ex             NS   10' L5 10' L5 10' L6 LE only 10' L6 LE only 10' L6 LE only      Bridge  2x8 hep 2x10 +add ball 3x10 +add ball 3x10 +add ball 5# 3x10 +add ball 8# dc      Ham curls  2x10 hep 2x10 3x10 2# 3x10 3# dc       LAQ  2x8 hep  2x10 2# 2x10 3# dc       SAQ  2x8 3" 2x10  3" 3x10 2# 3" 3x10 3# dc       TR  2x10 2x10 dc         Wall slides +glute set   3"x10 dc         STS   2x10 HL table 2x10 HL table 3x10 HL table 5# 3x10 chair 2x10 chair+ foam      Standing hip abd      2x10 3# 3x10 5#      Ther Activity             Side stepping     x4 laps light otb NV x5 laps otb      Step ups    2x8 R 4" 2x10 R 4" with step    x10 6" no step 2x10 6" FSU R x10 FSU 6" R    2x10 LSU 6" R      Gait Training             AD training   SPC 200ft   NV no SPC 100ft x2 no AD                   Modalities

## 2022-05-24 ENCOUNTER — OFFICE VISIT (OUTPATIENT)
Dept: PHYSICAL THERAPY | Facility: REHABILITATION | Age: 42
End: 2022-05-24
Payer: COMMERCIAL

## 2022-05-24 DIAGNOSIS — Z96.641 STATUS POST TOTAL HIP REPLACEMENT, RIGHT: ICD-10-CM

## 2022-05-24 DIAGNOSIS — G89.29 CHRONIC HIP PAIN, RIGHT: ICD-10-CM

## 2022-05-24 DIAGNOSIS — M25.551 CHRONIC HIP PAIN, RIGHT: ICD-10-CM

## 2022-05-24 DIAGNOSIS — M16.11 PRIMARY OSTEOARTHRITIS OF ONE HIP, RIGHT: Primary | ICD-10-CM

## 2022-05-24 PROCEDURE — 97110 THERAPEUTIC EXERCISES: CPT | Performed by: PHYSICAL THERAPIST

## 2022-05-24 PROCEDURE — 97530 THERAPEUTIC ACTIVITIES: CPT | Performed by: PHYSICAL THERAPIST

## 2022-05-24 PROCEDURE — 97112 NEUROMUSCULAR REEDUCATION: CPT | Performed by: PHYSICAL THERAPIST

## 2022-05-24 NOTE — PROGRESS NOTES
Daily Note     Today's date: 2022  Patient name: Kerri Gee  : 1980  MRN: [id number]  Referring provider: Chilo Smith MD  Dx:   No diagnosis found  Subjective: The patient reports no new complaints today and denies any pain  No issues after last session  She states she still has difficulty getting into the  seat of a car  Objective: See treatment diary below      Assessment: The patient exhibited good gait mechanics and stability without SPC  Instructed the patient to discontinue use of SPC in the house but to continue to bring the cane when ambulating in the community  Fair control during eccentric step downs  The patient was appropriately challenged with her current program exhibiting fatigue at the end of the session  Plan: Continue per plan of care        Pertinent Findings and Outcome Measures:                                                                                                                                                                         Test / Measure  2022    FOTO 49 33 65    Hip abd MMT 3/5 3-/5 3/5    Hip flex MMT 4-/5 3-/5 3+/5    TUG 9 9 sec 28 4 sec 9 4 sec      Precautions: s/p R STEPHANIE 22, ant hip precautions       Manuals  5/6 5/     R hip PROM w/in precaution  obj testing CM 8' CM 8' CM 2' CM 2' CM 2' >90 deg NB 2' >90 deg                                            Neuro Re-Ed             Quad set 5"x10 hep 5"x10 5"x10 5"x10 5"x10 np       glute set 5"x10 hep 5"x10           Heel slides with strap 10"x5 hep 2x10 active 2x10 active 2x10 active dc        Ankle pumps hep            Mini squats  2x10 x10 x10 x10 NV x10 x10     Add ball squeeze  3" 2x10 hep dc          Supine march   2x10 +PT assist 2x10 +PT assist 3x8 active 3x10 3x10 3x10     SLS     30"x3 30"x3 Foam 30"x3 Foam 30"x3     Ecc step downs       NV 6" 2x10     DL       3x10 15# KB 3x10 15# KB     Ther Ex NS   10' L5 10' L5 10' L6 LE only 10' L6 LE only 10' L6 LE only 10' L6 LE only     Bridge  2x8 hep 2x10 +add ball 3x10 +add ball 3x10 +add ball 5# 3x10 +add ball 8# dc      Ham curls  2x10 hep 2x10 3x10 2# 3x10 3# dc       LAQ  2x8 hep  2x10 2# 2x10 3# dc       SAQ  2x8 3" 2x10  3" 3x10 2# 3" 3x10 3# dc       TR  2x10 2x10 dc         Wall slides +glute set   3"x10 dc         STS   2x10 HL table 2x10 HL table 3x10 HL table 5# 3x10 chair 2x10 chair+ foam 2x10 chair+foam     Standing hip abd      2x10 3# 3x10 5# 3x10 5#     Ther Activity             Side stepping     x4 laps light otb NV x5 laps otb x5 laps gtb     Step ups    2x8 R 4" 2x10 R 4" with step    x10 6" no step 2x10 6" FSU R x10 FSU 6" R    2x10 LSU 6" R x10 FSU 6" R  2x10 LSU 6" R     Gait Training             AD training   SPC 200ft   NV no SPC 100ft x2 no AD 100ft x2 no AD                  Modalities

## 2022-05-27 ENCOUNTER — EVALUATION (OUTPATIENT)
Dept: PHYSICAL THERAPY | Facility: REHABILITATION | Age: 42
End: 2022-05-27
Payer: COMMERCIAL

## 2022-05-27 DIAGNOSIS — M16.11 PRIMARY OSTEOARTHRITIS OF ONE HIP, RIGHT: Primary | ICD-10-CM

## 2022-05-27 DIAGNOSIS — Z96.641 STATUS POST TOTAL HIP REPLACEMENT, RIGHT: ICD-10-CM

## 2022-05-27 DIAGNOSIS — G89.29 CHRONIC HIP PAIN, RIGHT: ICD-10-CM

## 2022-05-27 DIAGNOSIS — M25.551 CHRONIC HIP PAIN, RIGHT: ICD-10-CM

## 2022-05-27 PROCEDURE — 97112 NEUROMUSCULAR REEDUCATION: CPT

## 2022-05-27 PROCEDURE — 97164 PT RE-EVAL EST PLAN CARE: CPT

## 2022-05-27 PROCEDURE — 97530 THERAPEUTIC ACTIVITIES: CPT

## 2022-05-27 PROCEDURE — 97110 THERAPEUTIC EXERCISES: CPT

## 2022-05-27 NOTE — PROGRESS NOTES
Daily Note     Today's date: 2022  Patient name: Jony Tapia  : 1980  MRN: [id number]  Referring provider: Paul Painting MD  Dx:   Encounter Diagnosis     ICD-10-CM    1  Primary osteoarthritis of one hip, right  M16 11    2  Chronic hip pain, right  M25 551     G89 29    3  Status post total hip replacement, right  Z96 641        Start Time:   Stop Time: 927  Total time in clinic (min): 49 minutes    Subjective: Pt reports continued improvement with PT  Pt notes she's been short distance walking without SPC  "Getting out of the shower is much easier," Pt notes continued difficulty with getting into passenger door of car and donning socks  "I was able to walk 1 6 miles yesterday, but is much slower than I usually do " Pt reports she is 70-80% back to fx baseline  Pain  Current pain ratin  At best pain ratin  At worst pain rating: 3  Quality: dull ache  Relieving factors: change in position and heat (sitting)  Aggravating factors: walking  Progression: improving      Objective: See treatment diary below    Range of Motion: Goniometric revealed the following findings (in degrees)  Joint Motion Right:  Left:    Hip Flex 108 + Pain WFL   Hip abd 15 WFL   Hip External Rotation NT (precautions) WFL   Hip Internal Rotation 40 deg  WFL   Knee Extension 0 WFL   Knee Flexion [facility]   Ankle Dorsiflexion [facility]   Ankle Plantarflexion [facility] WFL     Strength: MMT revealed the following findings    Joint Motion Right:  Left:    Hip Flexion 4-/5 +pain 5/5   Hip Abduction 3/5 +pain 4/5   Hip IR 4/5 5/5   Hip ER 4/5 +pain 5/5   Hip Extension 4-/5 (supine) 5/5   Knee Extension 5/5 5/5   Knee Flexion 5/5 5/5   Ankle Plantarflexion 5/5 5/5   Ankle Dorsiflexion 5/5 5/5     Additional Assessments:  Palpation: not TTP globally   Observation: no visible deformity   Gait Pattern: WFL, no SPC  Balance: SLS R: 30 sec  Joint Mobility: NT  TU 1 sec, no SPC          Assessment:  Jony Tapia is a 42 y o  year old female with a referred dx of chronic R hip pain, R hip OA, and s/p total hip replacement  Pt is s/p R STEPHANIE on 4/27/22 with Dr Fatemeh Castillo  Pt continues demonstrating improved fx strength overall, improving ROM, and improved tolerance to fx activity  Pt continues to have pain at end range flexion, decreased strength compared to baseline, and difficulty with getting into passenger side of car  Pt would continue to benefit from skilled OP PT to address these, and the below impairments in order to optimize surgical outcomes and promote return to functional baseline  Impairments: abnormal gait, abnormal muscle firing, abnormal muscle tone, abnormal or restricted ROM, abnormal movement, activity intolerance, impaired balance, impaired physical strength, lacks appropriate home exercise program, pain with function, weight-bearing intolerance, poor posture  and poor body mechanics    Goals    Short Term Goals: In 4 weeks, the patient will:  1  Decrease worst pain by 2 weeks for improved QOL  - MET  2  Verbalize and maintain precautions for best surgical outcomes  - MET   3  Supervision with HEP for self care  - MET    Long Term Goals: In 8 weeks, the patient will:  1  Improve LE strength by 1/2 grade for improved activity tolerance  - MET  2  FOTO to greater than predicted value  - ongoing  3  Independent with comprehensive HEP upon discharge  - not met  4  Improve TUG testing by 3 seconds for decreased fall risk  - MET  5  Progress AD for improved dynamic balance and facilitation of return to PLOF  - MET  6  Improve SLS to [facility] for decreased fall risk  - MET  7  Ambulate without AD for return to baseline fx  - MET  8  Return strength to [facility] for improved LE fx  - not met  9  Improve hip flexion ROM to [facility] for improved tolerance to fx ROM  - not met  10  Demonstrating independence with actively lifting LE into car to meet pt's goal  - not met        Plan: Continue per plan of care        Patient would benefit from: skilled physical therapy  Plan details: dec frequency to 1x/week due to pt gaining independence with HEP  Referral necessary: No  Planned modality interventions: cryotherapy  Planned therapy interventions: activity modification, ADL retraining, manual therapy, balance, body mechanics training, balance/weight bearing training, neuromuscular re-education, patient education, postural training, strengthening, stretching, therapeutic activities, therapeutic exercise, home exercise program, graded exercise, graded activity, gait training, functional ROM exercises and flexibility  Frequency: 1x week  Duration in weeks: 8  Plan of Care beginning date: 5/27/2022  Treatment plan discussed with: patient       Pertinent Findings and Outcome Measures:                                                                                                                                                                         Test / Measure  4/5/2022 5/2/2022 5/16/2022 5/27/2022   FOTO 49 33 65 -   Hip abd MMT 3/5 3-/5 3/5 3/5   Hip flex MMT 4-/5 3-/5 3+/5 4-/5   TUG 9 9 sec 28 4 sec 9 4 sec 7 1 sec, no SPC     Precautions: s/p R STEPHANIE 4/27/22, ant hip precautions       Manuals 4/5 5/2 5/6 5/9 5/13 5/16 5/20 5/24 5/27    R hip PROM w/in precaution  obj testing CM 8' CM 8' CM 2' CM 2' CM 2' >90 deg NB 2' >90 deg np                                           Neuro Re-Ed             Quad set 5"x10 hep 5"x10 5"x10 5"x10 5"x10 np       glute set 5"x10 hep 5"x10           Heel slides with strap 10"x5 hep 2x10 active 2x10 active 2x10 active dc        Ankle pumps hep            Mini squats  2x10 x10 x10 x10 NV x10 x10 x10    Add ball squeeze  3" 2x10 hep dc          Supine march   2x10 +PT assist 2x10 +PT assist 3x8 active 3x10 3x10 3x10 3x10    2x10 stand march hep    SLS     30"x3 30"x3 Foam 30"x3 Foam 30"x3 2x8 dynamic    Ecc step downs       NV 6" 2x10 6" 3x10    DL       3x10 15# KB 3x10 15# KB 3x10 20# KB    Ther Ex             NS   10' L5 10' L5 10' L6 LE only 10' L6 LE only 10' L6 LE only 10' L6 LE only 10' L6 LE only    Bridge  2x8 hep 2x10 +add ball 3x10 +add ball 3x10 +add ball 5# 3x10 +add ball 8# dc      Ham curls  2x10 hep 2x10 3x10 2# 3x10 3# dc       LAQ  2x8 hep  2x10 2# 2x10 3# dc       SAQ  2x8 3" 2x10  3" 3x10 2# 3" 3x10 3# dc       TR  2x10 2x10 dc         Wall slides +glute set   3"x10 dc         STS   2x10 HL table 2x10 HL table 3x10 HL table 5# 3x10 chair 2x10 chair+ foam 2x10 chair+foam 2x10 chair+foam hep    Standing hip abd      2x10 3# 3x10 5# 3x10 5# 3x10 7#    Ther Activity             Side stepping     x4 laps light otb NV x5 laps otb x5 laps gtb x6 laps gtb hep    Step ups    2x8 R 4" 2x10 R 4" with step    x10 6" no step 2x10 6" FSU R x10 FSU 6" R    2x10 LSU 6" R x10 FSU 6" R  2x10 LSU 6" R 2x10 FSU 6" R    2x10 LSU 6" R hep    Lat step overs         10" x8 B    Gait Training             AD training   SPC 200ft   NV no SPC 100ft x2 no AD 100ft x2 no AD                  Modalities

## 2022-06-01 ENCOUNTER — OFFICE VISIT (OUTPATIENT)
Dept: PHYSICAL THERAPY | Facility: REHABILITATION | Age: 42
End: 2022-06-01
Payer: COMMERCIAL

## 2022-06-01 DIAGNOSIS — G89.29 CHRONIC HIP PAIN, RIGHT: ICD-10-CM

## 2022-06-01 DIAGNOSIS — M25.551 CHRONIC HIP PAIN, RIGHT: ICD-10-CM

## 2022-06-01 DIAGNOSIS — Z96.641 STATUS POST TOTAL HIP REPLACEMENT, RIGHT: ICD-10-CM

## 2022-06-01 DIAGNOSIS — M16.11 PRIMARY OSTEOARTHRITIS OF ONE HIP, RIGHT: Primary | ICD-10-CM

## 2022-06-01 PROCEDURE — 97110 THERAPEUTIC EXERCISES: CPT

## 2022-06-01 PROCEDURE — 97530 THERAPEUTIC ACTIVITIES: CPT

## 2022-06-01 PROCEDURE — 97112 NEUROMUSCULAR REEDUCATION: CPT

## 2022-06-01 NOTE — PROGRESS NOTES
Daily Note     Today's date: 2022  Patient name: Jimbo Sheffield  : 1980  MRN: [id number]  Referring provider: David Castro MD  Dx:   Encounter Diagnosis     ICD-10-CM    1  Primary osteoarthritis of one hip, right  M16 11    2  Chronic hip pain, right  M25 551     G89 29    3  Status post total hip replacement, right  Z96 641        Start Time: 923  Stop Time: 1009  Total time in clinic (min): 46 minutes    Subjective: Pt reports she walked 5 blocks to [employer] without SPC with good tolerance  She notes       Objective: See treatment diary below      Assessment: Tolerated treatment well  Pt continues to progress several fx exercises with good tolerance overall  Slight pain increased with abrahan hip abd, transition back to ankle # NV  No increase in pain noted post-tx, but notes LE fatigue  Continues to progress as tolerated  Patient would benefit from continued PT  1:1 with Giorgi Lewis, DPT for entirety of tx  Plan: Continue per plan of care        Pertinent Findings and Outcome Measures:                                                                                                                                                                         Test / Measure  2022   FOTO 49 33 65 -   Hip abd MMT 3/5 3-/5 3/5 3/5   Hip flex MMT 4-/5 3-/5 3+/5 4-/5   TUG 9 9 sec 28 4 sec 9 4 sec 7 1 sec, no SPC     Precautions: s/p R STEPHANIE 22, ant hip precautions       Manuals / 5/2 5/6 5/ 6    R hip PROM w/in precaution  obj testing CM 8' CM 8' CM 2' CM 2' CM 2' >90 deg NB 2' >90 deg np                                               Neuro Re-Ed              Quad set 5"x10 hep 5"x10 5"x10 5"x10 5"x10 np        glute set 5"x10 hep 5"x10            Heel slides with strap 10"x5 hep 2x10 active 2x10 active 2x10 active dc         Ankle pumps hep             Mini squats  2x10 x10 x10 x10 NV x10 x10 x10 x10    Add ball squeeze  3" 2x10 hep dc           Supine march   2x10 +PT assist 2x10 +PT assist 3x8 active 3x10 3x10 3x10 3x10    2x10 stand march hep 2x10 stand march B    SLS     30"x3 30"x3 Foam 30"x3 Foam 30"x3 2x8 dynamic 2x8 dynamic    Ecc step downs       NV 6" 2x10 6" 3x10 6" 2x10    DL       3x10 15# KB 3x10 15# KB 3x10 20# KB 3x10 25# KB    Ther Ex              NS   10' L5 10' L5 10' L6 LE only 10' L6 LE only 10' L6 LE only 10' L6 LE only 10' L6 LE only 10' L7 LE only    STS   2x10 HL table 2x10 HL table 3x10 HL table 5# 3x10 chair 2x10 chair+ foam 2x10 chair+foam 2x10 chair+foam hep 2x10 chair    Standing hip abd      2x10 3# 3x10 5# 3x10 5# 3x10 7# 2x10 5# abrahan D/c abrahan                 Hip add          2x10 abrahan 7#    Leg press          2x10 85#    Ther Activity              Side stepping     x4 laps light otb NV x5 laps otb x5 laps gtb x6 laps gtb hep x6 laps btb    Step ups    2x8 R 4" 2x10 R 4" with step    x10 6" no step 2x10 6" FSU R x10 FSU 6" R    2x10 LSU 6" R x10 FSU 6" R  2x10 LSU 6" R 2x10 FSU 6" R    2x10 LSU 6" R hep 2x10 FSU 9" R    2x10 LSU 9" R    Lat step overs         10" x8 B 2x10 B 10"    Gait Training              AD training   SPC 200ft   NV no SPC 100ft x2 no AD 100ft x2 no AD                    Modalities

## 2022-06-10 ENCOUNTER — OFFICE VISIT (OUTPATIENT)
Dept: PHYSICAL THERAPY | Facility: REHABILITATION | Age: 42
End: 2022-06-10
Payer: COMMERCIAL

## 2022-06-10 DIAGNOSIS — M16.11 PRIMARY OSTEOARTHRITIS OF ONE HIP, RIGHT: Primary | ICD-10-CM

## 2022-06-10 DIAGNOSIS — M25.551 CHRONIC HIP PAIN, RIGHT: ICD-10-CM

## 2022-06-10 DIAGNOSIS — G89.29 CHRONIC HIP PAIN, RIGHT: ICD-10-CM

## 2022-06-10 DIAGNOSIS — Z96.641 STATUS POST TOTAL HIP REPLACEMENT, RIGHT: ICD-10-CM

## 2022-06-10 PROCEDURE — 97110 THERAPEUTIC EXERCISES: CPT

## 2022-06-10 PROCEDURE — 97112 NEUROMUSCULAR REEDUCATION: CPT

## 2022-06-10 PROCEDURE — 97530 THERAPEUTIC ACTIVITIES: CPT

## 2022-06-10 NOTE — PROGRESS NOTES
Daily Note     Today's date: 6/10/2022  Patient name: Jannie Flores  : 1980  MRN: [id number]  Referring provider: Alok Welch MD  Dx:   Encounter Diagnosis     ICD-10-CM    1  Primary osteoarthritis of one hip, right  M16 11    2  Chronic hip pain, right  M25 551     G89 29    3  Status post total hip replacement, right  Z96 641        Start Time: 09  Stop Time: 1029  Total time in clinic (min): 42 minutes    Subjective: Pt reports improving tolerance to HEP, continues to have difficulty with marching  Pt reports she went on 3 05 mile walk Monday with good tolreance and minimal fatigue/soreness  Pt returns to work next week  Objective: See treatment diary below      Assessment: Tolerated treatment well  Pt continues progressing functional strength well, able to add resistance to several functional exercises today  Pt reports fatigue post-tx and no pain  FOTO updated today  Patient would benefit from continued PT  1:1 with Keyana Israel DPT for entirety of tx  Plan: Continue per plan of care        Pertinent Findings and Outcome Measures:                                                                                                                                                                         Test / Measure  2022   FOTO 49 33 65 -   Hip abd MMT 3/5 3-/5 3/5 3/5   Hip flex MMT 4-/5 3-/5 3+/5 4-/5   TUG 9 9 sec 28 4 sec 9 4 sec 7 1 sec, no SPC     Precautions: s/p R STEPHANIE 22, ant hip precautions       Manuals 5/20 5/24 5/27 6/1 6/10   R hip PROM w/in precaution CM 2' >90 deg NB 2' >90 deg np                             Neuro Re-Ed        Mini squats x10 x10 x10 x10 x10   Add ball squeeze        Supine march 3x10 3x10 3x10    2x10 stand march hep 2x10 stand march B 2x10 stand march B   SLS Foam 30"x3 Foam 30"x3 2x8 dynamic 2x8 dynamic 2x8 dynamic bilateral   Ecc step downs NV 6" 2x10 6" 3x10 6" 2x10 6" 2x10   DL 3x10 15# KB 3x10 15# KB 3x10 20# KB 3x10 25# KB 2x15 25#   Lunges     2x10 tap bosu   Ther Ex        NS 10' L6 LE only 10' L6 LE only 10' L6 LE only 10' L7 LE only 10' L7 LE only   STS 2x10 chair+ foam 2x10 chair+foam 2x10 chair+foam hep 2x10 chair 3x10 chair 8#   Standing hip abd 3x10 5# 3x10 5# 3x10 7# 2x10 5# abrahan D/c abrahan           Hip add    2x10 abrahan 7# 2x10 abrahan 9#   Leg press    2x10 85# 3x10 85#   Ther Activity        Side stepping x5 laps otb x5 laps gtb x6 laps gtb hep x6 laps btb x6 laps btb   Step ups x10 FSU 6" R    2x10 LSU 6" R x10 FSU 6" R  2x10 LSU 6" R 2x10 FSU 6" R    2x10 LSU 6" R hep 2x10 FSU 9" R    2x10 LSU 9" R 2x10 FSU 9" R 8#    2x10 LSU 9" R 8#   Lat step overs   10" x8 B 2x10 B 10" np   Gait Training        AD training 100ft x2 no AD 100ft x2 no AD              Modalities

## 2022-06-15 DIAGNOSIS — Z98.818 OTHER DENTAL PROCEDURE STATUS: Primary | ICD-10-CM

## 2022-06-15 RX ORDER — AMOXICILLIN 500 MG/1
TABLET, FILM COATED ORAL
Qty: 16 TABLET | Refills: 0 | Status: SHIPPED | OUTPATIENT
Start: 2022-06-15 | End: 2022-08-15

## 2022-06-16 ENCOUNTER — OFFICE VISIT (OUTPATIENT)
Dept: PHYSICAL THERAPY | Facility: REHABILITATION | Age: 42
End: 2022-06-16
Payer: COMMERCIAL

## 2022-06-16 DIAGNOSIS — Z96.641 STATUS POST TOTAL HIP REPLACEMENT, RIGHT: ICD-10-CM

## 2022-06-16 DIAGNOSIS — G89.29 CHRONIC HIP PAIN, RIGHT: ICD-10-CM

## 2022-06-16 DIAGNOSIS — M25.551 CHRONIC HIP PAIN, RIGHT: ICD-10-CM

## 2022-06-16 DIAGNOSIS — M16.11 PRIMARY OSTEOARTHRITIS OF ONE HIP, RIGHT: Primary | ICD-10-CM

## 2022-06-16 PROCEDURE — 97112 NEUROMUSCULAR REEDUCATION: CPT

## 2022-06-16 PROCEDURE — 97110 THERAPEUTIC EXERCISES: CPT

## 2022-06-16 PROCEDURE — 97530 THERAPEUTIC ACTIVITIES: CPT

## 2022-06-16 NOTE — PROGRESS NOTES
Daily Note     Today's date: 2022  Patient name: Jannie Flores  : 1980  MRN: [id number]  Referring provider: Alok Welch MD  Dx:   Encounter Diagnosis     ICD-10-CM    1  Primary osteoarthritis of one hip, right  M16 11    2  Chronic hip pain, right  M25 551     G89 29    3  Status post total hip replacement, right  Z96 641        Start Time: 845  Stop Time: 926  Total time in clinic (min): 41 minutes    Subjective: Pt reports improving tolerance to HEP, continues to have difficulty with marching  Pt reports she went on 3 05 mile walk Monday with good tolreance and minimal fatigue/soreness  Pt returns to work next week  Objective: See treatment diary below      Assessment: Tolerated treatment well  Pt continues to progress strength throughout session, reports fatigue post-tx and no pain  Pt demonstrates continued limitation during hip flexion exercises secondary to "pinch" in ant hip  Patient would benefit from continued PT  1:1 with Keyana Israel DPT for entirety of tx  Plan: Continue per plan of care        Pertinent Findings and Outcome Measures:                                                                                                                                                                         Test / Measure  2022   FOTO 49 33 65 -   Hip abd MMT 3/5 3-/5 3/5 3/5   Hip flex MMT 4-/5 3-/5 3+/5 4-/5   TUG 9 9 sec 28 4 sec 9 4 sec 7 1 sec, no SPC     Precautions: s/p R STEPHANIE 22, ant hip precautions       Manuals  6/1 6/10 6/16   R hip PROM w/in precaution CM 2' >90 deg NB 2' >90 deg np                                 Neuro Re-Ed         Mini squats x10 x10 x10 x10 x10 x10   Add ball squeeze         Supine march 3x10 3x10 3x10    2x10 stand march hep 2x10 stand march B 2x10 stand march B 3x10 stand march R   SLS Foam 30"x3 Foam 30"x3 2x8 dynamic 2x8 dynamic 2x8 dynamic bilateral 2x10 dynamic bilateral   Ecc step downs NV 6" 2x10 6" 3x10 6" 2x10 6" 2x10 6" 2x10   DL 3x10 15# KB 3x10 15# KB 3x10 20# KB 3x10 25# KB 2x15 25# 2x15 25#   Lunges     2x10 tap bosu 2x10 tap x2 foams   Ther Ex         NS 10' L6 LE only 10' L6 LE only 10' L6 LE only 10' L7 LE only 10' L7 LE only 10' L8 LE only   STS 2x10 chair+ foam 2x10 chair+foam 2x10 chair+foam hep 2x10 chair 3x10 chair 8# 2x10 chair 10#   Standing hip abd 3x10 5# 3x10 5# 3x10 7# 2x10 5# abrahan D/c abrahan 2x10 8#            Hip add    2x10 abrahan 7# 2x10 abrahan 9# 2x10 abrahan 10#   Leg press    2x10 85# 3x10 85# 3x10 85#   Ther Activity         Side stepping x5 laps otb x5 laps gtb x6 laps gtb hep x6 laps btb x6 laps btb x6 laps btb   Step ups x10 FSU 6" R    2x10 LSU 6" R x10 FSU 6" R  2x10 LSU 6" R 2x10 FSU 6" R    2x10 LSU 6" R hep 2x10 FSU 9" R    2x10 LSU 9" R 2x10 FSU 9" R 8#    2x10 LSU 9" R 8# 2x10 FSU 9" R 10#    2x10 LSU 9" R 10#   Lat step overs   10" x8 B 2x10 B 10" np    Gait Training         AD training 100ft x2 no AD 100ft x2 no AD                Modalities

## 2022-06-21 ENCOUNTER — OFFICE VISIT (OUTPATIENT)
Dept: PHYSICAL THERAPY | Facility: REHABILITATION | Age: 42
End: 2022-06-21
Payer: COMMERCIAL

## 2022-06-21 DIAGNOSIS — G89.29 CHRONIC HIP PAIN, RIGHT: ICD-10-CM

## 2022-06-21 DIAGNOSIS — Z96.641 STATUS POST TOTAL HIP REPLACEMENT, RIGHT: ICD-10-CM

## 2022-06-21 DIAGNOSIS — M16.11 PRIMARY OSTEOARTHRITIS OF ONE HIP, RIGHT: Primary | ICD-10-CM

## 2022-06-21 DIAGNOSIS — M25.551 CHRONIC HIP PAIN, RIGHT: ICD-10-CM

## 2022-06-21 PROCEDURE — 97112 NEUROMUSCULAR REEDUCATION: CPT

## 2022-06-21 PROCEDURE — 97530 THERAPEUTIC ACTIVITIES: CPT

## 2022-06-21 PROCEDURE — 97110 THERAPEUTIC EXERCISES: CPT

## 2022-06-21 NOTE — PROGRESS NOTES
Daily Note     Today's date: 2022  Patient name: Raymundo Hewitt  : 1980  MRN: [id number]  Referring provider: Otilia Frausto MD  Dx:   Encounter Diagnosis     ICD-10-CM    1  Primary osteoarthritis of one hip, right  M16 11    2  Chronic hip pain, right  M25 551     G89 29    3  Status post total hip replacement, right  Z96 641                   Subjective: Pt states continued improvement upon arrival, was able to do a fast paced walking 5K with no breaks and no problems afterwards  Objective: See treatment diary below      Assessment: Tolerated treatment well  Patient would benefit from continued PT   Pt  able to complete all exercises with no increase in pain during or after session  Pt able to progress exercises this session without complaint  Pt  1:1 with PTA for entirety  Plan: Continue per plan of care        Pertinent Findings and Outcome Measures:                                                                                                                                                                         Test / Measure  2022   FOTO 49 33 65 -   Hip abd MMT 3/5 3-/5 3/5 3/5   Hip flex MMT 4-/5 3-/5 3+/5 4-/5   TUG 9 9 sec 28 4 sec 9 4 sec 7 1 sec, no SPC     Precautions: s/p R STEPHANIE 22, ant hip precautions       Manuals 5/20 5/24 5/27 6/1 6/10 6/16 6/21   R hip PROM w/in precaution CM 2' >90 deg NB 2' >90 deg np                                     Neuro Re-Ed          Mini squats x10 x10 x10 x10 x10 x10 10x   Add ball squeeze          Supine march 3x10 3x10 3x10    2x10 stand march hep 2x10 stand march B 2x10 stand march B 3x10 stand march R    SLS Foam 30"x3 Foam 30"x3 2x8 dynamic 2x8 dynamic 2x8 dynamic bilateral 2x10 dynamic bilateral 2x10 dynamic b/l   Ecc step downs NV 6" 2x10 6" 3x10 6" 2x10 6" 2x10 6" 2x10 2x10 6"   DL 3x10 15# KB 3x10 15# KB 3x10 20# KB 3x10 25# KB 2x15 25# 2x15 25# 2x15 25#   Lunges     2x10 tap bosu 2x10 tap x2 foams 2x10 tap 2x foam   Ther Ex          NS 10' L6 LE only 10' L6 LE only 10' L6 LE only 10' L7 LE only 10' L7 LE only 10' L8 LE only 10' L8 LE   STS 2x10 chair+ foam 2x10 chair+foam 2x10 chair+foam hep 2x10 chair 3x10 chair 8# 2x10 chair 10# 2x10 chair 10#   Standing hip abd 3x10 5# 3x10 5# 3x10 7# 2x10 5# abrahan D/c abrahan 2x10 8# 2x10 8#             Hip add    2x10 abrahan 7# 2x10 abrahan 9# 2x10 abrahan 10# 2x10 10#   Leg press    2x10 85# 3x10 85# 3x10 85# 3x10 100#   Ther Activity          Side stepping x5 laps otb x5 laps gtb x6 laps gtb hep x6 laps btb x6 laps btb x6 laps btb 6 laps btb   Step ups x10 FSU 6" R    2x10 LSU 6" R x10 FSU 6" R  2x10 LSU 6" R 2x10 FSU 6" R    2x10 LSU 6" R hep 2x10 FSU 9" R    2x10 LSU 9" R 2x10 FSU 9" R 8#    2x10 LSU 9" R 8# 2x10 FSU 9" R 10#    2x10 LSU 9" R 10# 2x10 ea FSU LSU 10# 9"   Lat step overs   10" x8 B 2x10 B 10" np     Gait Training          AD training 100ft x2 no AD 100ft x2 no AD                  Modalities

## 2022-06-28 ENCOUNTER — APPOINTMENT (OUTPATIENT)
Dept: PHYSICAL THERAPY | Facility: REHABILITATION | Age: 42
End: 2022-06-28
Payer: COMMERCIAL

## 2022-06-30 ENCOUNTER — OFFICE VISIT (OUTPATIENT)
Dept: PHYSICAL THERAPY | Facility: REHABILITATION | Age: 42
End: 2022-06-30
Payer: COMMERCIAL

## 2022-06-30 DIAGNOSIS — G89.29 CHRONIC HIP PAIN, RIGHT: ICD-10-CM

## 2022-06-30 DIAGNOSIS — M16.11 PRIMARY OSTEOARTHRITIS OF ONE HIP, RIGHT: Primary | ICD-10-CM

## 2022-06-30 DIAGNOSIS — M25.551 CHRONIC HIP PAIN, RIGHT: ICD-10-CM

## 2022-06-30 DIAGNOSIS — Z96.641 STATUS POST TOTAL HIP REPLACEMENT, RIGHT: ICD-10-CM

## 2022-06-30 PROCEDURE — 97110 THERAPEUTIC EXERCISES: CPT

## 2022-06-30 PROCEDURE — 97530 THERAPEUTIC ACTIVITIES: CPT

## 2022-06-30 PROCEDURE — 97112 NEUROMUSCULAR REEDUCATION: CPT

## 2022-06-30 NOTE — PROGRESS NOTES
Daily Note     Today's date: 2022  Patient name: Malcolm Mcrae  : 1980  MRN: [id number]  Referring provider: Joss Degroot MD  Dx:   Encounter Diagnosis     ICD-10-CM    1  Primary osteoarthritis of one hip, right  M16 11    2  Chronic hip pain, right  M25 551     G89 29    3  Status post total hip replacement, right  Z96 641        Start Time: 1605  Stop Time: 1644  Total time in clinic (min): 39 minutes    Subjective: Pt reports no new complaints  Continues to have no pain, and walks approx 1 mile every other day at lunch  Objective: See treatment diary below      Assessment: Tolerated treatment well  Pt able to progress strengthening with good tolerance overall  Plan for d/c at f/u due to pt demonstrating improved strength and functional independence  Pt reports fatigue post-tx and no pain  Patient would benefit from continued PT  1:1 with Ed Krause DPT for entirety of tx  Plan: Continue per plan of care  Plan for d/c at f/u       Pertinent Findings and Outcome Measures:                                                                                                                                                                         Test / Measure  2022   FOTO 49 33 65 -   Hip abd MMT 3/5 3-/5 3/5 3/5   Hip flex MMT 4-/5 3-/5 3+/5 4-/5   TUG 9 9 sec 28 4 sec 9 4 sec 7 1 sec, no SPC     Precautions: s/p R STEPHANIE 22, ant hip precautions       Manuals / 6/10 6/16 6/21 6/30   R hip PROM w/in precaution                                Neuro Re-Ed        Mini squats x10 x10 x10 10x x10   Add ball squeeze        Supine march 2x10 stand  2x10 stand  3x10 stand march R     SLS 2x8 dynamic 2x8 dynamic bilateral 2x10 dynamic bilateral 2x10 dynamic b/l x10 dynamic bilateral    Ecc step downs 6" 2x10 6" 2x10 6" 2x10 2x10 6" 3x10 6"   DL 3x10 25# KB 2x15 25# 2x15 25# 2x15 25# 2x15 25#   Lunges  2x10 tap bosu 2x10 tap x2 foams 2x10 tap 2x foam 3x10 step out, tap 2x foam   TG     2x10 supine    x6 RLE SL   Ther Ex        NS 10' L7 LE only 10' L7 LE only 10' L8 LE only 10' L8 LE 10' L8 LE only   STS 2x10 chair 3x10 chair 8# 2x10 chair 10# 2x10 chair 10# 3x10 tap chair +foam 10#   Standing hip abd 2x10 5# abrahan D/c abrahan 2x10 8# 2x10 8# dc           Hip add 2x10 abrahan 7# 2x10 abrahan 9# 2x10 abrahan 10# 2x10 10# dc   Leg press 2x10 85# 3x10 85# 3x10 85# 3x10 100# 2x15 100#   Ther Activity        Side stepping x6 laps btb x6 laps btb x6 laps btb 6 laps btb x6 laps btb   Step ups 2x10 FSU 9" R    2x10 LSU 9" R 2x10 FSU 9" R 8#    2x10 LSU 9" R 8# 2x10 FSU 9" R 10#    2x10 LSU 9" R 10# 2x10 ea FSU LSU 10# 9" 2x10 ea FSU LSU 10# 9"   Lat step overs 2x10 B 10" np      Gait Training        AD training                Modalities

## 2022-07-05 ENCOUNTER — EVALUATION (OUTPATIENT)
Dept: PHYSICAL THERAPY | Facility: REHABILITATION | Age: 42
End: 2022-07-05
Payer: COMMERCIAL

## 2022-07-05 DIAGNOSIS — G89.29 CHRONIC HIP PAIN, RIGHT: ICD-10-CM

## 2022-07-05 DIAGNOSIS — Z96.641 STATUS POST TOTAL HIP REPLACEMENT, RIGHT: ICD-10-CM

## 2022-07-05 DIAGNOSIS — M16.11 PRIMARY OSTEOARTHRITIS OF ONE HIP, RIGHT: Primary | ICD-10-CM

## 2022-07-05 DIAGNOSIS — M25.551 CHRONIC HIP PAIN, RIGHT: ICD-10-CM

## 2022-07-05 PROCEDURE — 97164 PT RE-EVAL EST PLAN CARE: CPT

## 2022-07-05 PROCEDURE — 97112 NEUROMUSCULAR REEDUCATION: CPT

## 2022-07-05 PROCEDURE — 97110 THERAPEUTIC EXERCISES: CPT

## 2022-07-05 NOTE — PROGRESS NOTES
PT Re-Evaluation / Discharge    Today's date: 2022  Patient name: Virginia Zarate  : 1980  MRN: [id number]  Referring provider: Sarai Holcomb MD  Dx:   Encounter Diagnosis     ICD-10-CM    1  Primary osteoarthritis of one hip, right  M16 11    2  Chronic hip pain, right  M25 551     G89 29    3  Status post total hip replacement, right  Z96 641        Start Time: 1706  Stop Time: 1735  Total time in clinic (min): 29 minutes    Subjective: Pt reports she is back to functional baseline  She notes feeling independent with HEP, and would like to d/c skilled PT at this time to self-manage with comprehensive HEP  Pt scheduled to f/u with ortho next week  Objective: See treatment diary below    Range of Motion: Goniometric revealed the following findings (in degrees)  Joint Motion Right:    Hip Flex 123   Hip abd 30   Hip External Rotation NT (precautions)   Knee Extension 0   Knee Flexion WFL   Ankle Dorsiflexion WFL   Ankle Plantarflexion WFL     Strength: MMT revealed the following findings  Joint Motion Right:    Hip Flexion 4/5 +pain   Hip Abduction 4+/5   Hip IR 5/5   Hip ER 5/5    Hip Extension 4+/5 (supine)   Knee Extension 5/5   Knee Flexion 5/5   Ankle Plantarflexion 5/5   Ankle Dorsiflexion 5/5         Assessment: Virginia Zarate is a 42y o  year old female with a referred dx of chronic R hip pain, R hip OA, and s/p total hip replacement  Pt is s/p R STEPHANIE on 22 with Dr Dany Cortes  Pt has demonstrated significant improvements in ROM, functional strength, and balance since continuing with formal PT  Pt is independent with comprehensive HEP and has met all PT goals  D/c skilled PT at this time, and transition to comprehensive HEP  Goals    Short Term Goals: In 4 weeks, the patient will:  1  Decrease worst pain by 2 weeks for improved QOL  - MET  2  Verbalize and maintain precautions for best surgical outcomes  - MET   3  Supervision with HEP for self care   - MET    Long Term Goals: In 8 weeks, the patient will:  1  Improve LE strength by 1/2 grade for improved activity tolerance  - MET  2  FOTO to greater than predicted value  - MET  3  Independent with comprehensive HEP upon discharge  - met  4  Improve TUG testing by 3 seconds for decreased fall risk  - MET  5  Progress AD for improved dynamic balance and facilitation of return to PLOF  - MET  6  Improve SLS to [facility] for decreased fall risk  - MET  7  Ambulate without AD for return to baseline fx  - MET  8  Return strength to [facility] for improved LE fx  - not met  9  Improve hip flexion ROM to [facility] for improved tolerance to fx ROM  - met  10   Demonstrating independence with actively lifting LE into car to meet pt's goal  - met        Plan: D/c skilled PT, transition to comprehensive HEP     Pertinent Findings and Outcome Measures:                                                                                                                                                                         Test / Measure  4/5/2022 5/2/2022 5/16/2022 5/27/2022   FOTO 49 33 65 -   Hip abd MMT 3/5 3-/5 3/5 3/5   Hip flex MMT 4-/5 3-/5 3+/5 4-/5   TUG 9 9 sec 28 4 sec 9 4 sec 7 1 sec, no SPC     Precautions: s/p R STEPHANIE 4/27/22, ant hip precautions       Manuals 6/1 6/10 6/16 6/21 6/30 7/5   R hip PROM w/in precaution                                    Neuro Re-Ed         Mini squats x10 x10 x10 10x x10    Add ball squeeze         Supine march 2x10 stand march B 2x10 stand march B 3x10 stand march R      SLS 2x8 dynamic 2x8 dynamic bilateral 2x10 dynamic bilateral 2x10 dynamic b/l x10 dynamic bilateral  x10 dynamic bilateral   Ecc step downs 6" 2x10 6" 2x10 6" 2x10 2x10 6" 3x10 6"    DL 3x10 25# KB 2x15 25# 2x15 25# 2x15 25# 2x15 25# 2x15 25#   Lunges  2x10 tap bosu 2x10 tap x2 foams 2x10 tap 2x foam 3x10 step out, tap 2x foam 3x10 step out, tap 2x foam   TG     2x10 supine    x6 RLE SL 2x10 supine    2x10 RLE SL   Ther Ex         NS 10' L7 LE only 10' L7 LE only 10' L8 LE only 10' L8 LE 10' L8 LE only 10' L8 LE only   STS 2x10 chair 3x10 chair 8# 2x10 chair 10# 2x10 chair 10# 3x10 tap chair +foam 10# 3x10 tap chair +foam 10#   Standing hip abd 2x10 5# abrahan D/c abrahan 2x10 8# 2x10 8# dc             Hip add 2x10 abrahan 7# 2x10 abrahan 9# 2x10 abrahan 10# 2x10 10# dc    Leg press 2x10 85# 3x10 85# 3x10 85# 3x10 100# 2x15 100#    Ther Activity         Side stepping x6 laps btb x6 laps btb x6 laps btb 6 laps btb x6 laps btb x6 laps btb   Step ups 2x10 FSU 9" R    2x10 LSU 9" R 2x10 FSU 9" R 8#    2x10 LSU 9" R 8# 2x10 FSU 9" R 10#    2x10 LSU 9" R 10# 2x10 ea FSU LSU 10# 9" 2x10 ea FSU LSU 10# 9" 2x10 ea FSU LSU 10# 9"   Lat step overs 2x10 B 10" np       Gait Training         AD training                  Modalities

## 2022-07-12 ENCOUNTER — OFFICE VISIT (OUTPATIENT)
Dept: OBGYN CLINIC | Facility: CLINIC | Age: 42
End: 2022-07-12

## 2022-07-12 ENCOUNTER — HOSPITAL ENCOUNTER (OUTPATIENT)
Dept: RADIOLOGY | Facility: HOSPITAL | Age: 42
Discharge: HOME | End: 2022-07-12
Attending: ORTHOPAEDIC SURGERY
Payer: COMMERCIAL

## 2022-07-12 ENCOUNTER — APPOINTMENT (OUTPATIENT)
Dept: PHYSICAL THERAPY | Facility: REHABILITATION | Age: 42
End: 2022-07-12
Payer: COMMERCIAL

## 2022-07-12 VITALS
WEIGHT: 194.6 LBS | BODY MASS INDEX: 30.54 KG/M2 | HEIGHT: 67 IN | HEART RATE: 78 BPM | SYSTOLIC BLOOD PRESSURE: 98 MMHG | DIASTOLIC BLOOD PRESSURE: 66 MMHG

## 2022-07-12 DIAGNOSIS — Z96.641 S/P HIP REPLACEMENT, RIGHT: ICD-10-CM

## 2022-07-12 DIAGNOSIS — Z96.641 S/P HIP REPLACEMENT, RIGHT: Primary | ICD-10-CM

## 2022-07-12 PROCEDURE — 73502 X-RAY EXAM HIP UNI 2-3 VIEWS: CPT

## 2022-07-12 PROCEDURE — 99024 POSTOP FOLLOW-UP VISIT: CPT | Performed by: ORTHOPAEDIC SURGERY

## 2022-07-12 NOTE — PROGRESS NOTES
Assessment:   Diagnosis ICD-10-CM Associated Orders   1  S/P hip replacement, right  Z96 641 XR hip/pelv 2-3 vws right if performed       Plan:    - new x-rays of the right hip were obtained today and reviewed with the patient noting that the hip is in appropriate position no signs of failure, loosening, dislocation  - on exam patient has no pain or discomfort with good strength  - reminded the patient to use take antibiotics prior to her dental cleanings or procedures  - patient is happy and has returned to all of her activities with no pain or discomfort  - we will see her at her 1 year with new x-rays    To do next visit:  Return for Follow up in April 2023  The above stated was discussed in layman's terms and the patient expressed understanding  All questions were answered to the patient's satisfaction  Scribe Attestation    I,:  Lynda Rodriguez am acting as a scribe while in the presence of the attending physician :       I,:  Winter Gamez MD personally performed the services described in this documentation    as scribed in my presence :             Subjective:   Argelia Guerra is a 36 y o  female who presents today for follow-up for her anterior right total hip arthroplasty performed on 04/27/2022  Patient has been in physical therapy and has now transitioned to a home exercise program  She notes that she has no pain with range of motion and has been navigated uneven ground along with doing 5K walks with no issues  Review of systems negative unless otherwise specified in HPI  Review of Systems   Constitutional: Negative for chills, fever and unexpected weight change  HENT: Negative for hearing loss, nosebleeds and sore throat  Eyes: Negative for pain, redness and visual disturbance  Respiratory: Negative for cough, shortness of breath and wheezing  Cardiovascular: Negative for chest pain, palpitations and leg swelling     Gastrointestinal: Negative for abdominal pain and nausea  Genitourinary: Negative for dyspareunia, dysuria and frequency  Skin: Negative for rash and wound  Neurological: Negative for dizziness, numbness and headaches  Psychiatric/Behavioral: Negative for decreased concentration and suicidal ideas  The patient is not nervous/anxious          Past Medical History:   Diagnosis Date    Arthritis     Right hip    Asthma     allergy induced    Environmental allergies     dust    GERD (gastroesophageal reflux disease)     Migraine with aura     Osteoarthritis     PONV (postoperative nausea and vomiting)     Seasonal allergies     Wears contact lenses     wears at night       Past Surgical History:   Procedure Laterality Date    ANKLE SURGERY Left     ANKLE STABALIZATION    CHOLECYSTECTOMY  2015    COLONOSCOPY      JOINT REPLACEMENT  March 2013    Total left hip replacement    AR LAP, DOMINGO RESTRICT PROC, LONGITUDINAL GASTRECTOMY N/A 3/2/2021    Procedure: ROBOTIC SLEEVE GASTRECTOMY; ROBOTIC PARAESOPHAGEAL HERNIA REPAIR;  Surgeon: Jasper Woodard MD;  Location: [facility];  Service: Bariatrics    AR TOTAL HIP ARTHROPLASTY Right 4/27/2022    Procedure: ARTHROPLASTY HIP TOTAL ANTERIOR;  Surgeon: Lino Veronica MD;  Location: [facility];  Service: Orthopedics    TOOTH EXTRACTION      TOTAL HIP ARTHROPLASTY Left        Family History   Problem Relation Age of Onset    Arthritis Mother     Hypertension Mother     Skin cancer Mother     Diabetes Father         MELLITUS    Hypertension Father     Anemia Sister     Celiac disease Sister     Thyroid disease Sister     Varicose Veins Sister         LOWER EXTREMITIES    Breast cancer Sister 40    Cancer Sister         Right breast cancer    Liver cancer Maternal Grandmother [de-identified]    Diabetes Maternal Grandmother     Cancer Maternal Grandmother         Liver cancer    Diabetes Paternal Grandmother         MELLIYUS    Dementia Paternal Grandmother     Diabetes Paternal Grandfather MELLITUS    Stroke Paternal Grandfather     No Known Problems Maternal Grandfather     Stroke Paternal Uncle     Heart disease Neg Hx        Social History     Occupational History     Employer: [employer]   Tobacco Use    Smoking status: Never Smoker    Smokeless tobacco: Never Used   Vaping Use    Vaping Use: Never used   Substance and Sexual Activity    Alcohol use: Not Currently    Drug use: No    Sexual activity: Not Currently     Partners: Male     Birth control/protection: Abstinence, Ring         Current Outpatient Medications:     ascorbic acid (VITAMIN C) 500 MG tablet, Take 1 tablet (500 mg total) by mouth 2 (two) times a day, Disp: 60 tablet, Rfl: 0    Cetirizine HCl (ZYRTEC PO), Take 10 mg by mouth daily , Disp: , Rfl:     etonogestrel-ethinyl estradiol (NuvaRing) 0 12-0 015 MG/24HR vaginal ring, Insert 1 each into the vagina every 28 days, Disp: 4 each, Rfl: 3    fluocinolone (SYNALAR) 0 025 % ointment, daily at bedtime Around the mouth, Disp: , Rfl: 2    Multiple Vitamins-Minerals (multivitamin with minerals) tablet, Take 1 tablet by mouth daily, Disp: 30 tablet, Rfl: 0    amoxicillin (AMOXIL) 500 MG tablet, #4 po one hour prior to dental procedure , Disp: 16 tablet, Rfl: 0    aspirin (ECOTRIN LOW STRENGTH) 81 mg EC tablet, Take 1 tablet (81 mg total) by mouth 2 (two) times a day Please do not start taking until after total joint arthroplasty, Disp: 70 tablet, Rfl: 0    [employer] Aspirin Low Dose 81 MG EC tablet, , Disp: , Rfl:     docusate sodium (COLACE) 100 mg capsule, Take 1 capsule (100 mg total) by mouth 2 (two) times a day, Disp: 10 capsule, Rfl: 0    folic acid (FOLVITE) 1 mg tablet, Take 1 tablet (1 mg total) by mouth daily, Disp: 30 tablet, Rfl: 0    HYDROcodone Bit-Homatrop MBr (Hydromet) 5-1 5 mg/5 mL syrup, Hydromet 5 mg-1 5 mg/5 mL oral syrup, Disp: , Rfl:     Misc Natural Products (Turmeric Curcumin) CAPS, Take by mouth in the morning   (Patient not taking: Reported on 7/12/2022), Disp: , Rfl:     oxyCODONE (ROXICODONE) 5 immediate release tablet, Take 1 tablets every 6 hrs as needed for pain control, Disp: 30 tablet, Rfl: 0    No Known Allergies         Vitals:    07/12/22 0738   BP: 98/66   Pulse: 78       Objective:                    Right Hip Exam     Range of Motion   Flexion: 100   External rotation: 30   Internal rotation: 25     Muscle Strength   The patient has normal right hip strength  Other   Erythema: absent  Sensation: normal (Localized anterior thigh numbness secondary to incision)  Pulse: present    Comments:  Calf is soft and non tender                Diagnostics, reviewed and taken today if performed as documented: The attending physician has personally reviewed the pertinent films in PACS and interpretation is as follows:  Xrays of the right hip were reviewed today:  Hip prosthesis is in appropriate and position with no signs of loosening, failure or dislocation  Procedures, if performed today:    Procedures    None performed      Portions of the record may have been created with voice recognition software  Occasional wrong word or "sound a like" substitutions may have occurred due to the inherent limitations of voice recognition software  Read the chart carefully and recognize, using context, where substitutions have occurred

## 2022-07-19 ENCOUNTER — APPOINTMENT (OUTPATIENT)
Dept: PHYSICAL THERAPY | Facility: REHABILITATION | Age: 42
End: 2022-07-19
Payer: COMMERCIAL

## 2022-08-07 NOTE — TELEPHONE ENCOUNTER
Nephrology Consult Note  Patient's Name: Jd Leblanc  11:14 AM  8/7/2022    Nephrologist: Jory Murphy    Reason for Consult: JEREMY  Requesting Physician:  Dr. Alvin Dc    Preoperative Elective Admission Assessment-S/w pt  Discussed same day discharge plan  Pt plans to get her preoperative tests done tomorrow 3/31, understands [place] [facility] [place]  Living Situation:  Lives and her Mother live together in multiple level home  There is a Bathroom on 1st and 2nd floor  Pt's actual bedroom is attic (3rd level) but they cleaned out spare room for pt to stay in post-op to avoid the extra steps  # of steps to enter home: 5   # of steps to second floor: bedroom level, #13 steps with handrail     First Floor Setup: Pt is considering putting a bed on the 1st floor as a sleeping option  There is also a recliner If needed  NN did educate she will be doing stairs prior to leaving hospital and stairs will be up to her comfort at home  Is there a bathroom: Yes    DME: has a RW and cane  Patient's Current Level of Function: ambulating using cane now, independent with ADLs  Post-op Caregiver: Mother  Caregiver Name and phone number for Inpatient discharge needs: MotherAdia, ph# [phone] or [phone]     Post-op Transport: Adia Landin     Outpatient Physical Therapy Site:  Site:  [place]  pre and post-op appts scheduled? Yes     Medication Management: self  Preferred Pharmacy for Post-op Medications: [id number] [place] - [address]  Placed in tx sticky note  Blood Management Vitamin Regimen: Pharmacy did not give pt folic acid, but she is taking iron and vit C  She plans to follow up with pharmacy on the folic acid and start taking when able-she will call back with questions/issues  Post-op anticoagulant:  Per surgeon's team,  "Patient has no hx of DVT or PE  She can take the Aspirin 81mg BID  "     DC Plan: Discussed same day DC with patient  She is motivated to return home and attend OP PT  Barriers to DC identified preoperatively: none identified    BMI: 29 57    [employer]: [email or web address], ph# [phone], agreeable to [facility] data collection  Enrolled  Patient Education:  Pt educated on post-op pain, early mobilization (POD0), Average inpt LOS, OP PT goal   Patient educated that our goal is to appropriately discharge patient based off their post-op function while striving to maintain maximal independence  The goal is to discharge patient to home and for them to attend outpatient physical therapy  Assigned to care team? Yes    CHW referral placed for medicare with  PCP/[place] wellness PCP? N/A, [id number] PLAN 303     referral: N/A    Pt encouraged to call with any questions, concerns or issues  Lucas Gaming     Tyrone d/w Dr. Denise Miller MD  11:14 AM  8/7/2022

## 2022-08-17 ENCOUNTER — TELEPHONE (OUTPATIENT)
Dept: FAMILY MEDICINE CLINIC | Facility: CLINIC | Age: 42
End: 2022-08-17

## 2022-09-03 ENCOUNTER — APPOINTMENT (OUTPATIENT)
Dept: LAB | Facility: CLINIC | Age: 42
End: 2022-09-03
Payer: COMMERCIAL

## 2022-09-03 DIAGNOSIS — K91.2 POSTSURGICAL MALABSORPTION: ICD-10-CM

## 2022-09-03 DIAGNOSIS — Z48.815 ENCOUNTER FOR SURGICAL AFTERCARE FOLLOWING SURGERY OF DIGESTIVE SYSTEM: ICD-10-CM

## 2022-09-03 DIAGNOSIS — M16.0 PRIMARY OSTEOARTHRITIS OF BOTH HIPS: ICD-10-CM

## 2022-09-03 DIAGNOSIS — Z98.84 BARIATRIC SURGERY STATUS: ICD-10-CM

## 2022-09-03 DIAGNOSIS — E66.3 OVERWEIGHT: ICD-10-CM

## 2022-09-03 LAB
25(OH)D3 SERPL-MCNC: 78.9 NG/ML (ref 30–100)
ALBUMIN SERPL BCP-MCNC: 3.1 G/DL (ref 3.5–5)
ALP SERPL-CCNC: 50 U/L (ref 46–116)
ALT SERPL W P-5'-P-CCNC: 18 U/L (ref 12–78)
ANION GAP SERPL CALCULATED.3IONS-SCNC: 4 MMOL/L (ref 4–13)
AST SERPL W P-5'-P-CCNC: 15 U/L (ref 5–45)
BILIRUB SERPL-MCNC: 0.51 MG/DL (ref 0.2–1)
BUN SERPL-MCNC: 16 MG/DL (ref 5–25)
CALCIUM ALBUM COR SERPL-MCNC: 9.6 MG/DL (ref 8.3–10.1)
CALCIUM SERPL-MCNC: 8.9 MG/DL (ref 8.3–10.1)
CHLORIDE SERPL-SCNC: 106 MMOL/L (ref 96–108)
CO2 SERPL-SCNC: 29 MMOL/L (ref 21–32)
CREAT SERPL-MCNC: 1.11 MG/DL (ref 0.6–1.3)
ERYTHROCYTE [DISTWIDTH] IN BLOOD BY AUTOMATED COUNT: 14.7 % (ref 11.6–15.1)
FERRITIN SERPL-MCNC: 6 NG/ML (ref 8–388)
FOLATE SERPL-MCNC: 6.5 NG/ML (ref 3.1–17.5)
GFR SERPL CREATININE-BSD FRML MDRD: 60 ML/MIN/1.73SQ M
GLUCOSE P FAST SERPL-MCNC: 93 MG/DL (ref 65–99)
HCT VFR BLD AUTO: 40.8 % (ref 34.8–46.1)
HGB BLD-MCNC: 12.6 G/DL (ref 11.5–15.4)
IRON SATN MFR SERPL: 14 % (ref 15–50)
IRON SERPL-MCNC: 72 UG/DL (ref 50–170)
MCH RBC QN AUTO: 25.3 PG (ref 26.8–34.3)
MCHC RBC AUTO-ENTMCNC: 30.9 G/DL (ref 31.4–37.4)
MCV RBC AUTO: 82 FL (ref 82–98)
PLATELET # BLD AUTO: 327 THOUSANDS/UL (ref 149–390)
PMV BLD AUTO: 10 FL (ref 8.9–12.7)
POTASSIUM SERPL-SCNC: 4.3 MMOL/L (ref 3.5–5.3)
PROT SERPL-MCNC: 7.1 G/DL (ref 6.4–8.4)
PTH-INTACT SERPL-MCNC: 35.9 PG/ML (ref 18.4–80.1)
RBC # BLD AUTO: 4.99 MILLION/UL (ref 3.81–5.12)
SODIUM SERPL-SCNC: 139 MMOL/L (ref 135–147)
TIBC SERPL-MCNC: 510 UG/DL (ref 250–450)
VIT B12 SERPL-MCNC: 724 PG/ML (ref 100–900)
WBC # BLD AUTO: 5.21 THOUSAND/UL (ref 4.31–10.16)

## 2022-09-03 PROCEDURE — 80053 COMPREHEN METABOLIC PANEL: CPT

## 2022-09-03 PROCEDURE — 82306 VITAMIN D 25 HYDROXY: CPT

## 2022-09-03 PROCEDURE — 36415 COLL VENOUS BLD VENIPUNCTURE: CPT

## 2022-09-03 PROCEDURE — 84590 ASSAY OF VITAMIN A: CPT

## 2022-09-03 PROCEDURE — 82728 ASSAY OF FERRITIN: CPT

## 2022-09-03 PROCEDURE — 83540 ASSAY OF IRON: CPT

## 2022-09-03 PROCEDURE — 85027 COMPLETE CBC AUTOMATED: CPT

## 2022-09-03 PROCEDURE — 82746 ASSAY OF FOLIC ACID SERUM: CPT

## 2022-09-03 PROCEDURE — 83970 ASSAY OF PARATHORMONE: CPT

## 2022-09-03 PROCEDURE — 83550 IRON BINDING TEST: CPT

## 2022-09-03 PROCEDURE — 82607 VITAMIN B-12: CPT

## 2022-09-03 PROCEDURE — 84425 ASSAY OF VITAMIN B-1: CPT

## 2022-09-03 PROCEDURE — 84630 ASSAY OF ZINC: CPT

## 2022-09-06 LAB — ZINC SERPL-MCNC: 70 UG/DL (ref 44–115)

## 2022-09-08 LAB
VIT A SERPL-MCNC: 25.8 UG/DL (ref 20.1–62)
VIT B1 BLD-SCNC: 75.7 NMOL/L (ref 66.5–200)

## 2022-09-09 ENCOUNTER — OFFICE VISIT (OUTPATIENT)
Dept: BARIATRICS | Facility: CLINIC | Age: 42
End: 2022-09-09
Payer: COMMERCIAL

## 2022-09-09 VITALS
HEIGHT: 67 IN | BODY MASS INDEX: 30.37 KG/M2 | HEART RATE: 80 BPM | DIASTOLIC BLOOD PRESSURE: 60 MMHG | TEMPERATURE: 98.3 F | OXYGEN SATURATION: 97 % | WEIGHT: 193.5 LBS | SYSTOLIC BLOOD PRESSURE: 110 MMHG

## 2022-09-09 DIAGNOSIS — K91.2 POSTSURGICAL MALABSORPTION: ICD-10-CM

## 2022-09-09 DIAGNOSIS — K21.9 ACID REFLUX: ICD-10-CM

## 2022-09-09 DIAGNOSIS — E66.9 OBESITY, CLASS I, BMI 30-34.9: ICD-10-CM

## 2022-09-09 DIAGNOSIS — Z48.815 ENCOUNTER FOR SURGICAL AFTERCARE FOLLOWING SURGERY OF DIGESTIVE SYSTEM: Primary | ICD-10-CM

## 2022-09-09 DIAGNOSIS — Z98.84 BARIATRIC SURGERY STATUS: ICD-10-CM

## 2022-09-09 PROCEDURE — 99213 OFFICE O/P EST LOW 20 MIN: CPT | Performed by: PHYSICIAN ASSISTANT

## 2022-09-09 RX ORDER — OMEPRAZOLE 20 MG/1
20 CAPSULE, DELAYED RELEASE ORAL DAILY
Qty: 90 CAPSULE | Refills: 0 | Status: SHIPPED | OUTPATIENT
Start: 2022-09-09 | End: 2022-12-08

## 2022-09-09 NOTE — PROGRESS NOTES
Assessment/Plan:     Patient ID: Jann Gonzalez is a 36 y o  female  Bariatric Surgery Status    -s/p Vertical Mesha Garcia 3/2/2021  Here for 18 month postop  Overall doing well but having intermittent reflux - does not happen daily but occasionally will notice it with drinking water or certain foods  Main sxs is heartburn  She has not been taking anything for her symptoms  Will get UGI and restart on omeprazole daily for now  Will call with results  She underwent hip replacement 4/2022    · Continued/Maintain healthy weight loss with good nutrition intakes  · Adequate hydration with at least 64oz  fluid intake  · Follow diet as discussed  · Follow vitamin and mineral recommendations as reviewed with you  · Exercise as tolerated  · Colonoscopy referral made: n/a  · Mammogram: scheduled     · Follow-up in 6 months  We kindly ask that your arrive 15 minutes before your scheduled appointment time with your provider to allow our staff to room you, get your vital signs and update your chart  · Get lab work done  Please call the office if you need a script  It is recommended to check with your insurance BEFORE getting labs done to make sure they are covered by your policy  · Call our office if you have any problems with abdominal pain especially associated with fever, chills, nausea, vomiting or any other concerns  · All  Post-bariatric surgery patients should be aware that very small quantities of any alcohol can cause impairment and it is very possible not to feel the effect  The effect can be in the system for several hours  It is also a stomach irritant  · It is advised to AVOID alcohol, Nonsteroidal antiinflammatory drugs (NSAIDS) and nicotine of all forms   Any of these can cause stomach irritation/pain  · Discussed the effects of alcohol on a bariatric patient and the increased impairment risk  · Keep up the good work!      Postsurgical Malabsorption   -At risk for malabsorption of vitamins/minerals secondary to malabsorption and restriction of intake from bariatric surgery  -Currently taking adequate postop bariatric surgery vitamin supplementation  -Last set of bariatric labs completed 9/2022 - reviewed today  -- add iron supplement daily   -- increase protein intake   -Patient received education about the importance of adhering to a lifelong supplementation regimen to avoid vitamin/mineral deficiencies      Diagnoses and all orders for this visit:    Encounter for surgical aftercare following surgery of digestive system    Bariatric surgery status  -     FL UPPER GI UGI; Future  -     omeprazole (PriLOSEC) 20 mg delayed release capsule; Take 1 capsule (20 mg total) by mouth daily    Postsurgical malabsorption    Obesity, Class I, BMI 30-34 9  -     FL UPPER GI UGI; Future  -     omeprazole (PriLOSEC) 20 mg delayed release capsule; Take 1 capsule (20 mg total) by mouth daily    Acid reflux  -     FL UPPER GI UGI; Future  -     omeprazole (PriLOSEC) 20 mg delayed release capsule; Take 1 capsule (20 mg total) by mouth daily    Other orders  -     CALCIUM PO; Take 2 tablets by mouth in the morning         Subjective:      Patient ID: Katelynn Rowley is a 36 y o  female       -s/p Vertical Jono Reagan 3/2/2021  Here for 18 month postop  Overall doing well but having intermittent reflux -     Initial:265  Current:193  EWL: (Weight loss is ahead of schedule at this post surgical period )  Rohith: current   Current BMI is Body mass index is 30 31 kg/m²      · Tolerating a regular diet-yes  · Eating at least 60 grams of protein per day-yes  · Following 30/60 minute rule with liquids-yes  · Drinking at least 64 ounces of fluid per day-yes  · Drinking carbonated beverages-no  · Sufficient exercise-walking   · Using NSAIDs regularly-no  · Using nicotine-no  · Using alcohol-no  · Supplements: antonia fusion + calcium    · EWL is 65%, which places the patient ahead of schedule for expected post surgical weight loss at this time  The following portions of the patient's history were reviewed and updated as appropriate: allergies, current medications, past family history, past medical history, past social history, past surgical history and problem list     Review of Systems   Constitutional: Negative  Respiratory: Negative  Cardiovascular: Negative  Gastrointestinal: Negative          + mild reflux   Neurological: Negative  Psychiatric/Behavioral: Negative  Objective:    /60 (BP Location: Left arm, Patient Position: Sitting, Cuff Size: Large)   Pulse 80   Temp 98 3 °F (36 8 °C) (Tympanic)   Ht 5' 7" (1 702 m)   Wt 87 8 kg (193 lb 8 oz)   SpO2 97%   BMI 30 31 kg/m²      Physical Exam  Vitals and nursing note reviewed  Constitutional:       Appearance: Normal appearance  She is obese  HENT:      Head: Normocephalic and atraumatic  Eyes:      Extraocular Movements: Extraocular movements intact  Pupils: Pupils are equal, round, and reactive to light  Cardiovascular:      Rate and Rhythm: Normal rate and regular rhythm  Pulmonary:      Effort: Pulmonary effort is normal       Breath sounds: Normal breath sounds  Abdominal:      General: Bowel sounds are normal       Tenderness: There is no abdominal tenderness  Musculoskeletal:         General: Normal range of motion  Cervical back: Normal range of motion  Skin:     General: Skin is warm and dry  Neurological:      General: No focal deficit present  Mental Status: She is alert and oriented to person, place, and time     Psychiatric:         Mood and Affect: Mood normal

## 2022-09-09 NOTE — PATIENT INSTRUCTIONS
Follow-up in 6 months  We kindly ask that your arrive 15 minutes before your scheduled appointment time with your provider to allow our staff to room you, get your vital signs and update your chart  Get lab work done  Please call the office if you need a script  It is recommended to check with your insurance BEFORE getting labs done to make sure they are covered by your policy  Call our office if you have any problems with abdominal pain especially associated with fever, chills, nausea, vomiting or any other concerns  All  Post-bariatric surgery patients should be aware that very small quantities of any alcohol can cause impairment and it is very possible not to feel the effect  The effect can be in the system for several hours  It is also a stomach irritant  It is advised to AVOID alcohol, Nonsteroidal antiinflammatory drugs (NSAIDS) and nicotine of all forms   Any of these can cause stomach irritation/pain  Discussed the effects of alcohol on a bariatric patient and the increased impairment risk  Keep up the good work!

## 2022-09-15 ENCOUNTER — HOSPITAL ENCOUNTER (OUTPATIENT)
Dept: RADIOLOGY | Facility: HOSPITAL | Age: 42
Discharge: HOME | End: 2022-09-15
Payer: COMMERCIAL

## 2022-09-15 DIAGNOSIS — K21.9 ACID REFLUX: ICD-10-CM

## 2022-09-15 DIAGNOSIS — E66.9 OBESITY, CLASS I, BMI 30-34.9: ICD-10-CM

## 2022-09-15 DIAGNOSIS — Z98.84 BARIATRIC SURGERY STATUS: ICD-10-CM

## 2022-09-15 PROCEDURE — 74240 X-RAY XM UPR GI TRC 1CNTRST: CPT

## 2022-09-28 ENCOUNTER — ANNUAL EXAM (OUTPATIENT)
Dept: OBGYN CLINIC | Facility: CLINIC | Age: 42
End: 2022-09-28
Payer: COMMERCIAL

## 2022-09-28 VITALS
BODY MASS INDEX: 30.57 KG/M2 | SYSTOLIC BLOOD PRESSURE: 118 MMHG | WEIGHT: 194.8 LBS | DIASTOLIC BLOOD PRESSURE: 76 MMHG | HEIGHT: 67 IN

## 2022-09-28 DIAGNOSIS — Z80.3 FAMILY HISTORY OF BREAST CANCER IN SISTER: ICD-10-CM

## 2022-09-28 DIAGNOSIS — Z01.419 ROUTINE GYNECOLOGICAL EXAMINATION: Primary | ICD-10-CM

## 2022-09-28 DIAGNOSIS — B96.89 BV (BACTERIAL VAGINOSIS): ICD-10-CM

## 2022-09-28 DIAGNOSIS — N76.0 BV (BACTERIAL VAGINOSIS): ICD-10-CM

## 2022-09-28 DIAGNOSIS — Z30.44 ENCOUNTER FOR SURVEILLANCE OF VAGINAL RING HORMONAL CONTRACEPTIVE DEVICE: ICD-10-CM

## 2022-09-28 DIAGNOSIS — N89.8 VAGINAL ODOR: ICD-10-CM

## 2022-09-28 LAB
BV WHIFF TEST VAG QL: POSITIVE
CLUE CELLS SPEC QL WET PREP: ABNORMAL
PH SMN: 5 [PH]
T VAGINALIS VAG QL WET PREP: NEGATIVE
YEAST VAG QL WET PREP: NEGATIVE

## 2022-09-28 PROCEDURE — 99396 PREV VISIT EST AGE 40-64: CPT | Performed by: PHYSICIAN ASSISTANT

## 2022-09-28 PROCEDURE — 0503F POSTPARTUM CARE VISIT: CPT | Performed by: PHYSICIAN ASSISTANT

## 2022-09-28 PROCEDURE — 87210 SMEAR WET MOUNT SALINE/INK: CPT | Performed by: PHYSICIAN ASSISTANT

## 2022-09-28 RX ORDER — ETONOGESTREL AND ETHINYL ESTRADIOL 11.7; 2.7 MG/1; MG/1
1 INSERT, EXTENDED RELEASE VAGINAL
Qty: 4 EACH | Refills: 3 | Status: SHIPPED | OUTPATIENT
Start: 2022-09-28

## 2022-09-28 RX ORDER — METRONIDAZOLE 500 MG/1
500 TABLET ORAL EVERY 12 HOURS SCHEDULED
Qty: 14 TABLET | Refills: 0 | Status: SHIPPED | OUTPATIENT
Start: 2022-09-28 | End: 2022-10-05

## 2022-09-28 NOTE — PROGRESS NOTES
Patient here for yearly exam   She c/o vaginal odor on and off since July  LMP: 8/1/22  Periods are every few months and last 6 days  Birth control: Nuvaring  She is not sexually active  She does not desire STD testing  Last Pap: 8/18/2020  Pap: neg / HPV: neg  Last Mammogram: 9/20/21   Mammogram slip given: no- scheduled 10/31/22  Family history of breast, uterine, ovarian or colon cancer: breast cancer- sister

## 2022-09-29 NOTE — PROGRESS NOTES
Assessment   36 y o  Ceasar Mcfarland presenting for annual exam      Plan   Diagnoses and all orders for this visit:    Routine gynecological examination  Normal findings on routine exam   Encouraged 150 min of exercise per week  Reviewed balanced diet  Multivitamin encouraged   Breast awareness/SBE encouraged     Encounter for surveillance of vaginal ring hormonal contraceptive device  -     etonogestrel-ethinyl estradiol (NuvaRing) 0 12-0 015 MG/24HR vaginal ring; Insert 1 each into the vagina every 28 days    Withdrawal bleeds are light and regular on current contraceptive  She is happy with this and desires to continue  Aware of symptoms to report  Refill sent to pharmacy on file  Vaginal odor  -     POCT wet mount    BV (bacterial vaginosis)  -     metroNIDAZOLE (FLAGYL) 500 mg tablet; Take 1 tablet (500 mg total) by mouth every 12 (twelve) hours for 7 days    Reviewed findings of BV  Rx for metronidazole sent to pharmacy  Reviewed common side effects, including antabuse effect  Recommended no alcohol while taking  Take with food and probiotic  Vulvar hygiene reviewed  RTO in 1 week if sx persist, sooner if worsening  Family history of breast cancer in sister  -     Ambulatory Referral to Oncology Genetics; Future    Sister with breast cancer at age 40  Reports her sister had negative genetic testing  No other family with Breast CA  No fhx of colon or ovarian cancer  LTC on mammogram 9/20/2021 32 29% with heterogeneously dense breast tissue noted  Discussed that based on her lifetime risk and breast density she may benefit from additional screening with breast MRI or genetic consultation  She is accepting of referral to genetics, but declines breast MRI  She would like to think about this modality and check coverage with insurance prior to having ordered  Aware to call office if desires going forward  Pap due 2025  Mammo slip given      RTO one year for yearly exam or sooner as needed  __________________________________________________________________    Subjective     Aleena Gill is a 36 y o  Lender Friedman presenting for annual exam      C/o vaginal odor that has been intermittent for past 2 months  Associated with thin watery discharge and vulvar irritation  No tx tried  No prior occurrence  Not currently or recently sexually active and declines STD testing  SCREENING  Last Pap: 2020 NILM/Neg  Last Mammo: 2021 BIRADS 1 - Negative  Last Colonoscopy: N/a      GYN  Periods are predictable with vaginal ring  Reports occasionally cycling through due to schedule  Flow is light lasting 6 days  Dysmenorrhea:none  Cyclic symptoms include none    Sexually active: No  Contraception: NuvaRing      Hx Abnormal pap: denies  We reviewed ASCCP guidelines for Pap testing today  Denies vaginal itching, dyspareunia, pelvic pain and other vulvar/vaginal symptoms      OB         Complaints: denies   Denies urgency, frequency, hematuria, leakage / change in stream, difficulty urinating  BREAST  Complaints: denies   Denies: breast lump, breast tenderness, nipple discharge, skin color change, and skin lesion(s)  Personal hx: none reported       Pertinent Family Hx:   Family hx of breast cancer: Sister   Family hx of ovarian cancer: no  Family hx of colon cancer: no      GENERAL  PMH reviewed/updated and is as below  Patient does follow with a PCP      Past Medical History:   Diagnosis Date    Arthritis     Right hip    Asthma     allergy induced    Environmental allergies     dust    GERD (gastroesophageal reflux disease)     Migraine with aura     Osteoarthritis     PONV (postoperative nausea and vomiting)     Seasonal allergies     Varicella 11or 10years old    Wears contact lenses     wears at night       Past Surgical History:   Procedure Laterality Date    ANKLE SURGERY Left     ANKLE STABALIZATION    BARIATRIC SURGERY  3/2/21    Gastric sleeve    CHOLECYSTECTOMY  2015    COLONOSCOPY      JOINT REPLACEMENT  03/2013    Total left hip replacement    OR LAP, DOMINGO RESTRICT PROC, LONGITUDINAL GASTRECTOMY N/A 03/02/2021    Procedure: ROBOTIC SLEEVE GASTRECTOMY; ROBOTIC PARAESOPHAGEAL HERNIA REPAIR;  Surgeon: Angel Nugent MD;  [facility];  Service: Bariatrics    OR TOTAL HIP ARTHROPLASTY Right 04/27/2022    Procedure: ARTHROPLASTY HIP TOTAL ANTERIOR;  Surgeon: Jorge Danielson MD;  [facility];  Service: Orthopedics    TOOTH EXTRACTION      TOTAL HIP ARTHROPLASTY Left          Current Outpatient Medications:     ascorbic acid (VITAMIN C) 500 MG tablet, Take 1 tablet (500 mg total) by mouth 2 (two) times a day, Disp: 60 tablet, Rfl: 0    CALCIUM PO, Take 2 tablets by mouth in the morning, Disp: , Rfl:     Cetirizine HCl (ZYRTEC PO), Take 10 mg by mouth daily , Disp: , Rfl:     etonogestrel-ethinyl estradiol (NuvaRing) 0 12-0 015 MG/24HR vaginal ring, Insert 1 each into the vagina every 28 days, Disp: 4 each, Rfl: 3    fluocinolone (SYNALAR) 0 025 % ointment, daily at bedtime Around the mouth, Disp: , Rfl: 2    metroNIDAZOLE (FLAGYL) 500 mg tablet, Take 1 tablet (500 mg total) by mouth every 12 (twelve) hours for 7 days, Disp: 14 tablet, Rfl: 0    Multiple Vitamins-Minerals (multivitamin with minerals) tablet, Take 1 tablet by mouth daily, Disp: 30 tablet, Rfl: 0    omeprazole (PriLOSEC) 20 mg delayed release capsule, Take 1 capsule (20 mg total) by mouth daily, Disp: 90 capsule, Rfl: 0    aspirin (ECOTRIN LOW STRENGTH) 81 mg EC tablet, Take 1 tablet (81 mg total) by mouth 2 (two) times a day Please do not start taking until after total joint arthroplasty (Patient not taking: Reported on 9/9/2022), Disp: 70 tablet, Rfl: 0    CVS Aspirin Low Dose 81 MG EC tablet, , Disp: , Rfl:     docusate sodium (COLACE) 100 mg capsule, Take 1 capsule (100 mg total) by mouth 2 (two) times a day, Disp: 10 capsule, Rfl: 0    folic acid (FOLVITE) 1 mg tablet, Take 1 tablet (1 mg total) by mouth daily, Disp: 30 tablet, Rfl: 0    HYDROcodone Bit-Homatrop MBr (Hydromet) 5-1 5 mg/5 mL syrup, Hydromet 5 mg-1 5 mg/5 mL oral syrup, Disp: , Rfl:     Misc Natural Products (Turmeric Curcumin) CAPS, Take by mouth in the morning   (Patient not taking: No sig reported), Disp: , Rfl:     oxyCODONE (ROXICODONE) 5 immediate release tablet, Take 1 tablets every 6 hrs as needed for pain control, Disp: 30 tablet, Rfl: 0    No Known Allergies    Social History     Socioeconomic History    Marital status: Single     Spouse name: Not on file    Number of children: Not on file    Years of education: Not on file    Highest education level: Not on file   Occupational History     Employer: [employer]   Tobacco Use    Smoking status: Never Smoker    Smokeless tobacco: Never Used   Vaping Use    Vaping Use: Never used   Substance and Sexual Activity    Alcohol use: Yes     Comment: Socially    Drug use: Never    Sexual activity: Not Currently     Partners: Male     Birth control/protection: Abstinence, Ring   Other Topics Concern    Not on file   Social History Narrative    DAILY COFFEE CONSUMPTION (0) CUPS A DAY    SEDENTARY LIFESTYLE     Social Determinants of Health     Financial Resource Strain: Not on file   Food Insecurity: Not on file   Transportation Needs: Not on file   Physical Activity: Not on file   Stress: Not on file   Social Connections: Not on file   Intimate Partner Violence: Not on file   Housing Stability: Not on file       Review of Systems     ROS:  Constitutional: Negative for fatigue and unexpected weight change  Respiratory: Negative for cough and shortness of breath  Cardiovascular: Negative for chest pain and palpitations  Gastrointestinal: Negative for abdominal pain and change in bowel habits  Breasts:  Negative, other than as noted above  Genitourinary: Negative, other than as noted above     Psychiatric: Negative for mood difficulties  Objective      /76 (BP Location: Left arm, Patient Position: Sitting, Cuff Size: Large)   Ht 5' 6 5" (1 689 m)   Wt 88 4 kg (194 lb 12 8 oz)   LMP 08/01/2022   BMI 30 97 kg/m²     Recent Results (from the past 24 hour(s))   POCT wet mount    Collection Time: 09/28/22  3:53 PM   Result Value Ref Range    Yeast, Wet Prep Negative     pH 5     Whiff Test Positive     Clue Cells Few     Trich, Wet Prep Negative      Physical Examination:    Patient appears well and is not in distress  Neck is supple without masses  Breasts are symmetrical without mass, tenderness, nipple discharge, skin changes or adenopathy  Abdomen is soft and nontender without masses  External genitals are normal without lesions or rashes  Urethral meatus and urethra are normal  Bladder is normal to palpation  Vagina frothy white discharge present  No erythema or bleeding  Contraceptive ring present  Cervix is normal without discharge or lesion  Uterus is normal, mobile, nontender without palpable mass  Adnexa are normal, nontender, without palpable mass

## 2022-10-03 ENCOUNTER — HOSPITAL ENCOUNTER (OUTPATIENT)
Dept: MAMMOGRAPHY | Facility: MEDICAL CENTER | Age: 42
Discharge: HOME | End: 2022-10-03
Payer: COMMERCIAL

## 2022-10-03 VITALS — WEIGHT: 194 LBS | HEIGHT: 67 IN | BODY MASS INDEX: 30.45 KG/M2

## 2022-10-03 DIAGNOSIS — Z12.31 ENCOUNTER FOR SCREENING MAMMOGRAM FOR MALIGNANT NEOPLASM OF BREAST: ICD-10-CM

## 2022-10-03 PROCEDURE — 77067 SCR MAMMO BI INCL CAD: CPT

## 2022-10-03 PROCEDURE — 77063 BREAST TOMOSYNTHESIS BI: CPT

## 2022-10-05 ENCOUNTER — TELEPHONE (OUTPATIENT)
Dept: GENETICS | Facility: CLINIC | Age: 42
End: 2022-10-05

## 2022-11-30 DIAGNOSIS — K21.9 ACID REFLUX: ICD-10-CM

## 2022-11-30 DIAGNOSIS — Z98.84 BARIATRIC SURGERY STATUS: ICD-10-CM

## 2022-11-30 DIAGNOSIS — E66.9 OBESITY, CLASS I, BMI 30-34.9: ICD-10-CM

## 2022-12-01 RX ORDER — OMEPRAZOLE 20 MG/1
20 CAPSULE, DELAYED RELEASE ORAL DAILY
Qty: 90 CAPSULE | Refills: 0 | Status: SHIPPED | OUTPATIENT
Start: 2022-12-01 | End: 2023-03-01

## 2022-12-01 NOTE — TELEPHONE ENCOUNTER
A: Potassium 3 4 two days in row despite adequate IV repletion  Likely in setting of poor p o   Intake/poor absorption in setting of chemotherapy and malnutrition  Mag 1 7, lower limit of normal   · Continue Kdur tablets 20 meq QD and Mag Ox 400mg QD upon discharge Medication refill request per Pt

## 2023-03-01 ENCOUNTER — TELEPHONE (OUTPATIENT)
Dept: BARIATRICS | Facility: CLINIC | Age: 43
End: 2023-03-01

## 2023-03-01 NOTE — TELEPHONE ENCOUNTER
Reached out to Pt re: massage  Explained to Pt MARCIA will order labs during her visit and we will contact her with results  Pt verbalized understanding and was agreeable to plan

## 2023-03-04 DIAGNOSIS — Z98.84 BARIATRIC SURGERY STATUS: ICD-10-CM

## 2023-03-04 DIAGNOSIS — E66.9 OBESITY, CLASS I, BMI 30-34.9: ICD-10-CM

## 2023-03-04 DIAGNOSIS — K21.9 ACID REFLUX: ICD-10-CM

## 2023-03-06 RX ORDER — OMEPRAZOLE 20 MG/1
CAPSULE, DELAYED RELEASE ORAL
Qty: 90 CAPSULE | Refills: 0 | Status: SHIPPED | OUTPATIENT
Start: 2023-03-06

## 2023-03-07 ENCOUNTER — OFFICE VISIT (OUTPATIENT)
Dept: BARIATRICS | Facility: CLINIC | Age: 43
End: 2023-03-07

## 2023-03-07 VITALS
HEART RATE: 83 BPM | BODY MASS INDEX: 28.72 KG/M2 | WEIGHT: 183 LBS | SYSTOLIC BLOOD PRESSURE: 108 MMHG | DIASTOLIC BLOOD PRESSURE: 70 MMHG | HEIGHT: 67 IN | TEMPERATURE: 97.5 F

## 2023-03-07 DIAGNOSIS — E66.3 OVERWEIGHT: ICD-10-CM

## 2023-03-07 DIAGNOSIS — Z98.84 BARIATRIC SURGERY STATUS: ICD-10-CM

## 2023-03-07 DIAGNOSIS — Z48.815 ENCOUNTER FOR SURGICAL AFTERCARE FOLLOWING SURGERY OF DIGESTIVE SYSTEM: Primary | ICD-10-CM

## 2023-03-07 DIAGNOSIS — K91.2 POSTSURGICAL MALABSORPTION: ICD-10-CM

## 2023-03-07 NOTE — PROGRESS NOTES
Assessment/Plan:     Patient ID: Leonila Guevara is a 36 y o  female  Bariatric Surgery Status    -s/p Vertical Jael Marinelli 3/2/2021    Presents to the office today for annual overall doing well     With regards to her reflux, overall doing well  She reports intermittent belching after meals on occasion  She had UGI 9/2022 which showed:  IMPRESSION:     Small sliding hiatal hernia      Postoperative changes of sleeve gastrectomy      She continues to take her PPI daily and doing well  Will have patient continue PPI daily for 3 months  In 3 months can start to trail taper as we discussed , if cannot taper will let us know at her next visit in 6 months  In 2024 she is due for screening EGD as well  · Continued/Maintain healthy weight loss with good nutrition intakes  · Adequate hydration with at least 64oz  fluid intake  · Follow diet as discussed  · Follow vitamin and mineral recommendations as reviewed with you  · Exercise as tolerated  · Colonoscopy referral made: not in age range    · Follow-up in 6 months  We kindly ask that your arrive 15 minutes before your scheduled appointment time with your provider to allow our staff to room you, get your vital signs and update your chart  · Get lab work done   Please call the office if you need a script  It is recommended to check with your insurance BEFORE getting labs done to make sure they are covered by your policy  · Call our office if you have any problems with abdominal pain especially associated with fever, chills, nausea, vomiting or any other concerns  · All  Post-bariatric surgery patients should be aware that very small quantities of any alcohol can cause impairment and it is very possible not to feel the effect  The effect can be in the system for several hours  It is also a stomach irritant  · It is advised to AVOID alcohol, Nonsteroidal antiinflammatory drugs (NSAIDS) and nicotine of all forms   Any of these can cause stomach irritation/pain  · Discussed the effects of alcohol on a bariatric patient and the increased impairment risk  · Keep up the good work! Postsurgical Malabsorption   -At risk for malabsorption of vitamins/minerals secondary to malabsorption and restriction of intake from bariatric surgery  -Currently taking adequate postop bariatric surgery vitamin supplementation  -Last set of bariatric labs completed 9/2022  -Next set of bariatric labs ordered for approximately 2 weeks  -Patient received education about the importance of adhering to a lifelong supplementation regimen to avoid vitamin/mineral deficiencies      Diagnoses and all orders for this visit:    Encounter for surgical aftercare following surgery of digestive system  -     Zinc; Future  -     Vitamin D 25 hydroxy; Future  -     Vitamin B12; Future  -     Vitamin B1, whole blood; Future  -     Vitamin A; Future  -     PTH, intact; Future  -     CBC and Platelet; Future  -     Comprehensive metabolic panel; Future  -     Ferritin; Future  -     Folate; Future  -     Iron Saturation %; Future    Bariatric surgery status  -     Zinc; Future  -     Vitamin D 25 hydroxy; Future  -     Vitamin B12; Future  -     Vitamin B1, whole blood; Future  -     Vitamin A; Future  -     PTH, intact; Future  -     CBC and Platelet; Future  -     Comprehensive metabolic panel; Future  -     Ferritin; Future  -     Folate; Future  -     Iron Saturation %; Future    Postsurgical malabsorption  -     Zinc; Future  -     Vitamin D 25 hydroxy; Future  -     Vitamin B12; Future  -     Vitamin B1, whole blood; Future  -     Vitamin A; Future  -     PTH, intact; Future  -     CBC and Platelet; Future  -     Comprehensive metabolic panel; Future  -     Ferritin; Future  -     Folate; Future  -     Iron Saturation %; Future    Overweight  -     Zinc; Future  -     Vitamin D 25 hydroxy;  Future  -     Vitamin B12; Future  -     Vitamin B1, whole blood; Future  -     Vitamin A; Future  -     PTH, intact; Future  -     CBC and Platelet; Future  -     Comprehensive metabolic panel; Future  -     Ferritin; Future  -     Folate; Future  -     Iron Saturation %; Future         Subjective:      Patient ID: Leonila Guevara is a 36 y o  female       -s/p Vertical Jael Marinelli 3/2/2021    Presents to the office today for annual overall doing well   Tolerating diet without issues; denies N/V, dysphagia, reflux  Initial:265  Current:183  EWL: (Weight loss is ahead of schedule at this post surgical period )  Rohith: current   Current BMI is Body mass index is 28 66 kg/m²  · Tolerating a regular diet-yes  · Eating at least 60 grams of protein per day-yes  · Following 30/60 minute rule with liquids-yes  · Drinking at least 64 ounces of fluid per day-yes  · Drinking carbonated beverages-no  · Sufficient exercise-yes  · Using NSAIDs regularly-occasional   · Using nicotine-no  · Using alcohol-rare  · Supplements: antonia fusion + calcium + vitron c     · EWL is 75%, which places the patient ahead of schedule for expected post surgical weight loss at this time  The following portions of the patient's history were reviewed and updated as appropriate: allergies, current medications, past family history, past medical history, past social history, past surgical history and problem list     Review of Systems   Constitutional: Negative  Respiratory: Negative  Cardiovascular: Negative  Gastrointestinal: Negative  Skin: Positive for color change (fingertips turn white in the cold - will check her vitamin labs and also advised she follow up with her pcp on this)  Neurological: Negative  Psychiatric/Behavioral: Negative  Objective:    /70   Pulse 83   Temp 97 5 °F (36 4 °C) (Tympanic)   Ht 5' 7" (1 702 m)   Wt 83 kg (183 lb)   BMI 28 66 kg/m²      Physical Exam  Vitals and nursing note reviewed     Constitutional: Appearance: Normal appearance  HENT:      Head: Normocephalic and atraumatic  Eyes:      Extraocular Movements: Extraocular movements intact  Pupils: Pupils are equal, round, and reactive to light  Cardiovascular:      Rate and Rhythm: Normal rate and regular rhythm  Pulmonary:      Effort: Pulmonary effort is normal       Breath sounds: Normal breath sounds  Abdominal:      General: Bowel sounds are normal       Tenderness: There is no abdominal tenderness  Musculoskeletal:         General: Normal range of motion  Cervical back: Normal range of motion  Skin:     General: Skin is warm and dry  Neurological:      General: No focal deficit present  Mental Status: She is alert and oriented to person, place, and time     Psychiatric:         Mood and Affect: Mood normal

## 2023-03-10 ENCOUNTER — APPOINTMENT (OUTPATIENT)
Dept: LAB | Facility: CLINIC | Age: 43
End: 2023-03-10

## 2023-03-10 DIAGNOSIS — K91.2 POSTSURGICAL MALABSORPTION: ICD-10-CM

## 2023-03-10 DIAGNOSIS — E66.3 OVERWEIGHT: ICD-10-CM

## 2023-03-10 DIAGNOSIS — Z48.815 ENCOUNTER FOR SURGICAL AFTERCARE FOLLOWING SURGERY OF DIGESTIVE SYSTEM: ICD-10-CM

## 2023-03-10 DIAGNOSIS — Z98.84 BARIATRIC SURGERY STATUS: ICD-10-CM

## 2023-03-10 LAB
25(OH)D3 SERPL-MCNC: 82 NG/ML (ref 30–100)
ALBUMIN SERPL BCP-MCNC: 3.2 G/DL (ref 3.5–5)
ALP SERPL-CCNC: 39 U/L (ref 46–116)
ALT SERPL W P-5'-P-CCNC: 20 U/L (ref 12–78)
ANION GAP SERPL CALCULATED.3IONS-SCNC: 9 MMOL/L (ref 4–13)
AST SERPL W P-5'-P-CCNC: 16 U/L (ref 5–45)
BILIRUB SERPL-MCNC: 0.33 MG/DL (ref 0.2–1)
BUN SERPL-MCNC: 15 MG/DL (ref 5–25)
CALCIUM ALBUM COR SERPL-MCNC: 9.7 MG/DL (ref 8.3–10.1)
CALCIUM SERPL-MCNC: 9.1 MG/DL (ref 8.3–10.1)
CHLORIDE SERPL-SCNC: 110 MMOL/L (ref 96–108)
CO2 SERPL-SCNC: 24 MMOL/L (ref 21–32)
CREAT SERPL-MCNC: 0.98 MG/DL (ref 0.6–1.3)
ERYTHROCYTE [DISTWIDTH] IN BLOOD BY AUTOMATED COUNT: 15.5 % (ref 11.6–15.1)
FERRITIN SERPL-MCNC: 7 NG/ML (ref 8–388)
FOLATE SERPL-MCNC: 18.9 NG/ML (ref 3.1–17.5)
GFR SERPL CREATININE-BSD FRML MDRD: 70 ML/MIN/1.73SQ M
GLUCOSE P FAST SERPL-MCNC: 88 MG/DL (ref 65–99)
HCT VFR BLD AUTO: 38.7 % (ref 34.8–46.1)
HGB BLD-MCNC: 12.3 G/DL (ref 11.5–15.4)
IRON SATN MFR SERPL: 15 % (ref 15–50)
IRON SERPL-MCNC: 60 UG/DL (ref 50–170)
MCH RBC QN AUTO: 26.5 PG (ref 26.8–34.3)
MCHC RBC AUTO-ENTMCNC: 31.8 G/DL (ref 31.4–37.4)
MCV RBC AUTO: 83 FL (ref 82–98)
PLATELET # BLD AUTO: 298 THOUSANDS/UL (ref 149–390)
PMV BLD AUTO: 10.2 FL (ref 8.9–12.7)
POTASSIUM SERPL-SCNC: 3.8 MMOL/L (ref 3.5–5.3)
PROT SERPL-MCNC: 6.5 G/DL (ref 6.4–8.4)
PTH-INTACT SERPL-MCNC: 25.4 PG/ML (ref 18.4–80.1)
RBC # BLD AUTO: 4.64 MILLION/UL (ref 3.81–5.12)
SODIUM SERPL-SCNC: 143 MMOL/L (ref 135–147)
TIBC SERPL-MCNC: 402 UG/DL (ref 250–450)
VIT B12 SERPL-MCNC: 578 PG/ML (ref 100–900)
WBC # BLD AUTO: 5.08 THOUSAND/UL (ref 4.31–10.16)

## 2023-03-12 LAB — ZINC SERPL-MCNC: 71 UG/DL (ref 44–115)

## 2023-03-14 LAB — VIT B1 BLD-SCNC: 128.4 NMOL/L (ref 66.5–200)

## 2023-03-22 LAB — VIT A SERPL-MCNC: 56.9 UG/DL (ref 20.1–62)

## 2023-05-02 ENCOUNTER — OFFICE VISIT (OUTPATIENT)
Dept: OBGYN CLINIC | Facility: CLINIC | Age: 43
End: 2023-05-02

## 2023-05-02 ENCOUNTER — HOSPITAL ENCOUNTER (OUTPATIENT)
Dept: RADIOLOGY | Facility: HOSPITAL | Age: 43
Discharge: HOME | End: 2023-05-02
Attending: ORTHOPAEDIC SURGERY

## 2023-05-02 VITALS
DIASTOLIC BLOOD PRESSURE: 70 MMHG | WEIGHT: 192 LBS | SYSTOLIC BLOOD PRESSURE: 117 MMHG | HEIGHT: 67 IN | BODY MASS INDEX: 30.13 KG/M2 | HEART RATE: 68 BPM

## 2023-05-02 DIAGNOSIS — Z96.641 S/P HIP REPLACEMENT, RIGHT: ICD-10-CM

## 2023-05-02 DIAGNOSIS — Z96.641 S/P HIP REPLACEMENT, RIGHT: Primary | ICD-10-CM

## 2023-05-02 NOTE — PROGRESS NOTES
Assessment:   Diagnosis ICD-10-CM Associated Orders   1  S/P hip replacement, right  [id number] XR hip/pelv 2-3 vws right if performed          Plan:   New xrays of the right hip/pelvis were obtained today and reviewed with the patient    On exam, she has full ROM of the hip with no pain with full strength   Continue taking antibiotics prior to any dental cleanings or procedures  She can always call in for more if needed   New x-rays of the right hip at the next visit in 1 year    To do next visit:  Return in about 1 year (around 5/2/2024) for right hip  The above stated was discussed in layman's terms and the patient expressed understanding  All questions were answered to the patient's satisfaction  Scribe Attestation    I,:  Bryanny Ricky am acting as a scribe while in the presence of the attending physician :       I,:  Crystal Flores MD personally performed the services described in this documentation    as scribed in my presence :             Subjective:   Hernan Wilder is a 36 y o  female who presents today for a 1 year follow up for her  anterior right total hip arthroplasty performed on 04/27/2022  She has been back to her normal activities with no difficulties  She has been getting back to walking fast 5k  She has been taking her antibiotics prior to dental cleanings and procedures  She reports that her knees are starting to bother her  She is approx 10 years post op form Left anterior total hip arthroplasty by Dr Abelardo Christine  Review of systems negative unless otherwise specified in HPI  Review of Systems   Constitutional: Negative for chills, fever and unexpected weight change  HENT: Negative for hearing loss, nosebleeds and sore throat  Eyes: Negative for pain, redness and visual disturbance  Respiratory: Negative for cough, shortness of breath and wheezing  Cardiovascular: Negative for chest pain, palpitations and leg swelling     Gastrointestinal: Negative for abdominal pain and nausea  Genitourinary: Negative for dyspareunia, dysuria and frequency  Skin: Negative for rash and wound  Neurological: Negative for dizziness, numbness and headaches  Psychiatric/Behavioral: Negative for decreased concentration and suicidal ideas  The patient is not nervous/anxious          Past Medical History:   Diagnosis Date    Arthritis     Right hip    Asthma     allergy induced    Environmental allergies     dust    GERD (gastroesophageal reflux disease)     Migraine with aura     Osteoarthritis     PONV (postoperative nausea and vomiting)     Seasonal allergies     Varicella 10or 9years old    Wears contact lenses     wears at night       Past Surgical History:   Procedure Laterality Date    ANKLE SURGERY Left     ANKLE STABALIZATION    BARIATRIC SURGERY  3/2/21    Gastric sleeve    CHOLECYSTECTOMY  2015    COLONOSCOPY      JOINT REPLACEMENT  03/2013    Total left hip replacement    MN ARTHRP ACETBLR/PROX FEM PROSTC AGRFT/ALGRFT Right 04/27/2022    Procedure: ARTHROPLASTY HIP TOTAL ANTERIOR;  Surgeon: Alisha Perez MD;  [facility];  Service: Orthopedics    [address] LONGITUDINAL GASTRECTOMY N/A 03/02/2021    Procedure: ROBOTIC SLEEVE GASTRECTOMY; ROBOTIC PARAESOPHAGEAL HERNIA REPAIR;  Surgeon: Donita Mckeon MD;  [facility];  Service: Bariatrics    TOOTH EXTRACTION      TOTAL HIP ARTHROPLASTY Left        Family History   Problem Relation Age of Onset    Arthritis Mother     Hypertension Mother     Skin cancer Mother     Cancer Father         Prostate cancer    Diabetes Father         MELLITUS    Hypertension Father     Anemia Sister     Celiac disease Sister     Thyroid disease Sister     Varicose Veins Sister         LOWER EXTREMITIES    Breast cancer Sister 40    Cancer Sister         Breast cancer    Liver cancer Maternal Grandmother [de-identified]    Diabetes Maternal Grandmother     Cancer Maternal Grandmother Liver cancer    No Known Problems Maternal Grandfather     Diabetes Paternal Grandmother         MELLIYUS    Dementia Paternal Grandmother     Diabetes Paternal Grandfather         MELLITUS    Stroke Paternal Grandfather     Stroke Paternal Uncle     Heart disease Neg Hx        Social History     Occupational History     Employer: [employer]   Tobacco Use    Smoking status: Never    Smokeless tobacco: Never   Vaping Use    Vaping Use: Never used   Substance and Sexual Activity    Alcohol use: Yes     Comment: Socially    Drug use: Never    Sexual activity: Not Currently     Partners: Male     Birth control/protection: Abstinence, Ring         Current Outpatient Medications:     ascorbic acid (VITAMIN C) 500 MG tablet, Take 1 tablet (500 mg total) by mouth 2 (two) times a day, Disp: 60 tablet, Rfl: 0    aspirin (ECOTRIN LOW STRENGTH) 81 mg EC tablet, Take 1 tablet (81 mg total) by mouth 2 (two) times a day Please do not start taking until after total joint arthroplasty (Patient not taking: Reported on 9/9/2022), Disp: 70 tablet, Rfl: 0    CALCIUM PO, Take 2 tablets by mouth in the morning, Disp: , Rfl:     Cetirizine HCl (ZYRTEC PO), Take 10 mg by mouth daily , Disp: , Rfl:     CVS Aspirin Low Dose 81 MG EC tablet, , Disp: , Rfl:     docusate sodium (COLACE) 100 mg capsule, Take 1 capsule (100 mg total) by mouth 2 (two) times a day, Disp: 10 capsule, Rfl: 0    etonogestrel-ethinyl estradiol (NuvaRing) 0 12-0 015 MG/24HR vaginal ring, Insert 1 each into the vagina every 28 days, Disp: 4 each, Rfl: 3    fluocinolone (SYNALAR) 0 025 % ointment, daily at bedtime Around the mouth, Disp: , Rfl: 2    folic acid (FOLVITE) 1 mg tablet, Take 1 tablet (1 mg total) by mouth daily, Disp: 30 tablet, Rfl: 0    HYDROcodone Bit-Homatrop MBr (Hydromet) 5-1 5 mg/5 mL syrup, Hydromet 5 mg-1 5 mg/5 mL oral syrup, Disp: , Rfl:     Misc Natural Products (Turmeric Curcumin) CAPS, Take by mouth in the "morning   (Patient not taking: Reported on 3/7/2023), Disp: , Rfl:     Multiple Vitamins-Minerals (multivitamin with minerals) tablet, Take 1 tablet by mouth daily, Disp: 30 tablet, Rfl: 0    omeprazole (PriLOSEC) 20 mg delayed release capsule, TAKE 1 CAPSULE BY MOUTH EVERY DAY, Disp: 90 capsule, Rfl: 0    oxyCODONE (ROXICODONE) 5 immediate release tablet, Take 1 tablets every 6 hrs as needed for pain control, Disp: 30 tablet, Rfl: 0    No Known Allergies         Vitals:    05/02/23 0743   BP: 117/70   Pulse: 68       Objective:                    Right Hip Exam     Tenderness   The patient is experiencing no tenderness  Range of Motion   Flexion: 100   External rotation: 40   Internal rotation: 20     Muscle Strength   Abduction: 5/5   Adduction: 5/5   Flexion: 5/5     Other   Erythema: absent  Sensation: normal  Pulse: present    Comments:  Calf is soft and non tender    Decrease sensation over the anterior lateral thigh      Left Hip Exam     Tenderness   The patient is experiencing no tenderness  Range of Motion   Flexion: 100   External rotation: 40   Internal rotation: 20     Muscle Strength   Abduction: 5/5   Adduction: 5/5   Flexion: 5/5     Other   Erythema: absent  Sensation: normal  Pulse: present            Diagnostics, reviewed and taken today if performed as documented: The attending physician has personally reviewed the pertinent films in PACS and interpretation is as follows:  Xrays right hip/pelvis: Hip prosthesis is in good anatomic position with no signs of loosening, fracture or dislocation  Procedures, if performed today:    Procedures    None performed      Portions of the record may have been created with voice recognition software  Occasional wrong word or \"sound a like\" substitutions may have occurred due to the inherent limitations of voice recognition software  Read the chart carefully and recognize, using context, where substitutions have occurred    "

## 2023-05-23 ENCOUNTER — TELEPHONE (OUTPATIENT)
Dept: FAMILY MEDICINE CLINIC | Facility: CLINIC | Age: 43
End: 2023-05-23

## 2023-05-23 NOTE — TELEPHONE ENCOUNTER
Patient called RE: sprain left ankle   Patient states she got an appointment with her podiatrist for 5/24/23

## 2023-06-01 DIAGNOSIS — E66.9 OBESITY, CLASS I, BMI 30-34.9: ICD-10-CM

## 2023-06-01 DIAGNOSIS — Z98.84 BARIATRIC SURGERY STATUS: ICD-10-CM

## 2023-06-01 DIAGNOSIS — K21.9 ACID REFLUX: ICD-10-CM

## 2023-06-01 RX ORDER — OMEPRAZOLE 20 MG/1
CAPSULE, DELAYED RELEASE ORAL
Qty: 90 CAPSULE | Refills: 0 | Status: SHIPPED | OUTPATIENT
Start: 2023-06-01

## 2023-07-13 ENCOUNTER — APPOINTMENT (OUTPATIENT)
Dept: LAB | Facility: HOSPITAL | Age: 43
End: 2023-07-13
Payer: COMMERCIAL

## 2023-07-13 ENCOUNTER — TELEPHONE (OUTPATIENT)
Dept: OBGYN CLINIC | Facility: CLINIC | Age: 43
End: 2023-07-13

## 2023-07-13 DIAGNOSIS — R32 URINARY INCONTINENCE, UNSPECIFIED TYPE: ICD-10-CM

## 2023-07-13 DIAGNOSIS — R32 URINARY INCONTINENCE, UNSPECIFIED TYPE: Primary | ICD-10-CM

## 2023-07-13 LAB
BACTERIA UR QL AUTO: ABNORMAL /HPF
BILIRUB UR QL STRIP: NEGATIVE
CLARITY UR: ABNORMAL
COLOR UR: ABNORMAL
GLUCOSE UR STRIP-MCNC: NEGATIVE MG/DL
HGB UR QL STRIP.AUTO: ABNORMAL
KETONES UR STRIP-MCNC: NEGATIVE MG/DL
LEUKOCYTE ESTERASE UR QL STRIP: ABNORMAL
NITRITE UR QL STRIP: NEGATIVE
NON-SQ EPI CELLS URNS QL MICRO: ABNORMAL /HPF
PH UR STRIP.AUTO: 5.5 [PH]
PROT UR STRIP-MCNC: NEGATIVE MG/DL
RBC #/AREA URNS AUTO: ABNORMAL /HPF
SP GR UR STRIP.AUTO: 1.01 (ref 1–1.03)
UROBILINOGEN UR STRIP-ACNC: <2 MG/DL
WBC #/AREA URNS AUTO: ABNORMAL /HPF
WBC CLUMPS # UR AUTO: PRESENT /UL

## 2023-07-13 PROCEDURE — 81001 URINALYSIS AUTO W/SCOPE: CPT

## 2023-07-13 PROCEDURE — 87077 CULTURE AEROBIC IDENTIFY: CPT

## 2023-07-13 PROCEDURE — 87086 URINE CULTURE/COLONY COUNT: CPT

## 2023-07-13 NOTE — TELEPHONE ENCOUNTER
----- Message from Marina Hollis Iman Nieshaalicia sent at 7/13/2023  6:27 AM EDT -----  Regarding: Discomfort   Contact: [phone]  Hi Sheron Opitz,  I’m having some discomfort with urinating. It started Monday or Tuesday. I wouldn’t say burning, and my urine doesn’t seem cloudy. I can’t seem to [facility] SOTO it like I usually do … had to get up twice during the night to go and it seemed to keep me awake.

## 2023-07-16 LAB
BACTERIA UR CULT: ABNORMAL
BACTERIA UR CULT: ABNORMAL

## 2023-07-17 ENCOUNTER — TELEPHONE (OUTPATIENT)
Dept: OBGYN CLINIC | Facility: CLINIC | Age: 43
End: 2023-07-17

## 2023-07-17 DIAGNOSIS — N30.00 ACUTE CYSTITIS WITHOUT HEMATURIA: Primary | ICD-10-CM

## 2023-07-17 RX ORDER — CEPHALEXIN 500 MG/1
500 CAPSULE ORAL 3 TIMES DAILY
Qty: 21 CAPSULE | Refills: 0 | Status: SHIPPED | OUTPATIENT
Start: 2023-07-17 | End: 2023-07-24

## 2023-07-17 NOTE — TELEPHONE ENCOUNTER
----- Message from Valdez Vazquez PA-C sent at 7/17/2023  6:46 AM EDT -----  Please call this patient and let her know her urine culture did show evidence of a UTI. Will plan to start abx. Rx for keflex sent to pharmacy.

## 2023-08-31 DIAGNOSIS — Z98.84 BARIATRIC SURGERY STATUS: ICD-10-CM

## 2023-08-31 DIAGNOSIS — K21.9 ACID REFLUX: ICD-10-CM

## 2023-08-31 DIAGNOSIS — E66.9 OBESITY, CLASS I, BMI 30-34.9: ICD-10-CM

## 2023-08-31 RX ORDER — OMEPRAZOLE 20 MG/1
CAPSULE, DELAYED RELEASE ORAL
Qty: 90 CAPSULE | Refills: 0 | Status: SHIPPED | OUTPATIENT
Start: 2023-08-31

## 2023-09-08 ENCOUNTER — OFFICE VISIT (OUTPATIENT)
Dept: BARIATRICS | Facility: CLINIC | Age: 43
End: 2023-09-08
Payer: COMMERCIAL

## 2023-09-08 VITALS
SYSTOLIC BLOOD PRESSURE: 120 MMHG | HEART RATE: 75 BPM | HEIGHT: 67 IN | TEMPERATURE: 97 F | WEIGHT: 194.5 LBS | DIASTOLIC BLOOD PRESSURE: 80 MMHG | BODY MASS INDEX: 30.53 KG/M2

## 2023-09-08 DIAGNOSIS — E66.9 OBESITY, CLASS I, BMI 30-34.9: ICD-10-CM

## 2023-09-08 DIAGNOSIS — Z98.84 BARIATRIC SURGERY STATUS: ICD-10-CM

## 2023-09-08 DIAGNOSIS — K91.2 POSTSURGICAL MALABSORPTION: ICD-10-CM

## 2023-09-08 DIAGNOSIS — Z48.815 ENCOUNTER FOR SURGICAL AFTERCARE FOLLOWING SURGERY OF DIGESTIVE SYSTEM: Primary | ICD-10-CM

## 2023-09-08 DIAGNOSIS — K21.00 GASTROESOPHAGEAL REFLUX DISEASE WITH ESOPHAGITIS WITHOUT HEMORRHAGE: ICD-10-CM

## 2023-09-08 PROCEDURE — 99213 OFFICE O/P EST LOW 20 MIN: CPT | Performed by: PHYSICIAN ASSISTANT

## 2023-09-08 NOTE — PROGRESS NOTES
Assessment/Plan:     Patient ID: Arlene Andujar is a 39 y.o. female. Bariatric Surgery Status    -s/p Vertical Krystina Hartley 3/2/2021. . Presents to the office today for annual.overall doing well     With regards to her reflux, overall stable. She reports she still has  intermittent burning and belching after meals on occasion. She had UGI 9/2022 which showed:  IMPRESSION:     Small sliding hiatal hernia.     Postoperative changes of sleeve gastrectomy. Discussed with patient about scheduling her for EGD vs her next visit in 6 month as she is due anyway for EGD in 2024. Patient would like to hold off on egd until next visit and wants to stay on her PPI every other day for now. Advised her to let us know sooner if she has any new or worsening sxs     · Continued/Maintain healthy weight loss with good nutrition intakes. · Adequate hydration with at least 64oz. fluid intake. · Follow diet as discussed. · Follow vitamin and mineral recommendations as reviewed with you. · Exercise as tolerated. · Colonoscopy referral made: n/a  · Mammo: utd    · Follow-up in 6 months. We kindly ask that your arrive 15 minutes before your scheduled appointment time with your provider to allow our staff to room you, get your vital signs and update your chart. · Get lab work done . Please call the office if you need a script. It is recommended to check with your insurance BEFORE getting labs done to make sure they are covered by your policy. · Call our office if you have any problems with abdominal pain especially associated with fever, chills, nausea, vomiting or any other concerns. · All  Post-bariatric surgery patients should be aware that very small quantities of any alcohol can cause impairment and it is very possible not to feel the effect. The effect can be in the system for several hours. It is also a stomach irritant.      · It is advised to AVOID alcohol, Nonsteroidal antiinflammatory drugs (NSAIDS) and nicotine of all forms . Any of these can cause stomach irritation/pain. · Discussed the effects of alcohol on a bariatric patient and the increased impairment risk. · Keep up the good work! Postsurgical Malabsorption   -At risk for malabsorption of vitamins/minerals secondary to malabsorption and restriction of intake from bariatric surgery  -Currently taking adequate postop bariatric surgery vitamin supplementation  -Last set of bariatric labs completed 3/2023  -Next set of bariatric labs ordered for approximately 3 months  -Patient received education about the importance of adhering to a lifelong supplementation regimen to avoid vitamin/mineral deficiencies      Diagnoses and all orders for this visit:    Encounter for surgical aftercare following surgery of digestive system  -     Zinc; Future  -     Vitamin D 25 hydroxy; Future  -     Vitamin B12; Future  -     Vitamin B1, whole blood; Future  -     Vitamin A; Future  -     PTH, intact; Future  -     CBC and Platelet; Future  -     Comprehensive metabolic panel; Future  -     Ferritin; Future  -     Folate; Future  -     TIBC Panel (incl. Iron, TIBC, % Iron Saturation); Future    Bariatric surgery status  -     Zinc; Future  -     Vitamin D 25 hydroxy; Future  -     Vitamin B12; Future  -     Vitamin B1, whole blood; Future  -     Vitamin A; Future  -     PTH, intact; Future  -     CBC and Platelet; Future  -     Comprehensive metabolic panel; Future  -     Ferritin; Future  -     Folate; Future  -     TIBC Panel (incl. Iron, TIBC, % Iron Saturation); Future    Postsurgical malabsorption  -     Zinc; Future  -     Vitamin D 25 hydroxy; Future  -     Vitamin B12; Future  -     Vitamin B1, whole blood; Future  -     Vitamin A; Future  -     PTH, intact; Future  -     CBC and Platelet; Future  -     Comprehensive metabolic panel; Future  -     Ferritin; Future  -     Folate; Future  -     TIBC Panel (incl.  Iron, TIBC, % Iron Saturation); Future    Obesity, Class I, BMI 30-34.9  -     Zinc; Future  -     Vitamin D 25 hydroxy; Future  -     Vitamin B12; Future  -     Vitamin B1, whole blood; Future  -     Vitamin A; Future  -     PTH, intact; Future  -     CBC and Platelet; Future  -     Comprehensive metabolic panel; Future  -     Ferritin; Future  -     Folate; Future  -     TIBC Panel (incl. Iron, TIBC, % Iron Saturation); Future    Gastroesophageal reflux disease with esophagitis without hemorrhage  -     Zinc; Future  -     Vitamin D 25 hydroxy; Future  -     Vitamin B12; Future  -     Vitamin B1, whole blood; Future  -     Vitamin A; Future  -     PTH, intact; Future  -     CBC and Platelet; Future  -     Comprehensive metabolic panel; Future  -     Ferritin; Future  -     Folate; Future  -     TIBC Panel (incl. Iron, TIBC, % Iron Saturation); Future         Subjective:      Patient ID: Audelia Gaitan is a 39 y.o. female.    -s/p Vertical Elta Rota Dr. Kathy Tierney 3/2/2021. . Presents to the office today for annual.overall doing well     Initial:265  Current:194  EWL: (Weight loss is ahead of schedule at this post surgical period.)  Rohith: current  Current BMI is Body mass index is 30.46 kg/m². · Tolerating a regular diet-yes  · Eating at least 60 grams of protein per day-yes  · Following 30/60 minute rule with liquids-yes  · Drinking at least 64 ounces of fluid per day-yes  · Drinking carbonated beverages-occasional   · Sufficient exercise-yes  · Using NSAIDs regularly-occasional   · Using nicotine-no  · Using alcohol-no  · Supplements: antonia fusion + calcium + vitron c    · EWL is 64%, which places the patient ahead of schedule for expected post surgical weight loss at this time.      The following portions of the patient's history were reviewed and updated as appropriate: allergies, current medications, past family history, past medical history, past social history, past surgical history and problem list.    Review of Systems   Constitutional: Negative. Respiratory: Negative. Cardiovascular: Negative. Gastrointestinal: Negative. Neurological: Positive for headaches (migraines at baseline ). Psychiatric/Behavioral: Negative. Objective:    /80   Pulse 75   Temp (!) 97 °F (36.1 °C) (Tympanic)   Ht 5' 7" (1.702 m)   Wt 88.2 kg (194 lb 8 oz)   BMI 30.46 kg/m²      Physical Exam  Vitals and nursing note reviewed. Constitutional:       Appearance: Normal appearance. She is obese. HENT:      Head: Normocephalic and atraumatic. Eyes:      Extraocular Movements: Extraocular movements intact. Pupils: Pupils are equal, round, and reactive to light. Cardiovascular:      Rate and Rhythm: Normal rate. Pulmonary:      Effort: Pulmonary effort is normal.   Musculoskeletal:         General: Normal range of motion. Cervical back: Normal range of motion. Skin:     General: Skin is warm and dry. Neurological:      General: No focal deficit present. Mental Status: She is alert and oriented to person, place, and time.    Psychiatric:         Mood and Affect: Mood normal.

## 2023-10-06 ENCOUNTER — HOSPITAL ENCOUNTER (OUTPATIENT)
Dept: MAMMOGRAPHY | Facility: MEDICAL CENTER | Age: 43
Discharge: HOME | End: 2023-10-06
Payer: COMMERCIAL

## 2023-10-06 VITALS — BODY MASS INDEX: 30.45 KG/M2 | WEIGHT: 194 LBS | HEIGHT: 67 IN

## 2023-10-06 DIAGNOSIS — Z12.31 ENCOUNTER FOR SCREENING MAMMOGRAM FOR MALIGNANT NEOPLASM OF BREAST: ICD-10-CM

## 2023-10-06 PROCEDURE — 77067 SCR MAMMO BI INCL CAD: CPT

## 2023-10-06 PROCEDURE — 77063 BREAST TOMOSYNTHESIS BI: CPT

## 2023-11-30 ENCOUNTER — ANNUAL EXAM (OUTPATIENT)
Dept: OBGYN CLINIC | Facility: CLINIC | Age: 43
End: 2023-11-30
Payer: COMMERCIAL

## 2023-11-30 VITALS
BODY MASS INDEX: 30.25 KG/M2 | DIASTOLIC BLOOD PRESSURE: 78 MMHG | HEIGHT: 66 IN | SYSTOLIC BLOOD PRESSURE: 116 MMHG | WEIGHT: 188.2 LBS

## 2023-11-30 DIAGNOSIS — Z80.3 FAMILY HISTORY OF BREAST CANCER: ICD-10-CM

## 2023-11-30 DIAGNOSIS — Z30.44 ENCOUNTER FOR SURVEILLANCE OF VAGINAL RING HORMONAL CONTRACEPTIVE DEVICE: ICD-10-CM

## 2023-11-30 DIAGNOSIS — Z01.419 ROUTINE GYNECOLOGICAL EXAMINATION: Primary | ICD-10-CM

## 2023-11-30 DIAGNOSIS — Z30.011 INITIATION OF ORAL CONTRACEPTION: ICD-10-CM

## 2023-11-30 DIAGNOSIS — Z91.89 INCREASED RISK OF BREAST CANCER: ICD-10-CM

## 2023-11-30 DIAGNOSIS — Z12.31 ENCOUNTER FOR SCREENING MAMMOGRAM FOR MALIGNANT NEOPLASM OF BREAST: ICD-10-CM

## 2023-11-30 PROCEDURE — S0612 ANNUAL GYNECOLOGICAL EXAMINA: HCPCS | Performed by: PHYSICIAN ASSISTANT

## 2023-11-30 RX ORDER — ACETAMINOPHEN AND CODEINE PHOSPHATE 120; 12 MG/5ML; MG/5ML
1 SOLUTION ORAL DAILY
Qty: 90 TABLET | Refills: 4 | Status: SHIPPED | OUTPATIENT
Start: 2023-11-30

## 2023-11-30 NOTE — PROGRESS NOTES
Assessment   39 y.o. Alessandra Chambers presenting for annual exam.     Plan   Diagnoses and all orders for this visit:    Routine gynecological examination    Normal findings on routine exam.  Encouraged 150 min of exercise per week. Reviewed balanced diet. Multivitamin encouraged   Breast awareness/SBE encouraged     Encounter for screening mammogram for malignant neoplasm of breast  -     Mammo screening bilateral w 3d & cad; Future    Encounter for surveillance of vaginal ring hormonal contraceptive device    Chart review reveals hx of Migraine with aura. Has recently begun to have more frequent HA (more TT reported). Has been awhile since she had aura. Reviewed contraindication to estrogen use. All progesterone only and non hormonal options reviewed. Will consider LARC as she desires amenorrhea but plan to start POP in interim while deciding. Does not use for contraception (hx of gastric sleeve)and is aware to use condoms if becomes SA. Initiation of oral contraception  -     norethindrone (Dee) 0.35 MG tablet; Take 1 tablet (0.35 mg total) by mouth daily    We discussed the time sensitive nature of taking her pill and management of missed pills. Condoms recommended for birth control (hx of gastric sleeve)    Increased risk of breast cancer    Family history of breast cancer  Sister dx with breast CA at age 40 and had negative genetic testing. No other FH of breast CA. LTC 23.15. scattered fibroglandular densities. Again reviewed additional screening options of ABUS/MRI. Discussed option for onc/genetics consult. Risk benefit of all reviewed. Declines referrals at this time. Pap - due 2025  Mammo slip given    Colonoscopy due @ 38       RTO one year for yearly exam or sooner as needed.     __________________________________________________________________    Subjective     Elodia Alvarado is a 39 y.o. Alessandra Chambers presenting for annual exam.     SCREENING  Last Pap: 08/18/2020 NILM/hpv neg Last Mammo: 10/06/2023 BIRADS 1 - Negative. LTC 23.15. Fibroglandular denisty   Last Colonoscopy: n/a     GYN    Periods - amenorrheic  (rare BTB with continuous cyclng) and asymptomatic on vaginal ring. Happy with this and desires to continue. Sexually active: No  Contraception: NuvaRing     Hx Abnormal pap: denies  We reviewed ASCCP guidelines for Pap testing today. Denies vaginal discharge, itching, odor, dyspareunia, pelvic pain and vulvar/vaginal symptoms    OB         Complaints: denies   Denies urgency, frequency, hematuria, leakage / change in stream, difficulty urinating. BREAST  Complaints: denies   Denies: breast lump, breast tenderness, nipple discharge, skin color change, and skin lesion(s)    Pertinent Family Hx:   Family hx of breast cancer: sister  Family hx of ovarian cancer: no  Family hx of colon cancer: no      GENERAL  PMH reviewed/updated and is as below.      Past Medical History:   Diagnosis Date    Arthritis     Right hip    Asthma     allergy induced    Environmental allergies     dust    GERD (gastroesophageal reflux disease)     Migraine with aura     Osteoarthritis     PONV (postoperative nausea and vomiting)     Seasonal allergies     Varicella 10or 9years old    Wears contact lenses     wears at night       Past Surgical History:   Procedure Laterality Date    ANKLE SURGERY Left     ANKLE STABALIZATION    BARIATRIC SURGERY  3/2/21    Gastric sleeve    CHOLECYSTECTOMY      COLONOSCOPY      JOINT REPLACEMENT  2013    Total left hip replacement    MA ARTHRP ACETBLR/PROX FEM PROSTC AGRFT/ALGRFT Right 2022    Procedure: ARTHROPLASTY HIP TOTAL ANTERIOR;  Surgeon: Catie Monte MD;  [facility];  Service: Orthopedics    MA LAPS [facility] LONGITUDINAL GASTRECTOMY N/A 2021    Procedure: ROBOTIC SLEEVE GASTRECTOMY; ROBOTIC PARAESOPHAGEAL HERNIA REPAIR;  Surgeon: Eliana Rivera MD;  Location: [facility];  Service: Don Thomas TOOTH EXTRACTION      TOTAL HIP ARTHROPLASTY Left          Current Outpatient Medications:     ascorbic acid (VITAMIN C) 500 MG tablet, Take 1 tablet (500 mg total) by mouth 2 (two) times a day, Disp: 60 tablet, Rfl: 0    CALCIUM PO, Take 2 tablets by mouth in the morning, Disp: , Rfl:     Cetirizine HCl (ZYRTEC PO), Take 10 mg by mouth daily , Disp: , Rfl:     fluocinolone (SYNALAR) 0.025 % ointment, daily at bedtime Around the mouth, Disp: , Rfl: 2    Multiple Vitamins-Minerals (multivitamin with minerals) tablet, Take 1 tablet by mouth daily, Disp: 30 tablet, Rfl: 0    norethindrone (Dee) 0.35 MG tablet, Take 1 tablet (0.35 mg total) by mouth daily, Disp: 90 tablet, Rfl: 4    omeprazole (PriLOSEC) 20 mg delayed release capsule, TAKE 1 CAPSULE BY MOUTH EVERY DAY (Patient taking differently: 4 (four) times a week), Disp: 90 capsule, Rfl: 0    [facility] Aspirin Low Dose 81 MG EC tablet, , Disp: , Rfl:     docusate sodium (COLACE) 100 mg capsule, Take 1 capsule (100 mg total) by mouth 2 (two) times a day, Disp: 10 capsule, Rfl: 0    folic acid (FOLVITE) 1 mg tablet, Take 1 tablet (1 mg total) by mouth daily, Disp: 30 tablet, Rfl: 0    HYDROcodone Bit-Homatrop MBr (Hydromet) 5-1.5 mg/5 mL syrup, Hydromet 5 mg-1.5 mg/5 mL oral syrup, Disp: , Rfl:     Misc Natural Products (Turmeric Curcumin) CAPS, Take by mouth in the morning   (Patient not taking: Reported on 3/7/2023), Disp: , Rfl:     oxyCODONE (ROXICODONE) 5 immediate release tablet, Take 1 tablets every 6 hrs as needed for pain control, Disp: 30 tablet, Rfl: 0    No Known Allergies    Social History     Socioeconomic History    Marital status: Single     Spouse name: Not on file    Number of children: Not on file    Years of education: Not on file    Highest education level: Not on file   Occupational History     Employer: [employer]   Tobacco Use    Smoking status: Never    Smokeless tobacco: Never   Vaping Use    Vaping Use: Never used   Substance and Sexual Activity    Alcohol use: Not Currently     Comment: Socially    Drug use: No    Sexual activity: Not Currently     Partners: Male     Birth control/protection: Abstinence, Ring   Other Topics Concern    Not on file   Social History Narrative    DAILY COFFEE CONSUMPTION (0) CUPS A DAY    SEDENTARY LIFESTYLE     Social Determinants of Health     Financial Resource Strain: Not on file   Food Insecurity: Not on file   Transportation Needs: Not on file   Physical Activity: Not on file   Stress: Not on file   Social Connections: Not on file   Intimate Partner Violence: Not on file   Housing Stability: Not on file       Review of Systems     Constitutional: Negative. Respiratory: Negative. Cardiovascular: Negative   Gastrointestinal: Negative   Breasts: As noted above. Genitourinary: As noted above. Psychiatric: Negative     Objective      /78 (BP Location: Left arm, Patient Position: Sitting, Cuff Size: Standard)   Ht 5' 6.25" (1.683 m)   Wt 85.4 kg (188 lb 3.2 oz)   LMP  (LMP Unknown)   BMI 30.15 kg/m²     Physical Examination:    Patient appears well and is not in distress  Breasts are symmetrical without mass, tenderness, nipple discharge, skin changes or adenopathy. Abdomen is soft and nontender without masses. External genitals are normal without lesions or rashes. Urethral meatus and urethra are normal  Bladder is normal to palpation  Vagina is normal without discharge or bleeding. Cervix is normal without discharge or lesion. Uterus is normal, mobile, nontender without palpable mass. Adnexa are normal, nontender, without palpable mass.

## 2023-12-13 ENCOUNTER — HOSPITAL ENCOUNTER (OUTPATIENT)
Dept: RADIOLOGY | Facility: HOSPITAL | Age: 43
Discharge: HOME | End: 2023-12-13
Attending: ORTHOPAEDIC SURGERY
Payer: COMMERCIAL

## 2023-12-13 ENCOUNTER — OFFICE VISIT (OUTPATIENT)
Dept: OBGYN CLINIC | Facility: CLINIC | Age: 43
End: 2023-12-13
Payer: COMMERCIAL

## 2023-12-13 DIAGNOSIS — Z96.641 S/P HIP REPLACEMENT, RIGHT: Primary | ICD-10-CM

## 2023-12-13 DIAGNOSIS — S76.011A STRAIN OF HIP FLEXOR, RIGHT, INITIAL ENCOUNTER: ICD-10-CM

## 2023-12-13 DIAGNOSIS — Z96.641 S/P HIP REPLACEMENT, RIGHT: ICD-10-CM

## 2023-12-13 PROCEDURE — 73502 X-RAY EXAM HIP UNI 2-3 VIEWS: CPT

## 2023-12-13 PROCEDURE — 99213 OFFICE O/P EST LOW 20 MIN: CPT | Performed by: ORTHOPAEDIC SURGERY

## 2023-12-13 RX ORDER — NAPROXEN 500 MG/1
500 TABLET ORAL 2 TIMES DAILY WITH MEALS
Qty: 30 TABLET | Refills: 0 | Status: SHIPPED | OUTPATIENT
Start: 2023-12-13 | End: 2023-12-27

## 2023-12-13 NOTE — PATIENT INSTRUCTIONS
Perform effective stretching exercises:   3 sets of 10 repetitions (rep)  Hold each rep for 20-30 seconds              Perform effective stretching exercises:   3 sets of 10 repetitions (rep)  Hold each rep for 20-30 seconds

## 2023-12-13 NOTE — LETTER
December 13, 2023     Patient: Hayden Sue  YOB: 1980  Date of Visit: 12/13/2023      To Whom it May Concern:    Kari Valencia is under my professional care. Andrew Zavala was seen in my office on 12/13/2023. If you have any questions or concerns, please don't hesitate to call.          Sincerely,          Gege Geller MD        CC: No Recipients

## 2023-12-13 NOTE — PROGRESS NOTES
Orthopedics          Ligia Mon 39 y.o. female MRN: [id number]      Chief Complaint:   right hip pain    HPI:   39 y. o.female complaining of right hip pain. She had a right total of arthroplasty 1-1/2 years ago. States the pain is localized in the anterior portion of her hip worse with activity mildly relieved with rest.  She does ambulate 3 miles daily however with pain anterior portion of her hip after walking long periods of time. Denies any radiation of pain denies any lateral based pain denies any stability of her right lower extremity [place] any change in numbness and tingling.                 Review Of Systems:   Skin: Normal  Neuro: See HPI  Musculoskeletal: See HPI  All other systems reviewed and are negative    Past Medical History:   Past Medical History:   Diagnosis Date    Arthritis     Right hip    Asthma     allergy induced    Environmental allergies     dust    GERD (gastroesophageal reflux disease)     Migraine with aura     Osteoarthritis     PONV (postoperative nausea and vomiting)     Seasonal allergies     Varicella 10or 9years old    Wears contact lenses     wears at night       Past Surgical History:   Past Surgical History:   Procedure Laterality Date    ANKLE SURGERY Left     ANKLE STABALIZATION    BARIATRIC SURGERY  3/2/21    Gastric sleeve    CHOLECYSTECTOMY  2015    COLONOSCOPY      JOINT REPLACEMENT  03/2013    Total left hip replacement    [place] ARTHRP ACETBLR/PROX FEM PROSTC AGRFT/ALGRFT Right 04/27/2022    Procedure: ARTHROPLASTY HIP TOTAL ANTERIOR;  Surgeon: Griselda Pinks, MD;  [facility];  Service: Orthopedics    [address] LONGITUDINAL GASTRECTOMY N/A 03/02/2021    Procedure: ROBOTIC SLEEVE GASTRECTOMY; ROBOTIC PARAESOPHAGEAL HERNIA REPAIR;  Surgeon: Kenroy Narvaez MD;  [facility];  Service: Bariatrics    TOOTH EXTRACTION      TOTAL HIP ARTHROPLASTY Left        Family History:  Family history reviewed and non-contributory  Family History Problem Relation Age of Onset    Arthritis Mother     Hypertension Mother     Skin cancer Mother     Cancer Father         Prostate    Diabetes Father         MELLITUS    Hypertension Father     Anemia Sister     Celiac disease Sister     Thyroid disease Sister     Varicose Veins Sister         LOWER EXTREMITIES    Breast cancer Sister 40    Cancer Sister         Breast cancer    Liver cancer Maternal Grandmother 80    Diabetes Maternal Grandmother     Cancer Maternal Grandmother         Liver    No Known Problems Maternal Grandfather     Diabetes Paternal Grandmother         MELLIYUS    Dementia Paternal Grandmother     Diabetes Paternal Grandfather         MELLITUS    Stroke Paternal Grandfather     Stroke Paternal Uncle     Heart disease Neg Hx          Social History:  Social History     Socioeconomic History    Marital status: Single     Spouse name: None    Number of children: None    Years of education: None    Highest education level: None   Occupational History     Employer: [employer]   Tobacco Use    Smoking status: Never    Smokeless tobacco: Never   Vaping Use    Vaping status: Never Used   Substance and Sexual Activity    Alcohol use: Not Currently     Comment: Socially    Drug use: No    Sexual activity: Not Currently     Partners: Male     Birth control/protection: Abstinence, Ring   Other Topics Concern    None   Social History Narrative    DAILY COFFEE CONSUMPTION (0) CUPS A DAY    SEDENTARY LIFESTYLE     Social Determinants of Health     Financial Resource Strain: Not on file   Food Insecurity: Not on file   Transportation Needs: Not on file   Physical Activity: Not on file   Stress: Not on file   Social Connections: Not on file   Intimate Partner Violence: Not on file   Housing Stability: Not on file       Allergies:   No Known Allergies    Labs:  0   Lab Value Date/Time    HCT 38.7 03/10/2023 0640    HCT 40.8 09/03/2022 0821    HCT 40.1 03/30/2022 1703    HGB 12.3 03/10/2023 0640    HGB 12.6 09/03/2022 0821    HGB 13.1 03/30/2022 1703    INR 0.96 03/30/2022 1703    WBC 5.08 03/10/2023 0640    WBC 5.21 09/03/2022 0821    WBC 7.19 03/30/2022 1703       Meds:    Current Outpatient Medications:     ascorbic acid (VITAMIN C) 500 MG tablet, Take 1 tablet (500 mg total) by mouth 2 (two) times a day, Disp: 60 tablet, Rfl: 0    CALCIUM PO, Take 2 tablets by mouth in the morning, Disp: , Rfl:     Cetirizine HCl (ZYRTEC PO), Take 10 mg by mouth daily , Disp: , Rfl:     fluocinolone (SYNALAR) 0.025 % ointment, daily at bedtime Around the mouth, Disp: , Rfl: 2    Multiple Vitamins-Minerals (multivitamin with minerals) tablet, Take 1 tablet by mouth daily, Disp: 30 tablet, Rfl: 0    naproxen (EC NAPROSYN) 500 MG EC tablet, Take 1 tablet (500 mg total) by mouth 2 (two) times a day with meals for 14 days, Disp: 30 tablet, Rfl: 0    norethindrone (Dee) 0.35 MG tablet, Take 1 tablet (0.35 mg total) by mouth daily, Disp: 90 tablet, Rfl: 4    omeprazole (PriLOSEC) 20 mg delayed release capsule, TAKE 1 CAPSULE BY MOUTH EVERY DAY (Patient taking differently: 4 (four) times a week), Disp: 90 capsule, Rfl: 0    [facility]S Aspirin Low Dose 81 MG EC tablet, , Disp: , Rfl:     docusate sodium (COLACE) 100 mg capsule, Take 1 capsule (100 mg total) by mouth 2 (two) times a day, Disp: 10 capsule, Rfl: 0    folic acid (FOLVITE) 1 mg tablet, Take 1 tablet (1 mg total) by mouth daily, Disp: 30 tablet, Rfl: 0    HYDROcodone Bit-Homatrop MBr (Hydromet) 5-1.5 mg/5 mL syrup, Hydromet 5 mg-1.5 mg/5 mL oral syrup, Disp: , Rfl:     Misc Natural Products (Turmeric Curcumin) CAPS, Take by mouth in the morning   (Patient not taking: Reported on 3/7/2023), Disp: , Rfl:     oxyCODONE (ROXICODONE) 5 immediate release tablet, Take 1 tablets every 6 hrs as needed for pain control, Disp: 30 tablet, Rfl: 0    There is no height or weight on file to calculate BMI.   Wt Readings from Last 3 Encounters:   11/30/23 85.4 kg (188 lb 3.2 oz)   10/06/23 88 kg (194 lb)   09/08/23 88.2 kg (194 lb 8 oz)     Physical Exam:   There were no vitals filed for this visit. General Appearance:    Alert, cooperative, no distress, appears stated age   Head:    Normocephalic, without obvious abnormality, atraumatic   Eyes:    conjunctiva/corneas clear, both eyes        Nose:   Nares normal, septum midline, no drainage    Throat:   Lips normal; teeth and gums normal   Neck:    symmetrical, trachea midline, ;     thyroid:  no enlargement/   Back:     Symmetric, no curvature, ROM normal   Lungs:   No audible wheezing or labored breathing   Chest Wall:    No tenderness or deformity    Heart:    Regular rate and rhythm               Pulses:   2+ and symmetric all extremities   Skin:   Skin color, texture, turgor normal, no rashes or lesions   Neurologic:   normal strength, sensation and reflexes     throughout       Musculoskeletal: right lower extremity  Examination patient's right hip no effusion no erythema active flexion to 90 degrees with pain localized in the anterior portion of her hip hip flexion strength 4+ out of 5 to 5 and 5 left lower extremity no pain with passive motion of the right hip to greater than 100 degrees no pain with internal/external rotation negative modified straight leg raise no pain palpation greater enteric region abduction adduction strength 5 out of 5 sensation intact as well as present    Radiology:   I personally reviewed the films. X-rays right hip reveal well reduced right total of arthroplasty no evidence of loosening or osseous abnormality    _*_*_*_*_*_*_*_*_*_*_*_*_*_*_*_*_*_*_*_*_*_*_*_*_*_*_*_*_*_*_*_*_*_*_*_*_*_*_*_*_*    Assessment:  39 y. o.female with right hip flexor strain history of right total hip arthroplasty    Plan:   Weight bearing as tolerated  right lower extremity  Patient provided with anti-inflammatory medication  Patient provided with home range of motion stretching strengthening exercises  Patient also given formal physical therapy prescription  Case discussed with Dr. Camacho Expose  Follow up in 3 months or sooner if needed      Magalie Garces PA-C

## 2024-02-14 ENCOUNTER — OFFICE VISIT (OUTPATIENT)
Dept: OBGYN CLINIC | Facility: MEDICAL CENTER | Age: 44
End: 2024-02-14
Payer: COMMERCIAL

## 2024-02-14 ENCOUNTER — APPOINTMENT (OUTPATIENT)
Dept: RADIOLOGY | Facility: MEDICAL CENTER | Age: 44
End: 2024-02-14
Payer: COMMERCIAL

## 2024-02-14 VITALS
SYSTOLIC BLOOD PRESSURE: 118 MMHG | HEART RATE: 76 BPM | BODY MASS INDEX: 30.15 KG/M2 | DIASTOLIC BLOOD PRESSURE: 73 MMHG | HEIGHT: 66 IN

## 2024-02-14 DIAGNOSIS — M79.652 PAIN OF LEFT THIGH: ICD-10-CM

## 2024-02-14 DIAGNOSIS — Z96.643 HISTORY OF BILATERAL HIP REPLACEMENTS: ICD-10-CM

## 2024-02-14 DIAGNOSIS — S76.312A HAMSTRING MUSCLE STRAIN, LEFT, INITIAL ENCOUNTER: ICD-10-CM

## 2024-02-14 DIAGNOSIS — S76.312A HAMSTRING MUSCLE STRAIN, LEFT, INITIAL ENCOUNTER: Primary | ICD-10-CM

## 2024-02-14 PROCEDURE — 99214 OFFICE O/P EST MOD 30 MIN: CPT | Performed by: EMERGENCY MEDICINE

## 2024-02-14 PROCEDURE — 73502 X-RAY EXAM HIP UNI 2-3 VIEWS: CPT

## 2024-02-14 NOTE — PATIENT INSTRUCTIONS
You may use Advil (ibuprofen) 400-600mg every 6 hours or at least twice per day (OR Aleve (naproxen) 250-500mg every 12 hours as needed for pain and inflammation).    You may also take Tylenol (acetaminophen) together with Advil (ibuprofen) or Aleve (naproxen) as this is safe and can help decrease your pain levels.  The dosing for Tylenol is 500mg every 4-6 hours as needed OR max 1,000mg per dose up to 3 times per day for a total of 3,000mg per day  *Check with your primary care physician to see if these medications are safe to take and to make sure they do not interfere with your other medications and medical issues.

## 2024-02-14 NOTE — PROGRESS NOTES
Assessment/Plan:    Diagnoses and all orders for this visit:    Hamstring muscle strain, left, initial encounter  -     MRI femur left wo contrast; Future  -     Ambulatory Referral to Physical Therapy; Future  -     XR hip/pelv 2-3 vws left if performed; Future    Pain of left thigh  -     MRI femur left wo contrast; Future  -     Ambulatory Referral to Physical Therapy; Future  -     XR hip/pelv 2-3 vws left if performed; Future    History of bilateral hip replacements  -     XR hip/pelv 2-3 vws left if performed; Future    MRI Left Femur evaluate for chronic hamstring pain s/p injury r/o proximal tendon rupture  Patient is hesitant on PT as she does stretch at home and has just completed PT for Right hip, notes high copay  Discussed activity modification  Less likely arising from L spine  Recommend to f/u with Hip surgery for their input on left sided symptoms  Xrays Left Hip obtained today wnl    Return for Follow Up After Imaging Study.      Subjective:   Patient ID: Jazmine Tran is a 43 y.o. female.    NP Hx B/L THAs presents for chronic left hamstring pain she'll typically experience twice a year usually associated with increased exercise.    In 2016 notes injury and popping of the hamstring.    She'll experience b/l feel N/T only while walking on treadmill  No prior eval  2013 Left STEPHANIE        Review of Systems    The following portions of the patient's chart were reviewed and updated as appropriate:   Allergy:  No Known Allergies    Medications:    Current Outpatient Medications:     ascorbic acid (VITAMIN C) 500 MG tablet, Take 1 tablet (500 mg total) by mouth 2 (two) times a day, Disp: 60 tablet, Rfl: 0    CALCIUM PO, Take 2 tablets by mouth in the morning, Disp: , Rfl:     Cetirizine HCl (ZYRTEC PO), Take 10 mg by mouth daily , Disp: , Rfl:     [employer] Aspirin Low Dose 81 MG EC tablet, , Disp: , Rfl:     docusate sodium (COLACE) 100 mg capsule, Take 1 capsule (100 mg total) by mouth 2 (two) times a  "day, Disp: 10 capsule, Rfl: 0    fluocinolone (SYNALAR) 0.025 % ointment, daily at bedtime Around the mouth, Disp: , Rfl: 2    folic acid (FOLVITE) 1 mg tablet, Take 1 tablet (1 mg total) by mouth daily, Disp: 30 tablet, Rfl: 0    HYDROcodone Bit-Homatrop MBr (Hydromet) 5-1.5 mg/5 mL syrup, Hydromet 5 mg-1.5 mg/5 mL oral syrup, Disp: , Rfl:     Misc Natural Products (Turmeric Curcumin) CAPS, Take by mouth in the morning   (Patient not taking: Reported on 3/7/2023), Disp: , Rfl:     Multiple Vitamins-Minerals (multivitamin with minerals) tablet, Take 1 tablet by mouth daily, Disp: 30 tablet, Rfl: 0    naproxen (EC NAPROSYN) 500 MG EC tablet, Take 1 tablet (500 mg total) by mouth 2 (two) times a day with meals for 14 days, Disp: 30 tablet, Rfl: 0    norethindrone (Dee) 0.35 MG tablet, Take 1 tablet (0.35 mg total) by mouth daily, Disp: 90 tablet, Rfl: 4    omeprazole (PriLOSEC) 20 mg delayed release capsule, TAKE 1 CAPSULE BY MOUTH EVERY DAY (Patient taking differently: 4 (four) times a week), Disp: 90 capsule, Rfl: 0    oxyCODONE (ROXICODONE) 5 immediate release tablet, Take 1 tablets every 6 hrs as needed for pain control, Disp: 30 tablet, Rfl: 0    Patient Active Problem List   Diagnosis    Primary osteoarthritis of right hip    Migraine with aura    Asthma    GERD (gastroesophageal reflux disease)    H/O gastric sleeve    PONV (postoperative nausea and vomiting)    Status post total hip replacement, right       Objective:  /73   Pulse 76   Ht 5' 6.25\" (1.683 m)   BMI 30.15 kg/m²     Left Knee Exam     Comments:  Knee flexion strength 5/5 b/l   No defect of hamstring, discomfort with palpation of muscle belly      Back Exam     Range of Motion   Flexion:  abnormal     Tests   Straight leg raise right: negative  Straight leg raise left: negative    Reflexes   Patellar:  normal    Other   Toe walk: normal  Heel walk: normal          Physical Exam  Musculoskeletal:      Lumbar back: Negative right straight " leg raise test and negative left straight leg raise test.           Neurologic Exam    Procedures    I have personally reviewed pertinent films in PACS and my interpretation is x-ray left hip shows hardware in place from prior bilateral hip STEPHANIE's with no acute findings or evidence of hardware failure.            Past Medical History:   Diagnosis Date    Arthritis     Right hip    Asthma     allergy induced    Environmental allergies     dust    GERD (gastroesophageal reflux disease)     Migraine with aura     Osteoarthritis     PONV (postoperative nausea and vomiting)     Seasonal allergies     Varicella 4 or 5 years old    Wears contact lenses     wears at night       Past Surgical History:   Procedure Laterality Date    ANKLE SURGERY Left     ANKLE STABALIZATION    BARIATRIC SURGERY  3/2/21    Gastric sleeve    CHOLECYSTECTOMY  2015    COLONOSCOPY      JOINT REPLACEMENT  03/2013    Total left hip replacement    IA ARTHRP ACETBLR/PROX FEM PROSTC AGRFT/ALGRFT Right 04/27/2022    Procedure: ARTHROPLASTY HIP TOTAL ANTERIOR;  Surgeon: Cindi Vences MD;  [facility];  Service: Orthopedics    IA LAPS [facility] RSTRICTIV PX LONGITUDINAL GASTRECTOMY N/A 03/02/2021    Procedure: ROBOTIC SLEEVE GASTRECTOMY; ROBOTIC PARAESOPHAGEAL HERNIA REPAIR;  Surgeon: Blanca Shah MD;  Location: [facility];  Service: Bariatrics    TOOTH EXTRACTION      TOTAL HIP ARTHROPLASTY Left        Social History     Socioeconomic History    Marital status: Single     Spouse name: Not on file    Number of children: Not on file    Years of education: Not on file    Highest education level: Not on file   Occupational History     Employer: [employer]   Tobacco Use    Smoking status: Never    Smokeless tobacco: Never   Vaping Use    Vaping status: Never Used   Substance and Sexual Activity    Alcohol use: Not Currently     Comment: Socially    Drug use: No    Sexual activity: Not Currently     Partners: Male     Birth  control/protection: Abstinence, Ring   Other Topics Concern    Not on file   Social History Narrative    DAILY COFFEE CONSUMPTION (0) CUPS A DAY    SEDENTARY LIFESTYLE     Social Determinants of Health     Financial Resource Strain: Not on file   Food Insecurity: Not on file   Transportation Needs: Not on file   Physical Activity: Not on file   Stress: Not on file   Social Connections: Not on file   Intimate Partner Violence: Not on file   Housing Stability: Not on file       Family History   Problem Relation Age of Onset    Arthritis Mother     Hypertension Mother     Skin cancer Mother     Cancer Father         Prostate    Diabetes Father         MELLITUS    Hypertension Father     Anemia Sister     Celiac disease Sister     Thyroid disease Sister     Varicose Veins Sister         LOWER EXTREMITIES    Breast cancer Sister 44    Cancer Sister         Breast cancer    Liver cancer Maternal Grandmother 80    Diabetes Maternal Grandmother     Cancer Maternal Grandmother         Liver    No Known Problems Maternal Grandfather     Diabetes Paternal Grandmother         MELLIYUS    Dementia Paternal Grandmother     Diabetes Paternal Grandfather         MELLITUS    Stroke Paternal Grandfather     Stroke Paternal Uncle     Heart disease Neg Hx

## 2024-02-20 ENCOUNTER — HOSPITAL ENCOUNTER (OUTPATIENT)
Dept: MRI IMAGING | Facility: HOSPITAL | Age: 44
Discharge: HOME | End: 2024-02-20
Attending: EMERGENCY MEDICINE
Payer: COMMERCIAL

## 2024-02-20 DIAGNOSIS — S76.312A HAMSTRING MUSCLE STRAIN, LEFT, INITIAL ENCOUNTER: ICD-10-CM

## 2024-02-20 DIAGNOSIS — M79.652 PAIN OF LEFT THIGH: ICD-10-CM

## 2024-02-20 PROCEDURE — 73718 MRI LOWER EXTREMITY W/O DYE: CPT

## 2024-02-20 PROCEDURE — G1004 CDSM NDSC: HCPCS

## 2024-02-24 ENCOUNTER — APPOINTMENT (OUTPATIENT)
Dept: LAB | Facility: CLINIC | Age: 44
End: 2024-02-24
Payer: COMMERCIAL

## 2024-02-24 DIAGNOSIS — E66.9 OBESITY, CLASS I, BMI 30-34.9: ICD-10-CM

## 2024-02-24 DIAGNOSIS — Z98.84 BARIATRIC SURGERY STATUS: ICD-10-CM

## 2024-02-24 DIAGNOSIS — K21.00 GASTROESOPHAGEAL REFLUX DISEASE WITH ESOPHAGITIS WITHOUT HEMORRHAGE: ICD-10-CM

## 2024-02-24 DIAGNOSIS — K91.2 POSTSURGICAL MALABSORPTION: ICD-10-CM

## 2024-02-24 DIAGNOSIS — Z48.815 ENCOUNTER FOR SURGICAL AFTERCARE FOLLOWING SURGERY OF DIGESTIVE SYSTEM: ICD-10-CM

## 2024-02-24 LAB
25(OH)D3 SERPL-MCNC: 79.3 NG/ML (ref 30–100)
ALBUMIN SERPL BCP-MCNC: 4.4 G/DL (ref 3.5–5)
ALP SERPL-CCNC: 37 U/L (ref 34–104)
ALT SERPL W P-5'-P-CCNC: 25 U/L (ref 7–52)
ANION GAP SERPL CALCULATED.3IONS-SCNC: 7 MMOL/L
AST SERPL W P-5'-P-CCNC: 24 U/L (ref 13–39)
BILIRUB SERPL-MCNC: 0.73 MG/DL (ref 0.2–1)
BUN SERPL-MCNC: 20 MG/DL (ref 5–25)
CALCIUM SERPL-MCNC: 9.1 MG/DL (ref 8.4–10.2)
CHLORIDE SERPL-SCNC: 107 MMOL/L (ref 96–108)
CO2 SERPL-SCNC: 28 MMOL/L (ref 21–32)
CREAT SERPL-MCNC: 0.89 MG/DL (ref 0.6–1.3)
ERYTHROCYTE [DISTWIDTH] IN BLOOD BY AUTOMATED COUNT: 14.9 % (ref 11.6–15.1)
FERRITIN SERPL-MCNC: 20 NG/ML (ref 11–307)
FOLATE SERPL-MCNC: >22.3 NG/ML
GFR SERPL CREATININE-BSD FRML MDRD: 79 ML/MIN/1.73SQ M
GLUCOSE P FAST SERPL-MCNC: 91 MG/DL (ref 65–99)
HCT VFR BLD AUTO: 41.1 % (ref 34.8–46.1)
HGB BLD-MCNC: 13.5 G/DL (ref 11.5–15.4)
IRON SATN MFR SERPL: 19 % (ref 15–50)
IRON SERPL-MCNC: 61 UG/DL (ref 50–212)
MCH RBC QN AUTO: 28.9 PG (ref 26.8–34.3)
MCHC RBC AUTO-ENTMCNC: 32.8 G/DL (ref 31.4–37.4)
MCV RBC AUTO: 88 FL (ref 82–98)
PLATELET # BLD AUTO: 288 THOUSANDS/UL (ref 149–390)
PMV BLD AUTO: 10.7 FL (ref 8.9–12.7)
POTASSIUM SERPL-SCNC: 4.1 MMOL/L (ref 3.5–5.3)
PROT SERPL-MCNC: 7 G/DL (ref 6.4–8.4)
PTH-INTACT SERPL-MCNC: 34.1 PG/ML (ref 12–88)
RBC # BLD AUTO: 4.67 MILLION/UL (ref 3.81–5.12)
SODIUM SERPL-SCNC: 142 MMOL/L (ref 135–147)
TIBC SERPL-MCNC: 313 UG/DL (ref 250–450)
UIBC SERPL-MCNC: 252 UG/DL (ref 155–355)
VIT B12 SERPL-MCNC: 1052 PG/ML (ref 180–914)
WBC # BLD AUTO: 4.74 THOUSAND/UL (ref 4.31–10.16)

## 2024-02-24 PROCEDURE — 82746 ASSAY OF FOLIC ACID SERUM: CPT

## 2024-02-24 PROCEDURE — 82728 ASSAY OF FERRITIN: CPT

## 2024-02-24 PROCEDURE — 82306 VITAMIN D 25 HYDROXY: CPT

## 2024-02-24 PROCEDURE — 83540 ASSAY OF IRON: CPT

## 2024-02-24 PROCEDURE — 85027 COMPLETE CBC AUTOMATED: CPT

## 2024-02-24 PROCEDURE — 84425 ASSAY OF VITAMIN B-1: CPT

## 2024-02-24 PROCEDURE — 36415 COLL VENOUS BLD VENIPUNCTURE: CPT

## 2024-02-24 PROCEDURE — 83970 ASSAY OF PARATHORMONE: CPT

## 2024-02-24 PROCEDURE — 80053 COMPREHEN METABOLIC PANEL: CPT

## 2024-02-24 PROCEDURE — 82607 VITAMIN B-12: CPT

## 2024-02-24 PROCEDURE — 84630 ASSAY OF ZINC: CPT

## 2024-02-24 PROCEDURE — 84590 ASSAY OF VITAMIN A: CPT

## 2024-02-24 PROCEDURE — 83550 IRON BINDING TEST: CPT

## 2024-02-27 ENCOUNTER — OFFICE VISIT (OUTPATIENT)
Dept: OBGYN CLINIC | Facility: MEDICAL CENTER | Age: 44
End: 2024-02-27
Payer: COMMERCIAL

## 2024-02-27 VITALS — WEIGHT: 188 LBS | HEIGHT: 66 IN | BODY MASS INDEX: 30.22 KG/M2

## 2024-02-27 DIAGNOSIS — S76.312A HAMSTRING MUSCLE STRAIN, LEFT, INITIAL ENCOUNTER: Primary | ICD-10-CM

## 2024-02-27 DIAGNOSIS — M79.652 PAIN OF LEFT THIGH: ICD-10-CM

## 2024-02-27 DIAGNOSIS — Z96.643 HISTORY OF BILATERAL HIP REPLACEMENTS: ICD-10-CM

## 2024-02-27 PROCEDURE — 99213 OFFICE O/P EST LOW 20 MIN: CPT | Performed by: EMERGENCY MEDICINE

## 2024-02-27 NOTE — LETTER
February 27, 2024     Patient: Jazmine Tran  YOB: 1980  Date of Visit: 2/27/2024      To Whom it May Concern:    Jazmine Tran is under my professional care. Jazmine was seen in my office on 2/27/2024.     If you have any questions or concerns, please don't hesitate to call.         Sincerely,          Taiwo Hillman MD        CC: No Recipients

## 2024-02-27 NOTE — PROGRESS NOTES
Assessment/Plan:    Diagnoses and all orders for this visit:    Hamstring muscle strain, left, initial encounter  -     Ambulatory Referral to Orthopedic Surgery; Future    Pain of left thigh  -     Ambulatory Referral to Orthopedic Surgery; Future    History of bilateral hip replacements    Kallie will try PT, but due to high copay will focus on HEP.  We discussed PRP, as well as Sports orthopedic surgeon consult.      Return if symptoms worsen or fail to improve.      Subjective:   Patient ID: Jazmine Tran is a 43 y.o. female.    Patient returns to review MRI Left Femur due to chronic pain to evaluate proximal hamstring tendon.      Initial note:  NP Hx B/L THAs presents for chronic left hamstring pain she'll typically experience twice a year usually associated with increased exercise.    In 2016 notes injury and popping of the hamstring.    She'll experience b/l feel N/T only while walking on treadmill  No prior eval  2013 Left STEPHANIE        Review of Systems    The following portions of the patient's chart were reviewed and updated as appropriate:   Allergy:  No Known Allergies    Medications:    Current Outpatient Medications:     ascorbic acid (VITAMIN C) 500 MG tablet, Take 1 tablet (500 mg total) by mouth 2 (two) times a day, Disp: 60 tablet, Rfl: 0    CALCIUM PO, Take 2 tablets by mouth in the morning, Disp: , Rfl:     Cetirizine HCl (ZYRTEC PO), Take 10 mg by mouth daily , Disp: , Rfl:     fluocinolone (SYNALAR) 0.025 % ointment, daily at bedtime Around the mouth, Disp: , Rfl: 2    Multiple Vitamins-Minerals (multivitamin with minerals) tablet, Take 1 tablet by mouth daily, Disp: 30 tablet, Rfl: 0    norethindrone (Dee) 0.35 MG tablet, Take 1 tablet (0.35 mg total) by mouth daily, Disp: 90 tablet, Rfl: 4    [employer]S Aspirin Low Dose 81 MG EC tablet, , Disp: , Rfl:     docusate sodium (COLACE) 100 mg capsule, Take 1 capsule (100 mg total) by mouth 2 (two) times a day, Disp: 10 capsule, Rfl: 0    folic  "acid (FOLVITE) 1 mg tablet, Take 1 tablet (1 mg total) by mouth daily, Disp: 30 tablet, Rfl: 0    HYDROcodone Bit-Homatrop MBr (Hydromet) 5-1.5 mg/5 mL syrup, Hydromet 5 mg-1.5 mg/5 mL oral syrup, Disp: , Rfl:     Misc Natural Products (Turmeric Curcumin) CAPS, Take by mouth in the morning   (Patient not taking: Reported on 3/7/2023), Disp: , Rfl:     naproxen (EC NAPROSYN) 500 MG EC tablet, Take 1 tablet (500 mg total) by mouth 2 (two) times a day with meals for 14 days, Disp: 30 tablet, Rfl: 0    omeprazole (PriLOSEC) 20 mg delayed release capsule, TAKE 1 CAPSULE BY MOUTH EVERY DAY (Patient not taking: Reported on 2/27/2024), Disp: 90 capsule, Rfl: 0    oxyCODONE (ROXICODONE) 5 immediate release tablet, Take 1 tablets every 6 hrs as needed for pain control, Disp: 30 tablet, Rfl: 0    Patient Active Problem List   Diagnosis    Primary osteoarthritis of right hip    Migraine with aura    Asthma    GERD (gastroesophageal reflux disease)    H/O gastric sleeve    PONV (postoperative nausea and vomiting)    Status post total hip replacement, right       Objective:  Ht 5' 6.25\" (1.683 m)   Wt 85.3 kg (188 lb)   BMI 30.12 kg/m²     Ortho Exam    Physical Exam      Neurologic Exam    Procedures    I have personally reviewed the written report of the pertinent studies.   MRI Femur  IMPRESSION:    Small partial interstitial tear of the semimembranosus tendon at its origin with mild adjacent reactive marrow edema in the left ischial tuberosity. No muscle edema or disproportionate fatty atrophy.            Past Medical History:   Diagnosis Date    Arthritis     Right hip    Asthma     allergy induced    Environmental allergies     dust    GERD (gastroesophageal reflux disease)     Migraine with aura     Osteoarthritis     PONV (postoperative nausea and vomiting)     Seasonal allergies     Varicella 4 or 5 years old    Wears contact lenses     wears at night       Past Surgical History:   Procedure Laterality Date    ANKLE " SURGERY Left     ANKLE STABALIZATION    BARIATRIC SURGERY  3/2/21    Gastric sleeve    CHOLECYSTECTOMY  2015    COLONOSCOPY      JOINT REPLACEMENT  03/2013    Total left hip replacement    MI ARTHRP ACETBLR/PROX FEM PROSTC AGRFT/ALGRFT Right 04/27/2022    Procedure: ARTHROPLASTY HIP TOTAL ANTERIOR;  Surgeon: Cindi Vences MD;  [facility];  Service: Orthopedics    MI LAPS [facility] RSTRICTIV PX LONGITUDINAL GASTRECTOMY N/A 03/02/2021    Procedure: ROBOTIC SLEEVE GASTRECTOMY; ROBOTIC PARAESOPHAGEAL HERNIA REPAIR;  Surgeon: Blanca Shah MD;  [facility];  Service: Bariatrics    TOOTH EXTRACTION      TOTAL HIP ARTHROPLASTY Left        Social History     Socioeconomic History    Marital status: Single     Spouse name: Not on file    Number of children: Not on file    Years of education: Not on file    Highest education level: Not on file   Occupational History     Employer: [employer]   Tobacco Use    Smoking status: Never    Smokeless tobacco: Never   Vaping Use    Vaping status: Never Used   Substance and Sexual Activity    Alcohol use: Not Currently     Comment: Socially    Drug use: No    Sexual activity: Not Currently     Partners: Male     Birth control/protection: Abstinence, Ring   Other Topics Concern    Not on file   Social History Narrative    DAILY COFFEE CONSUMPTION (0) CUPS A DAY    SEDENTARY LIFESTYLE     Social Determinants of Health     Financial Resource Strain: Not on file   Food Insecurity: Not on file   Transportation Needs: Not on file   Physical Activity: Not on file   Stress: Not on file   Social Connections: Not on file   Intimate Partner Violence: Not on file   Housing Stability: Not on file       Family History   Problem Relation Age of Onset    Arthritis Mother     Hypertension Mother     Skin cancer Mother     Cancer Father         Prostate    Diabetes Father         MELLITUS    Hypertension Father     Anemia Sister     Celiac disease Sister     Thyroid disease  Sister     Varicose Veins Sister         LOWER EXTREMITIES    Breast cancer Sister 44    Cancer Sister         Breast cancer    Liver cancer Maternal Grandmother 80    Diabetes Maternal Grandmother     Cancer Maternal Grandmother         Liver    No Known Problems Maternal Grandfather     Diabetes Paternal Grandmother         MELLIYUS    Dementia Paternal Grandmother     Diabetes Paternal Grandfather         MELLITUS    Stroke Paternal Grandfather     Stroke Paternal Uncle     Heart disease Neg Hx

## 2024-02-27 NOTE — PATIENT INSTRUCTIONS
PRP (Platelet Rich Plasma)    What is PRP?  Platelet?rich plasma is a fraction of your blood which contains a higher concentration of platelets than whole blood. PRP therapy involves the injection of this platelet rich concentrate, rich in growth factors and nutrients, to enhance the natural healing response of an injured tissue.    What are the different orthopedic conditions for which PRP may be used?  PRP therapy may be used for conditions like osteoarthritis (degenerative joint disease), chronic tendinitis or chronic ligament and muscle injuries. Some examples include knee osteoarthritis, lateral (tennis elbow) or medial epicondylitis (golfer's elbow) of elbow, patellar or Achilles tendinitis, plantar fasciitis, chronic ligament sprains, etc.    Is PRP therapy right for you?  If you have tried conventional treatment methods (e.g., physical therapy, oral pain medications, bracing, etc.) and persist with chronic joint, muscle or tendon pain; then PRP therapy may be an option. Since every individual and condition is unique, it's best to be clinically evaluated by your physician to determine if PRP is a good option for you.    What are some contraindications for PRP therapy?  It is best to discuss your medical conditions and medications with your physician prior to receiving PRP therapy. Some medical conditions may not be appropriate for PRP therapy e.g., blood disorders, anemia or low platelet count, immunological problems, cancer, active or prolonged infections, pregnancy, chronic smoking.  Certain medications may lower the efficacy of PRP therapy. Examples include aspirin, NSAID's (Motrin, Advil, Aleve, Mobic/ ibuprofen, naproxen, diclofenac, meloxicam, etc.), blood thinners.     What is the scientific evidence for PRP therapy?  The scientific evidence for PRP is evolving. The position statement from [employer] for Sports [employer], published in 2021 indicates that PRP is effective in reducing pain  and improving function in patients with knee osteoarthritis, particularly in those with mild to moderate disease or younger individuals. Evidence for osteoarthritis of other joints is evolving. There is also some evidence for pain reduction in conditions such as lateral epicondylitis and other tendonitis. The evidence regarding PRP use for muscle or ligament injuries is limited.     What are the risks associated with PRP injection?  PRP injections are minimal risk since it is your own blood. Common risks include transient injection site increased pain, infection risk. You should discuss the potential risks for your PRP injection with your physician since it could vary based on your specific condition and injection site.      What can I do to optimize my PRP therapy?  Pre-procedure:   Discuss with your physician if you need to withhold any medications e.g., blood thinners, pain medications before the procedure.  Stay well hydrated on the day of procedure.  Inform physician of any recent medication change or infection.  If able to, perform some light exercise prior to the procedure. Some studies show improved platelet concentration following physical activity.  Post-procedure:  Avoid/ minimize taking NSAID's and blood thinners as per the direction of your treating physician.  Keep the injection area clean and dry and avoid submerging under water e.g., bathtub, swimming pool, ocean water for 4-5 days. May take a shower the next day.  You may experience some increased discomfort in the area where PRP was injected. This may last from a few days to 2-3 weeks. You can apply local ice for comfort and discuss medication options with your physician for any increased pain.  It is advisable to have someone accompany you on the office visit as you may need help with driving following the injection.   Avoid strenuous exercise of the injected area until symptoms improve and following the guidance of your treating physician/  physical therapist.  Contact the treating physician immediately if there is significant increase in injection area pain associated with spreading redness, fever, or chills.     How is the procedure performed?  A blood sample is taken from the patient like having a lab test for blood. This sample is then placed in a centrifuge machine which separates the blood into different components. One component, platelet rich plasm (PRP) is carefully extracted from the centrifuged sample. This is then injected in the tissue/joint of concern. Sterile precautions are maintained throughout the procedure. Your physician may use an ultrasound machine to accurately deliver the injection into the tissue/joint of concern.     Do I need to do physical therapy after the PRP injection?  Several studies have demonstrated synergistic effect of physical therapy with PRP injection. Physical therapy after PRP is typically customized based on patient's individual scenario. Discuss with your physician about the need and duration of physical therapy following your PRP injection.    When can I expect improvement of symptoms, and will I need further PRP injections?  Symptom improvement can be variable among individuals. Commonly, most people will start noticing improvement in 6-8 weeks following the injection. If there is partial improvement of symptoms, you may opt for another PRP injection typically after 6-8 weeks.    Insurance coverage/Cost  Most traditional insurance companies currently do not cover the cost of PRP injection as it is not “FDA approved” and considered “FDA cleared”. Since PRP is derived from a patient's own blood, it is not considered a drug by FDA. “FDA clearance” means that a device has been noted to be “substantially equivalent” to a currently marketed device. The cost of injection may vary depending on the type and number of PRP injections. Please confirm from your physician's office the exact cost and mode of payment for  your PRP injection prior to scheduling the procedure.

## 2024-03-01 LAB
VIT A SERPL-MCNC: 27.3 UG/DL (ref 20.1–62)
VIT B1 BLD-SCNC: 148.5 NMOL/L (ref 66.5–200)
ZINC SERPL-MCNC: 88 UG/DL (ref 44–115)

## 2024-03-05 ENCOUNTER — PREP FOR PROCEDURE (OUTPATIENT)
Dept: BARIATRICS | Facility: CLINIC | Age: 44
End: 2024-03-05

## 2024-03-05 ENCOUNTER — OFFICE VISIT (OUTPATIENT)
Dept: BARIATRICS | Facility: CLINIC | Age: 44
End: 2024-03-05
Payer: COMMERCIAL

## 2024-03-05 VITALS
WEIGHT: 178 LBS | DIASTOLIC BLOOD PRESSURE: 72 MMHG | HEIGHT: 67 IN | HEART RATE: 77 BPM | SYSTOLIC BLOOD PRESSURE: 110 MMHG | BODY MASS INDEX: 27.94 KG/M2 | TEMPERATURE: 97.5 F

## 2024-03-05 DIAGNOSIS — K91.2 POSTSURGICAL MALABSORPTION: ICD-10-CM

## 2024-03-05 DIAGNOSIS — R63.5 ABNORMAL WEIGHT GAIN: Primary | ICD-10-CM

## 2024-03-05 DIAGNOSIS — Z48.815 ENCOUNTER FOR SURGICAL AFTERCARE FOLLOWING SURGERY OF DIGESTIVE SYSTEM: Primary | ICD-10-CM

## 2024-03-05 DIAGNOSIS — Z98.84 BARIATRIC SURGERY STATUS: ICD-10-CM

## 2024-03-05 PROCEDURE — 99213 OFFICE O/P EST LOW 20 MIN: CPT | Performed by: PHYSICIAN ASSISTANT

## 2024-03-05 NOTE — PATIENT INSTRUCTIONS
Follow-up in after EGD. We kindly ask that your arrive 15 minutes before your scheduled appointment time with your provider to allow our staff to room you, get your vital signs and update your chart.      Call our office if you have any problems with abdominal pain especially associated with fever, chills, nausea, vomiting or any other concerns.    All  Post-bariatric surgery patients should be aware that very small quantities of any alcohol can cause impairment and it is very possible not to feel the effect. The effect can be in the system for several hours.  It is also a stomach irritant.     It is advised to AVOID alcohol, Nonsteroidal antiinflammatory drugs (NSAIDS) and nicotine of all forms . Any of these can cause stomach irritation/pain.    Discussed the effects of alcohol on a bariatric patient and the increased impairment risk.     Keep up the good work!

## 2024-03-05 NOTE — H&P (VIEW-ONLY)
Assessment/Plan:     Patient ID: Jazmine Tran is a 43 y.o. female.     Bariatric Surgery Status    -s/p Vertical Sleeve Gastrectomy with Dr. Dakota Shah on 3/2/2021. . Presents to the office today for annual.overall doing well     With regards to her reflux, overall stable. She is taking prilosec a few times a week which is overall managing her symptoms. She reports she still has  intermittent burning and belching after meals on occasion. She had UGI 9/2022 which showed:  IMPRESSION:     Small sliding hiatal hernia.     Postoperative changes of sleeve gastrectomy.    PLAN:  - pt due for screening EGD. Will schedule to eval anatomy, follow up after   - continue prilosec     Continued/Maintain healthy weight loss with good nutrition intakes.  Adequate hydration with at least 64oz. fluid intake.  Follow diet as discussed.  Follow vitamin and mineral recommendations as reviewed with you.  Exercise as tolerated.    Colonoscopy referral made: utd  Mammo: utd     Follow-up in after EGD. We kindly ask that your arrive 15 minutes before your scheduled appointment time with your provider to allow our staff to room you, get your vital signs and update your chart.      Call our office if you have any problems with abdominal pain especially associated with fever, chills, nausea, vomiting or any other concerns.    All  Post-bariatric surgery patients should be aware that very small quantities of any alcohol can cause impairment and it is very possible not to feel the effect. The effect can be in the system for several hours.  It is also a stomach irritant.     It is advised to AVOID alcohol, Nonsteroidal antiinflammatory drugs (NSAIDS) and nicotine of all forms . Any of these can cause stomach irritation/pain.    Discussed the effects of alcohol on a bariatric patient and the increased impairment risk.     Keep up the good work!     Postsurgical Malabsorption   -At risk for malabsorption of vitamins/minerals secondary to  "malabsorption and restriction of intake from bariatric surgery  -Currently taking adequate postop bariatric surgery vitamin supplementation  -Last set of bariatric labs completed 2/2024 and wnl   -Patient received education about the importance of adhering to a lifelong supplementation regimen to avoid vitamin/mineral deficiencies      Diagnoses and all orders for this visit:    Encounter for surgical aftercare following surgery of digestive system    Bariatric surgery status    Postsurgical malabsorption         Subjective:      Patient ID: Jazmine Tran is a 43 y.o. female.    -s/p Vertical Sleeve Gastrectomy with Dr. Dakota Shah on 3/2/2021. . Presents to the office today for annual.overall doing well    Initial: 265  Current: 178  EWL: (Weight loss is ahead of schedule at this post surgical period.)  Rohith: current   Current BMI is Body mass index is 27.88 kg/m².    Tolerating a regular diet-yes  Eating at least 60 grams of protein per day-yes  Drinking at least 64 ounces of fluid per day-yes  Sufficient exercise-yes  Using NSAIDs regularly-occasional   Using nicotine-no  Using alcohol-social  Supplements: antonia mvi + calcium + vitron c    EWL is 81%, which places the patient ahead of schedule for expected post surgical weight loss at this time.     The following portions of the patient's history were reviewed and updated as appropriate: allergies, current medications, past family history, past medical history, past social history, past surgical history and problem list.    Review of Systems   Constitutional: Negative.    Respiratory: Negative.     Cardiovascular: Negative.    Gastrointestinal: Negative.    Neurological: Negative.    Psychiatric/Behavioral: Negative.           Objective:    /72   Pulse 77   Temp 97.5 °F (36.4 °C) (Tympanic)   Ht 5' 7\" (1.702 m)   Wt 80.7 kg (178 lb)   BMI 27.88 kg/m²      Physical Exam  Vitals and nursing note reviewed.   Constitutional:       Appearance: Normal " appearance.   HENT:      Head: Normocephalic and atraumatic.   Eyes:      Extraocular Movements: Extraocular movements intact.      Pupils: Pupils are equal, round, and reactive to light.   Cardiovascular:      Rate and Rhythm: Normal rate.   Pulmonary:      Effort: Pulmonary effort is normal.   Musculoskeletal:         General: Normal range of motion.      Cervical back: Normal range of motion.   Skin:     General: Skin is warm and dry.   Neurological:      General: No focal deficit present.      Mental Status: She is alert and oriented to person, place, and time.   Psychiatric:         Mood and Affect: Mood normal.

## 2024-03-13 NOTE — PROGRESS NOTES
PT Evaluation     Today's date: 3/14/2024  Patient name: Jazmine Tran  : 1980  MRN: [id number]  Referring provider: Taiwo Hillman MD  Dx:   Encounter Diagnosis     ICD-10-CM    1. Pain of left thigh  M79.652 Ambulatory Referral to Physical Therapy      2. Hamstring muscle strain, left, initial encounter  S76.312A Ambulatory Referral to Physical Therapy          Start Time: 0708  Stop Time: 0752  Total time in clinic (min): 44 minutes    Assessment  Assessment details: Jazmine Tran is a 43 y.o. year old female with a referred dx of pain of left thigh, and Hamstring muscle strain, left, initial encounter. Pt presents with pain with functional activities such as fast-paced walking and prolonged sitting. Upon further clinical testing, pt demonstrates decreased proximal hip strength, painful hamstring length, and painful sqaut. Pt would benefit from skilled OP PT to address these, and the below impairments in order to optimize outcomes and promote return to functional baseline.     Pt able to demonstrate HEP with good technique and no pain. Educated pt to stop any exercises causing pain increase, pt verbalizes understanding.       Impairments: abnormal or restricted ROM, activity intolerance, impaired physical strength, lacks appropriate home exercise program, pain with function, weight-bearing intolerance and poor body mechanics    Goals    Short Term Goals:  In 4 weeks, the patient will:  1. Decrease worst pain by 2 points for improved QOL.  2. Improve proximal hip strength by 1/2 grade for improved LE fx.  3. Supervision with HEP for self care.    Long Term Goals:  In 8 weeks, the patient will:  1. Improve hip abd MMT strength to 5/5 for improved functional strength.  2. FOTO to greater than predicted value.  3. Independent with comprehensive HEP upon discharge.  4. Report less pain with fast-paced walking for improved tolerance to PLOF.       Plan  Plan details: Followup in 2 weeks to assess tolerance  to self-management with HEP.   Patient would benefit from: skilled physical therapy  Referral necessary: No  Planned modality interventions: cryotherapy  Planned therapy interventions: activity modification, joint mobilization, manual therapy, ADL retraining, neuromuscular re-education, strengthening, stretching, therapeutic activities, therapeutic exercise, home exercise program, graded exercise, functional ROM exercises, flexibility and body mechanics training  Duration in weeks: 8  Plan of Care beginning date: 3/14/2024  Plan of Care expiration date: 2024  Treatment plan discussed with: patient        Subjective Evaluation    History of Present Illness  Mechanism of injury: Pt reports chronic hx of L hamstring pain since trip/awkward step in . Pain increased 2-4x/year when increasing activity levels. Pain has been worsening recently, is localized to posterior hip/glute. Feels her flexibility is decreased. Pain worse with walking at fast pace, prolonged sitting at work, and crossing L over right leg. Denies LBP, distal LE pain or numbness/tingling. No pain with sleep.     Hx of 2013 Left STEPHANIE, R STEPHANIE on 22.   Patient Goals  Patient goals for therapy: decreased pain, increased strength, independence with ADLs/IADLs and return to sport/leisure activities (to find out how to make sure it doesn't flare up)    Pain  Current pain ratin  At best pain ratin  At worst pain ratin  Quality: pulling          Objective  OBJECTIVE:    Standing Posture & Lower Extremity Alignment:  Structure/Joint         Pelvis (Tilt) x Anterior  Neutral  Posterior   Iliac Crests  Right Elevated x Neutral  Left Elevated   Knee - Frontal   Genuvalgum x Neutral  Genuvarum   Knee - Sagittal  Genurecurvatum x Neutral       Range of Motion: Goniometric revealed the following findings (in degrees).  Joint Motion Right: 3/14/2024 Left: 3/14/2024   Hip Flexion WFL WFL   Hip External Rotation 45 45   Hip Internal Rotation 45 45  "  Knee Extension WFL WFL   Knee Flexion WFL WFL     Strength: MMT revealed the following findings.  Joint Motion Right: 3/14/2024 Left: 3/14/2024   Hip Flexion 5/5 5/5   Hip Abduction 4-/5 4-/5   Hip Extension 4/5 4/5   Knee Extension 5/5 5/5   Knee Flexion 5/5 4/5, increased pain w/ tibial IR bias   Ankle Plantarflexion 5/5 5/5   Ankle Dorsiflexion 5/5 5/5   *=indicates pain with testing    Additional Assessments:  Palpation: TTP at L ischial tuberosity, proximal hamstring tendon, no TTP throughout L/s  Observation: no visible deformity/swelling  Gait Pattern: WFL  Balance: WFL SLS  Squat: good technique, notes pain  Lumbar screen: unremarkable, no change with repeated movements.     Lower quarter screen: unremarkable     Special Tests:  Test (Structure evaluated) Date: 3/14/2024  ( P / N )   Alvin (Iliapsoas/Rectus Fem) neg   Juan (ITB) neg   90/90 SLR (Hamstring) Neg (painful)   Ely (Rectus Femoris) neg          POC expires Unit limit Auth Expiration date PT/OT + Visit Limit?   5/9/24 N/a  N/a 30 PCY, 22 left     Visit/Unit Tracking  AUTH Status:  Date 3/14      30 PCY, (22 left) Used 1       Remaining  21         HEP access code: [email or web address]  Pertinent Findings:      Test / Measure  3/14/2024   FOTO (Predicted 78) 65   Knee flexion MMT 4/5, pain with IR   Hip abd MMT 4-/5   90/90 hamstring length painful     Precautions: Hx of 2013 Left STEPHANIE, R STEPHANIE on 4/27/22, both anterior approach     VISIT 1    Manuals 3/13                        Neuro Re-Ed     Seated HS iso 5\"x10 hep    DL     RDL     S/L RDL                     Ther Ex     Bike     S/L bridge X10 hep    S/L hip abd X10 hep     Bridge+ HS slider X10 hep     Seated tband HC curl X10 mtb hep    Brdige +HS curl     Reverse hyper alt LE          Ther Activity     Tband side step     FSU     LSU          Gait Training               Modalities                    "

## 2024-03-14 ENCOUNTER — EVALUATION (OUTPATIENT)
Dept: PHYSICAL THERAPY | Facility: REHABILITATION | Age: 44
End: 2024-03-14
Payer: COMMERCIAL

## 2024-03-14 DIAGNOSIS — S76.312A HAMSTRING MUSCLE STRAIN, LEFT, INITIAL ENCOUNTER: ICD-10-CM

## 2024-03-14 DIAGNOSIS — M79.652 PAIN OF LEFT THIGH: Primary | ICD-10-CM

## 2024-03-14 PROCEDURE — 97161 PT EVAL LOW COMPLEX 20 MIN: CPT

## 2024-03-14 PROCEDURE — 97110 THERAPEUTIC EXERCISES: CPT

## 2024-03-19 ENCOUNTER — TELEPHONE (OUTPATIENT)
Dept: BARIATRICS | Facility: CLINIC | Age: 44
End: 2024-03-19

## 2024-03-26 RX ORDER — SODIUM CHLORIDE 9 MG/ML
125 INJECTION, SOLUTION INTRAVENOUS CONTINUOUS
Status: CANCELLED | OUTPATIENT
Start: 2024-03-26

## 2024-03-27 ENCOUNTER — ANESTHESIA (OUTPATIENT)
Dept: GASTROENTEROLOGY | Facility: HOSPITAL | Age: 44
End: 2024-03-27

## 2024-03-27 ENCOUNTER — HOSPITAL ENCOUNTER (OUTPATIENT)
Dept: GASTROENTEROLOGY | Facility: HOSPITAL | Age: 44
Setting detail: OUTPATIENT SURGERY
Discharge: HOME | End: 2024-03-27
Attending: SURGERY | Admitting: SURGERY
Payer: COMMERCIAL

## 2024-03-27 ENCOUNTER — TELEPHONE (OUTPATIENT)
Age: 44
End: 2024-03-27

## 2024-03-27 ENCOUNTER — ANESTHESIA EVENT (OUTPATIENT)
Dept: GASTROENTEROLOGY | Facility: HOSPITAL | Age: 44
End: 2024-03-27

## 2024-03-27 VITALS
SYSTOLIC BLOOD PRESSURE: 101 MMHG | BODY MASS INDEX: 27.94 KG/M2 | HEART RATE: 58 BPM | HEIGHT: 67 IN | DIASTOLIC BLOOD PRESSURE: 58 MMHG | TEMPERATURE: 96.4 F | OXYGEN SATURATION: 99 % | RESPIRATION RATE: 16 BRPM | WEIGHT: 178 LBS

## 2024-03-27 DIAGNOSIS — Z90.3 H/O GASTRIC SLEEVE: ICD-10-CM

## 2024-03-27 DIAGNOSIS — R63.5 ABNORMAL WEIGHT GAIN: ICD-10-CM

## 2024-03-27 DIAGNOSIS — Z96.641 STATUS POST TOTAL HIP REPLACEMENT, RIGHT: Primary | ICD-10-CM

## 2024-03-27 LAB
EXT PREGNANCY TEST URINE: NEGATIVE
EXT. CONTROL: NORMAL

## 2024-03-27 PROCEDURE — 81025 URINE PREGNANCY TEST: CPT | Performed by: ANESTHESIOLOGY

## 2024-03-27 PROCEDURE — 88305 TISSUE EXAM BY PATHOLOGIST: CPT | Performed by: PATHOLOGY

## 2024-03-27 PROCEDURE — 43239 EGD BIOPSY SINGLE/MULTIPLE: CPT | Performed by: SURGERY

## 2024-03-27 RX ORDER — SODIUM CHLORIDE 9 MG/ML
125 INJECTION, SOLUTION INTRAVENOUS CONTINUOUS
Status: DISCONTINUED | OUTPATIENT
Start: 2024-03-27 | End: 2024-03-31 | Stop reason: HOSPADM

## 2024-03-27 RX ORDER — PROPOFOL 10 MG/ML
INJECTION, EMULSION INTRAVENOUS AS NEEDED
Status: DISCONTINUED | OUTPATIENT
Start: 2024-03-27 | End: 2024-03-27

## 2024-03-27 RX ORDER — LIDOCAINE HYDROCHLORIDE 10 MG/ML
INJECTION, SOLUTION EPIDURAL; INFILTRATION; INTRACAUDAL; PERINEURAL AS NEEDED
Status: DISCONTINUED | OUTPATIENT
Start: 2024-03-27 | End: 2024-03-27

## 2024-03-27 RX ADMIN — LIDOCAINE HYDROCHLORIDE 60 MG: 10 INJECTION, SOLUTION EPIDURAL; INFILTRATION; INTRACAUDAL; PERINEURAL at 10:31

## 2024-03-27 RX ADMIN — PROPOFOL 50 MG: 10 INJECTION, EMULSION INTRAVENOUS at 10:33

## 2024-03-27 RX ADMIN — SODIUM CHLORIDE 125 ML/HR: 0.9 INJECTION, SOLUTION INTRAVENOUS at 09:56

## 2024-03-27 RX ADMIN — PROPOFOL 150 MG: 10 INJECTION, EMULSION INTRAVENOUS at 10:31

## 2024-03-27 NOTE — INTERVAL H&P NOTE
H&P reviewed. After examining the patient I find no changes in the patients condition since the H&P had been written.    Vitals:    03/27/24 0954   BP: 119/73   Pulse: (!) 51   Resp: 16   Temp: (!) 96.4 °F (35.8 °C)   SpO2: 100%

## 2024-03-27 NOTE — ANESTHESIA PREPROCEDURE EVALUATION
Procedure:  EGD    Relevant Problems   ANESTHESIA   (+) PONV (postoperative nausea and vomiting)      CARDIO   (+) Migraine with aura      GI/HEPATIC   (+) GERD (gastroesophageal reflux disease)      MUSCULOSKELETAL   (+) Primary osteoarthritis of right hip      NEURO/PSYCH   (+) Migraine with aura      PULMONARY   (+) Asthma        Physical Exam    Airway    Mallampati score: I  TM Distance: >3 FB  Neck ROM: full     Dental   No notable dental hx     Cardiovascular  Rhythm: regular, Rate: normal, Cardiovascular exam normal    Pulmonary  Pulmonary exam normal Breath sounds clear to auscultation    Other Findings  post-pubertal.      Anesthesia Plan  ASA Score- 2     Anesthesia Type- IV sedation with anesthesia with ASA Monitors.         Additional Monitors:     Airway Plan:            Plan Factors-Exercise tolerance (METS): >4 METS.    Chart reviewed.    Patient summary reviewed.    Patient is not a current smoker.  Patient did not smoke on day of surgery.            Induction- intravenous.    Postoperative Plan-     Informed Consent- Anesthetic plan and risks discussed with patient.

## 2024-03-27 NOTE — ANESTHESIA POSTPROCEDURE EVALUATION
"Post-Op Assessment Note    CV Status:  Stable    Pain management: adequate       Mental Status:  Alert and awake   Hydration Status:  Euvolemic   PONV Controlled:  Controlled   Airway Patency:  Patent     Post Op Vitals Reviewed: Yes    No anethesia notable event occurred.    Staff: Anesthesiologist               BP      Temp      Pulse     Resp      SpO2      /58   Pulse 58   Temp (!) 96.4 °F (35.8 °C) (Temporal)   Resp 16   Ht 5' 7\" (1.702 m)   Wt 80.7 kg (178 lb)   LMP 01/22/2024   SpO2 99%   BMI 27.88 kg/m²     "

## 2024-03-27 NOTE — TELEPHONE ENCOUNTER
Patient was advised to reschedule appointment on 4/2/24 with Sneha Flowers to be scheduled with Dr. Dakota Shah. Warm transferred call to the office for further assistance.

## 2024-03-29 ENCOUNTER — OFFICE VISIT (OUTPATIENT)
Dept: BARIATRICS | Facility: CLINIC | Age: 44
End: 2024-03-29
Payer: COMMERCIAL

## 2024-03-29 ENCOUNTER — OFFICE VISIT (OUTPATIENT)
Dept: PHYSICAL THERAPY | Facility: REHABILITATION | Age: 44
End: 2024-03-29
Payer: COMMERCIAL

## 2024-03-29 VITALS
BODY MASS INDEX: 28.33 KG/M2 | WEIGHT: 180.5 LBS | HEART RATE: 63 BPM | DIASTOLIC BLOOD PRESSURE: 70 MMHG | HEIGHT: 67 IN | TEMPERATURE: 97.8 F | SYSTOLIC BLOOD PRESSURE: 116 MMHG

## 2024-03-29 DIAGNOSIS — Z98.84 BARIATRIC SURGERY STATUS: Primary | ICD-10-CM

## 2024-03-29 DIAGNOSIS — R14.2 BURPING: ICD-10-CM

## 2024-03-29 DIAGNOSIS — Z01.810 PREOP CARDIOVASCULAR EXAM: Primary | ICD-10-CM

## 2024-03-29 DIAGNOSIS — S76.312A HAMSTRING MUSCLE STRAIN, LEFT, INITIAL ENCOUNTER: ICD-10-CM

## 2024-03-29 DIAGNOSIS — K21.00 GASTROESOPHAGEAL REFLUX DISEASE WITH ESOPHAGITIS WITHOUT HEMORRHAGE: ICD-10-CM

## 2024-03-29 DIAGNOSIS — M79.652 PAIN OF LEFT THIGH: Primary | ICD-10-CM

## 2024-03-29 PROCEDURE — 97530 THERAPEUTIC ACTIVITIES: CPT

## 2024-03-29 PROCEDURE — 97112 NEUROMUSCULAR REEDUCATION: CPT

## 2024-03-29 PROCEDURE — 99213 OFFICE O/P EST LOW 20 MIN: CPT | Performed by: SURGERY

## 2024-03-29 PROCEDURE — 97110 THERAPEUTIC EXERCISES: CPT

## 2024-03-29 NOTE — PROGRESS NOTES
OFFICE VISIT - BARIATRIC SURGERY  Jazmine Tran 43 y.o. female MRN: [id number]  Unit/Bed#:  Encounter: [id number]      HPI:  Jazmine Tran is a 43 y.o. female status post robotic sleeve gastrectomy and hiatal hernia repair without mesh by Dr. Guallpa on 3/2/2021. Comes to the office for EGD review    Subjective   Patient has been having issues with constant burping which is her main complaint. She does have issues with heartburn but it seems to be well managed with prilosec. She does not recall when her burping started but she has had it for some time and is annoyed with how frequently she burps.  Discussed that she has a hiatal hernia about 3 cm which is likely the culprit of her symptoms and she is interested in repair      Review of Systems   All other systems reviewed and are negative.      Historical Information   Past Medical History:   Diagnosis Date    Arthritis     Right hip    Asthma     allergy induced    Environmental allergies     dust    GERD (gastroesophageal reflux disease)     Migraine with aura     Osteoarthritis     PONV (postoperative nausea and vomiting)     Seasonal allergies     Varicella 4 or 5 years old    Wears contact lenses     wears at night     Past Surgical History:   Procedure Laterality Date    ANKLE SURGERY Left     ANKLE STABALIZATION    BARIATRIC SURGERY  3/2/21    Gastric sleeve    CHOLECYSTECTOMY  2015    COLONOSCOPY      JOINT REPLACEMENT  03/2013    Total left hip replacement    MA ARTHRP ACETBLR/PROX FEM PROSTC AGRFT/ALGRFT Right 04/27/2022    Procedure: ARTHROPLASTY HIP TOTAL ANTERIOR;  Surgeon: Cindi Vences MD;  Location: [facility];  Service: Orthopedics    MA LAPS [facility] RSTRICTIV PX LONGITUDINAL GASTRECTOMY N/A 03/02/2021    Procedure: ROBOTIC SLEEVE GASTRECTOMY; ROBOTIC PARAESOPHAGEAL HERNIA REPAIR;  Surgeon: Blanca Shah MD;  Location: [facility];  Service: Bariatrics    TOOTH EXTRACTION      TOTAL HIP ARTHROPLASTY Left      Social History   Social History  "    Substance and Sexual Activity   Alcohol Use Not Currently    Comment: Socially     Social History     Substance and Sexual Activity   Drug Use No     Social History     Tobacco Use   Smoking Status Never   Smokeless Tobacco Never       Objective       Current Vitals:   Blood Pressure: 116/70 (03/29/24 0947)  Pulse: 63 (03/29/24 0947)  Temperature: 97.8 °F (36.6 °C) (03/29/24 0947)  Temp Source: Tympanic (03/29/24 0947)  Height: 5' 7\" (170.2 cm) (03/29/24 0947)  Weight - Scale: 81.9 kg (180 lb 8 oz) (03/29/24 0947)    Invasive Devices       None                   Physical Exam  Constitutional:       Appearance: Normal appearance.   HENT:      Head: Atraumatic.   Cardiovascular:      Rate and Rhythm: Normal rate.      Pulses: Normal pulses.   Pulmonary:      Effort: Pulmonary effort is normal.   Abdominal:      Palpations: Abdomen is soft.   Skin:     General: Skin is warm.   Neurological:      General: No focal deficit present.      Mental Status: She is alert.           Pathology, and Other Studies: I have personally reviewed pertinent reports.        Assessment/PLAN:    Jazmine Tran is a 43 y.o. female status post robotic sleeve gastrectomy and hiatal hernia repair in 2021 with Dr. Guallpa.    Workup thus far reviewed and discussed with patient:    UGI        EGD  Result Text   [facility]  [address] [address]  [phone]        DATE OF SERVICE:  3/27/24     PHYSICIAN(S):  Attending:   No Staff Documented      Fellow:   No Staff Documented         INDICATION:  Abnormal weight gain     POST-OP DIAGNOSIS:  See the impression below.     PREPROCEDURE:  Informed consent was obtained for the procedure, including sedation.  Risks of perforation, hemorrhage, adverse drug reaction and aspiration were discussed. The patient was placed in the left lateral decubitus position.     Patient was explained about the risks and benefits of the procedure. Risks including but not " limited to bleeding, infection, and perforation were explained in detail. Also explained about less than 100% sensitivity with the exam and other alternatives.     PROCEDURE: EGD     DETAILS OF PROCEDURE:   Patient was taken to the procedure room where a time out was performed to confirm correct patient and correct procedure. The patient underwent monitored anesthesia care, which was administered by an anesthesia professional. The patient's blood pressure, heart rate, level of consciousness, respirations, oxygen, ECG and ETCO2 were monitored throughout the procedure. The scope was introduced through the mouth and advanced to the second part of the duodenum. Retroflexion was performed in the fundus. The patient experienced no blood loss. The procedure was not difficult. The patient tolerated the procedure well. There were no apparent adverse events.      ANESTHESIA INFORMATION:  ASA: II  Anesthesia Type: IV Sedation with Anesthesia     MEDICATIONS:  Totals unavailable because the procedure time range is not set         FINDINGS:  Irregular Z-line 36 cm from the incisors  Medium sliding hiatal hernia (type I hiatal hernia) - GE junction 36 cm from the incisors, diaphragmatic impression 39 cm from the incisors  Moderate abnormal mucosa in the esophagus. Esophagitis grade B  Previous sleeve gastrectomy  Performed single forceps biopsy in the antrum to rule out H. pylori  The stomach and 2nd part of the duodenum appeared normal.  Abnormal mucosa with erosion in the body of the stomach        SPECIMENS:  * No specimens in log *        IMPRESSION:  Irregular Z-line  Medium type I hiatal hernia  Moderate abnormal mucosa in the esophagus  Previous sleeve gastrectomy  Performed forceps biopsy in the antrum to rule out H. pylori  The stomach and 2nd part of the duodenum appeared normal.  Abnormal mucosa with erosion in the body of the stomach        RECOMMENDATION:    There is no recommended follow-up for this procedure.                   No Staff Documented            Pathology from EGD         MANOMETRY/pH Study        GASTRIC EMPTYING STUDY      --------------------------------------------------------------------    Will plan for robotic hiatal hernia repair possible mesh placement  Will need cardiac clearance and lab work              Silver Avery MD  Bariatric Surgery  3/29/2024  9:57 AM

## 2024-03-29 NOTE — PROGRESS NOTES
"Daily Note     Today's date: 3/29/2024  Patient name: Jazmine Tran  : 1980  MRN: [id number]  Referring provider: Taiwo Hillman MD  Dx:   Encounter Diagnosis     ICD-10-CM    1. Pain of left thigh  M79.652       2. Hamstring muscle strain, left, initial encounter  S76.312A           Start Time: 0702  Stop Time: 0745  Total time in clinic (min): 43 minutes    Subjective: Pt reports HEP going well, has been feeling \"ache\" in posterior leg but subsides quickly with rest. Pt requesting updated HEP today and will continue to self-manage.       Objective: See treatment diary below      Assessment: Tolerated treatment well. Updated HEP to include increased HS and functional strengthening, pt able to demonstrate all exercises with good technique and no pain. Reviewed HEP, pt will continue to self-manage and will f/u in 2-3 weeks via phone. Patient would benefit from continued PT. 1:1 with Bob Darling DPT for entirety of tx.       Plan: Continue per plan of care. Case will be left open for 30 days, pt will f/u via phone in 2-3 weeks to discuss tolerance to self-management.      POC expires Unit limit Auth Expiration date PT/OT + Visit Limit?   24 N/a  N/a 30 PCY, 22 left     Visit/Unit Tracking  AUTH Status:  Date 3/14 3/29     30 PCY, (22 left) Used 1 2      Remaining  21 20        HEP access code: [email or web address] (3/29)  Pertinent Findings:      Test / Measure  3/14/2024   FOTO (Predicted 78) 65   Knee flexion MMT 4/5, pain with IR   Hip abd MMT 4-/5   90/90 hamstring length painful     Precautions: Hx of  Left STEPHANIE, R STEPHANIE on 22, both anterior approach     VISIT 1 2   Manuals 3/13 3/29                       Neuro Re-Ed     Seated HS iso 5\"x10 hep    DL  2x10 15# rack pull HEP   RDL     S/L RDL   2x8 B 5# HEP                  Ther Ex     Bike  8' L1   S/L bridge X10 hep    S/L hip abd X10 hep     Bridge+ HS slider X10 hep     Seated tband HC curl X10 mtb hep    Brdige +HS curl  2x10   Reverse " "hyper alt LE  2x10         Ther Activity     Tband side step  X3 laps btb HEP   FSU  2x10 B 9\"   LSU  2x10 B 9\"        Gait Training               Modalities                    "

## 2024-04-01 PROCEDURE — 88305 TISSUE EXAM BY PATHOLOGIST: CPT | Performed by: PATHOLOGY

## 2024-04-30 ENCOUNTER — APPOINTMENT (OUTPATIENT)
Dept: LAB | Facility: CLINIC | Age: 44
End: 2024-04-30
Payer: COMMERCIAL

## 2024-04-30 DIAGNOSIS — Z98.84 BARIATRIC SURGERY STATUS: ICD-10-CM

## 2024-04-30 LAB
ERYTHROCYTE [DISTWIDTH] IN BLOOD BY AUTOMATED COUNT: 14 % (ref 11.6–15.1)
HCT VFR BLD AUTO: 40.5 % (ref 34.8–46.1)
HGB BLD-MCNC: 12.8 G/DL (ref 11.5–15.4)
MCH RBC QN AUTO: 28.8 PG (ref 26.8–34.3)
MCHC RBC AUTO-ENTMCNC: 31.6 G/DL (ref 31.4–37.4)
MCV RBC AUTO: 91 FL (ref 82–98)
PLATELET # BLD AUTO: 220 THOUSANDS/UL (ref 149–390)
PMV BLD AUTO: 11.2 FL (ref 8.9–12.7)
RBC # BLD AUTO: 4.44 MILLION/UL (ref 3.81–5.12)
WBC # BLD AUTO: 6.49 THOUSAND/UL (ref 4.31–10.16)

## 2024-04-30 PROCEDURE — 36415 COLL VENOUS BLD VENIPUNCTURE: CPT

## 2024-04-30 PROCEDURE — 80053 COMPREHEN METABOLIC PANEL: CPT

## 2024-04-30 PROCEDURE — 85027 COMPLETE CBC AUTOMATED: CPT

## 2024-05-01 ENCOUNTER — CONSULT (OUTPATIENT)
Dept: CARDIOLOGY CLINIC | Facility: CLINIC | Age: 44
End: 2024-05-01
Payer: COMMERCIAL

## 2024-05-01 VITALS
HEART RATE: 66 BPM | WEIGHT: 176 LBS | DIASTOLIC BLOOD PRESSURE: 78 MMHG | SYSTOLIC BLOOD PRESSURE: 114 MMHG | BODY MASS INDEX: 27.57 KG/M2

## 2024-05-01 DIAGNOSIS — Z01.810 PREOP CARDIOVASCULAR EXAM: ICD-10-CM

## 2024-05-01 PROCEDURE — 99204 OFFICE O/P NEW MOD 45 MIN: CPT | Performed by: INTERNAL MEDICINE

## 2024-05-01 PROCEDURE — 93000 ELECTROCARDIOGRAM COMPLETE: CPT | Performed by: INTERNAL MEDICINE

## 2024-05-01 NOTE — PATIENT INSTRUCTIONS
CARDIOLOGY ASSESSMENT & PLAN:   Diagnosis ICD-10-CM Associated Orders   1. Preop cardiovascular exam  Z01.810 Ambulatory Referral to Cardiology     POCT ECG        1. Preop cardiovascular exam  Assessment & Plan:  Ms. Jazmine Tran is being considered for elective hernia repair surgery under general  anesthesia, procedure with inherent intermediate cardiac risk.  She has no active cardiac conditions.  She has no major cardiac risk factors..  Her blood pressure is normal.  On physical examination there is no evidence of significant valvular heart disease or signs of heart failure.  Her renal function is normal.  Her exercise tolerance is good.  Her ECG is normal.    Her overall risk for major adverse cardiac events relating to plan procedure is low to moderate due to her age.    -- In the absence of symptoms and normal examination no further testing is needed.  -- She may proceed with planned surgery without further cardiac testing.  -- I am advising her that as she has not had lipids checked for a while she should have lipids and thyroid function test checked once a year at least.  This can be done during her routine visit with primary physician annually.  -- She does not have to follow with cardiology unless she has any new symptoms or concerning signs.    Orders:  -     Ambulatory Referral to Cardiology  -     POCT ECG

## 2024-05-01 NOTE — PROGRESS NOTES
[facility]  [facility]  Primary Office: [address] [place] [address]  Phone: [phone]; Fax: [phone]  *-*-*-*-*-*-*-*-*-*-*-*-*-*-*-*-*-*-*-*-*-*-*-*-*-*-*-*-*-*-*-*-*-*-*-*-*-*-*-*-*-*-*-*-*-*-*-*-*-*-*-*-*-*  ENCOUNTER DATE: 05/01/24 11:05 AM  PATIENT NAME: Jazmine Tran   1980    [id number]  Age: 43 y.o.      Sex: female  ENCOUNTER PROVIDER:  Marcus John MD, A, [facility]  PRIMARYCARE PHYSICIAN: Alvin Zhou DO  REFERRING PHYSICIAN: Blanca Shah MD  [address] [place] [address] Blanca Evangelista MD   *-*-*-*-*-*-*-*-*-*-*-*-*-*-*-*-*-*-*-*-*-*-*-*-*-*-*-*-*-*-*-*-*-*-*-*-*-*-*-*-*-*-*-*-*-*-*-*-*-*-*-*-*-*-  REASON FOR REFERRAL: Preoperative cardiac risk assessment    *-*-*-*-*-*-*-*-*-*-*-*-*-*-*-*-*-*-*-*-*-*-*-*-*-*-*-*-*-*-*-*-*-*-*-*-*-*-*-*-*-*-*-*-*-*-*-*-*-*-*-*-*-*-  CARDIOLOGY ASSESSMENT & PLAN:   Diagnosis ICD-10-CM Associated Orders   1. Preop cardiovascular exam  Z01.810 Ambulatory Referral to Cardiology     POCT ECG        1. Preop cardiovascular exam  Assessment & Plan:  Ms. Jazmine Tran is being considered for elective hernia repair surgery under general  anesthesia, procedure with inherent intermediate cardiac risk.  She has no active cardiac conditions.  She has no major cardiac risk factors..  Her blood pressure is normal.  On physical examination there is no evidence of significant valvular heart disease or signs of heart failure.  Her renal function is normal.  Her exercise tolerance is good.  Her ECG is normal.    Her overall risk for major adverse cardiac events relating to plan procedure is low to moderate due to her age.    -- In the absence of symptoms and normal examination no further testing is needed.  -- She may proceed with planned surgery without further cardiac testing.  -- I am advising her that as she has not had lipids checked for a while she should have lipids and thyroid  function test checked once a year at least.  This can be done during her routine visit with primary physician annually.  -- She does not have to follow with cardiology unless she has any new symptoms or concerning signs.    Orders:  -     Ambulatory Referral to Cardiology  -     POCT ECG       *-*-*-*-*-*-*-*-*-*-*-*-*-*-*-*-*-*-*-*-*-*-*-*-*-*-*-*-*-*-*-*-*-*-*-*-*-*-*-*-*-*-*-*-*-*-*-*-*-*-*-*-*-*-  CURRENT ECG:  Results for orders placed or performed in visit on 05/01/24   POCT ECG    Narrative    Sinus rhythm, HR 66 bpm, normal axis and normal intervals, no significant chamber hypertrophy enlargement, no definitive evidence of prior infarction, no changes of ischemia.     *-*-*-*-*-*-*-*-*-*-*-*-*-*-*-*-*-*-*-*-*-*-*-*-*-*-*-*-*-*-*-*-*-*-*-*-*-*-*-*-*-*-*-*-*-*-*-*-*-*-*-*-*-*-  HISTORY OF PRESENT ILLNESS:  Patient is a pleasant 43-year-old female with medical history significant for:  1.  History of obesity, status post vertical sleeve gastrectomy and hiatal hernia repair 3/2/2021  2.  GERD  3.  Degenerative joint disease with osteoarthritis of hip status post bilateral hip replacements  4.  Asthma  5.  Migraine headaches    She has no previous history of coronary artery disease congestive heart failure diabetes hypertension or other comorbidities.  She has a history of sleeve gastrectomy and hiatal hernia repair in 2021 recently she has been experiencing increasing burping and heartburn symptoms.  She has now been diagnosed with recurrence of 3 cm hiatal hernia and is now being considered for surgical revision.  Cardiac risk assessment has been requested.    From a symptom perspective she has had no unusual chest pain or shortness of breath or dizziness or palpitations.  She has had no passing out or near passing out episodes.  She walks regularly and has not experienced decline in her exercise tolerance.  She denies any orthopnea or PND or worsening pedal edema.    Her family history is significant for  strong family history of hypertension in multiple family members.  Her paternal grandfather and paternal uncle had stroke.  Her mother has been diagnosed with prediabetes.  There is no family history of premature coronary artery disease or sudden cardiac death.    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  [facility]    She has up to 2 cups of coffee a day.  She drinks alcohol rarely.  She has no history of smoking or history of illicit drug use.    Functional capacity status: Excellent   (Excellent- >10 METs; Good: (7-10 METs); Moderate (4-7 METs); Poor (<= 4 METs)    Any chronic stressors: None   (feeling of poor health, financial problems, and social isolation etc).    Current cardiac meds: She is not on any cardiac specific medications.    Previous blood work:   Blood work 4/30/2024 sodium 136 potassium 4.0 chloride 100 bicarb 27 BUN 21 creatinine 0.83 GFR 86  Normal LFTs total protein borderline decreased at 6.2  Normal CBC  Normal iron studies in February 2024  Hemoglobin A1c 5.5 on 3/20/2022  TSH 1.090 10/24/2020  Lipid profile 10/24/2020 total cholesterol 197 triglyceride 127 HDL 91 calculated LDL 81    Previous cardiac studies: She has not had any cardiac specific studies in the past.    *-*-*-*-*-*-*-*-*-*-*-*-*-*-*-*-*-*-*-*-*-*-*-*-*-*-*-*-*-*-*-*-*-*-*-*-*-*-*-*-*-*-*-*-*-*-*-*-*-*-*-*-*-*  PAST MEDICAL HISTORY:  Past Medical History:   Diagnosis Date    Arthritis     Right hip    Asthma     allergy induced    Environmental allergies     dust    GERD (gastroesophageal reflux disease)     Migraine with aura     Osteoarthritis     PONV (postoperative nausea and vomiting)     Seasonal allergies     Varicella 4 or 5 years old    Wears contact lenses     wears at night    PAST SURGICAL HISTORY:   Past Surgical History:   Procedure Laterality Date    ANKLE SURGERY Left     ANKLE STABALIZATION    BARIATRIC SURGERY  3/2/21    Gastric sleeve    CHOLECYSTECTOMY  2015    COLONOSCOPY       JOINT REPLACEMENT  03/2013    Total left hip replacement    KS ARTHRP ACETBLR/PROX FEM PROSTC AGRFT/ALGRFT Right 04/27/2022    Procedure: ARTHROPLASTY HIP TOTAL ANTERIOR;  Surgeon: Cindi Vences MD;  Location: [facility];  Service: Orthopedics    KS LAPS GSTRC RSTRICTIV PX LONGITUDINAL GASTRECTOMY N/A 03/02/2021    Procedure: ROBOTIC SLEEVE GASTRECTOMY; ROBOTIC PARAESOPHAGEAL HERNIA REPAIR;  Surgeon: Blanca Shah MD;  Location: [facility];  Service: Bariatrics    TOOTH EXTRACTION      TOTAL HIP ARTHROPLASTY Left          FAMILY HISTORY:  Family History   Problem Relation Age of Onset    Arthritis Mother     Hypertension Mother     Skin cancer Mother     Cancer Father         Prostate    Diabetes Father         MELLITUS    Hypertension Father     Anemia Sister     Celiac disease Sister     Thyroid disease Sister     Varicose Veins Sister         LOWER EXTREMITIES    Breast cancer Sister 44    Cancer Sister         Breast cancer    Liver cancer Maternal Grandmother 80    Diabetes Maternal Grandmother     Cancer Maternal Grandmother         Liver    No Known Problems Maternal Grandfather     Diabetes Paternal Grandmother         MELLIYUS    Dementia Paternal Grandmother     Diabetes Paternal Grandfather         MELLITUS    Stroke Paternal Grandfather     Stroke Paternal Uncle     Heart disease Neg Hx     SOCIAL HISTORY:  Social History     Tobacco Use   Smoking Status Never   Smokeless Tobacco Never      Social History     Substance and Sexual Activity   Alcohol Use Not Currently    Comment: Socially     Social History     Substance and Sexual Activity   Drug Use No    [unfilled]     *-*-*-*-*-*-*-*-*-*-*-*-*-*-*-*-*-*-*-*-*-*-*-*-*-*-*-*-*-*-*-*-*-*-*-*-*-*-*-*-*-*-*-*-*-*-*-*-*-*-*-*-*-*  ALLERGIES:  No Known Allergies CURRENT SCHEDULED MEDICATIONS:    Current Outpatient Medications:     ascorbic acid (VITAMIN C) 500 MG tablet, Take 1 tablet (500 mg total) by mouth 2 (two) times a day, Disp: 60 tablet, Rfl: 0     CALCIUM PO, Take 2 tablets by mouth in the morning, Disp: , Rfl:     Cetirizine HCl (ZYRTEC PO), Take 10 mg by mouth daily , Disp: , Rfl:     fluocinolone (SYNALAR) 0.025 % ointment, daily at bedtime Around the mouth, Disp: , Rfl: 2    Misc Natural Products (Turmeric Curcumin) CAPS, Take by mouth in the morning, Disp: , Rfl:     Multiple Vitamins-Minerals (multivitamin with minerals) tablet, Take 1 tablet by mouth daily, Disp: 30 tablet, Rfl: 0    omeprazole (PriLOSEC) 20 mg delayed release capsule, TAKE 1 CAPSULE BY MOUTH EVERY DAY, Disp: 90 capsule, Rfl: 0     *-*-*-*-*-*-*-*-*-*-*-*-*-*-*-*-*-*-*-*-*-*-*-*-*-*-*-*-*-*-*-*-*-*-*-*-*-*-*-*-*-*-*-*-*-*-*-*-*-*-*-*-*-*  REVIEW OF SYMPTOMS:    Positive for: As noted above in HPI  Negative for: All remaining as reviewed below and in HPI. SYSTEM SYMPTOMS REVIEWED:  General--weight change, fever, night sweats  Respiratoryl-- Wheezing, shortness of breath, cough, URI symptoms, sputum, blood  Cardiovascular--chest pain, syncope, dyspnea on exertion, edema, decline in exercise tolerance, claudication   Gastrointestinal--persistent vomiting, diarrhea, abdominal distention, blood in stool   Muscular or skeletal--joint pain or swelling   Neurologic--headaches, syncope, abnormal movement  Hematologic--history of easy bruising and bleeding   Endocrine--thyroid enlargement, heat or cold intolerance, polyuria   Psychiatric--anxiety, depression      *-*-*-*-*-*-*-*-*-*-*-*-*-*-*-*-*-*-*-*-*-*-*-*-*-*-*-*-*-*-*-*-*-*-*-*-*-*-*-*-*-*-*-*-*-*-*-*-*-*-*-*-*-*  CURRENT OUTPATIENT MEDICATIONS:     Current Outpatient Medications:     ascorbic acid (VITAMIN C) 500 MG tablet, Take 1 tablet (500 mg total) by mouth 2 (two) times a day, Disp: 60 tablet, Rfl: 0    CALCIUM PO, Take 2 tablets by mouth in the morning, Disp: , Rfl:     Cetirizine HCl (ZYRTEC PO), Take 10 mg by mouth daily , Disp: , Rfl:     fluocinolone (SYNALAR) 0.025 % ointment, daily at bedtime Around the mouth, Disp: , Rfl: 2     Misc Natural Products (Turmeric Curcumin) CAPS, Take by mouth in the morning, Disp: , Rfl:     Multiple Vitamins-Minerals (multivitamin with minerals) tablet, Take 1 tablet by mouth daily, Disp: 30 tablet, Rfl: 0    omeprazole (PriLOSEC) 20 mg delayed release capsule, TAKE 1 CAPSULE BY MOUTH EVERY DAY, Disp: 90 capsule, Rfl: 0    *-*-*-*-*-*-*-*-*-*-*-*-*-*-*-*-*-*-*-*-*-*-*-*-*-*-*-*-*-*-*-*-*-*-*-*-*-*-*-*-*-*-*-*-*-*-*-*-*-*-*-*-*-*  VITAL SIGNS:  Vitals:    05/01/24 1025   BP: 114/78   BP Location: Left arm   Patient Position: Sitting   Cuff Size: Standard   Pulse: 66   Weight: 79.8 kg (176 lb)       BMI: Body mass index is 27.57 kg/m².    WEIGHTS:   Wt Readings from Last 25 Encounters:   05/01/24 79.8 kg (176 lb)   03/29/24 81.9 kg (180 lb 8 oz)   03/27/24 80.7 kg (178 lb)   03/05/24 80.7 kg (178 lb)   02/27/24 85.3 kg (188 lb)   11/30/23 85.4 kg (188 lb 3.2 oz)   10/06/23 88 kg (194 lb)   09/08/23 88.2 kg (194 lb 8 oz)   05/02/23 87.1 kg (192 lb)   03/07/23 83 kg (183 lb)   10/03/22 88 kg (194 lb)   09/28/22 88.4 kg (194 lb 12.8 oz)   09/09/22 87.8 kg (193 lb 8 oz)   07/12/22 88.3 kg (194 lb 9.6 oz)   05/04/22 86.4 kg (190 lb 6.4 oz)   04/06/22 86.4 kg (190 lb 6.4 oz)   03/22/22 85.6 kg (188 lb 12.8 oz)   03/09/22 83.7 kg (184 lb 8 oz)   02/24/22 84.4 kg (186 lb)   02/21/22 84.4 kg (186 lb)   02/08/22 83 kg (183 lb)   10/18/21 83 kg (183 lb)   09/20/21 83.5 kg (184 lb)   09/08/21 83.7 kg (184 lb 8 oz)   09/07/21 83.8 kg (184 lb 12.8 oz)        *-*-*-*-*-*-*-*-*-*-*-*-*-*-*-*-*-*-*-*-*-*-*-*-*-*-*-*-*-*-*-*-*-*-*-*-*-*-*-*-*-*-*-*-*-*-*-*-*-*-*-*-*-*-  PHYSICAL EXAM:  General Appearance:    Alert, cooperative, no distress, appears stated age tall, normal build   Head, Eyes, ENT:    No obvious abnormality, moist mucous mebranes.   Neck:   Supple, no carotid bruit. No JVD, no obvious lymphadenoapthy   Back:     Symmetric, no curvature.   Lungs:     Respirations unlabored. Clear to auscultation bilaterally,     Chest wall:    No tenderness or deformity   Heart:    Regular rate and rhythm, S1 and S2 normal, no murmur, rub  or gallop.   Abdomen:     Soft, non-tender, No obvious masses, or organomegaly, no abdominal bruit   Extremities:   Extremities warm, no cyanosis or edema    Skin:   No venostatic changes in lower extremities.   Normal skin color, texture, and turgor. No rashes or lesions   Neuro: Pt is alert and oriented time 3 with no gross motor deficits.   Psychiatric/Behavioral: Mood is normal. Behavior is normal.   *-*-*-*-*-*-*-*-*-*-*-*-*-*-*-*-*-*-*-*-*-*-*-*-*-*-*-*-*-*-*-*-*-*-*-*-*-*-*-*-*-*-*-*-*-*-*-*-*-*-*-*-*-*-  LABORATORY DATA: I have personally reviewed the available laboratory data.        Potassium   Date Value Ref Range Status   04/30/2024 4.0 3.5 - 5.3 mmol/L Final   02/24/2024 4.1 3.5 - 5.3 mmol/L Final   03/10/2023 3.8 3.5 - 5.3 mmol/L Final   02/01/2020 4.8 3.5 - 5.3 mmol/L Final     Chloride   Date Value Ref Range Status   04/30/2024 100 96 - 108 mmol/L Final   02/24/2024 107 96 - 108 mmol/L Final   03/10/2023 110 (H) 96 - 108 mmol/L Final   02/01/2020 107 98 - 110 mmol/L Final     CO2   Date Value Ref Range Status   04/30/2024 27 21 - 32 mmol/L Final   02/24/2024 28 21 - 32 mmol/L Final   03/10/2023 24 21 - 32 mmol/L Final   02/01/2020 25 20 - 32 mmol/L Final     BUN   Date Value Ref Range Status   04/30/2024 21 5 - 25 mg/dL Final   02/24/2024 20 5 - 25 mg/dL Final   03/10/2023 15 5 - 25 mg/dL Final   02/01/2020 12 7 - 25 mg/dL Final     Creatinine   Date Value Ref Range Status   04/30/2024 0.83 0.60 - 1.30 mg/dL Final     Comment:     Standardized to IDMS reference method   02/24/2024 0.89 0.60 - 1.30 mg/dL Final     Comment:     Standardized to IDMS reference method   03/10/2023 0.98 0.60 - 1.30 mg/dL Final     Comment:     Standardized to IDMS reference method     eGFR   Date Value Ref Range Status   04/30/2024 86 ml/min/1.73sq m Final   02/24/2024 79 ml/min/1.73sq m Final   03/10/2023 70  ml/min/1.73sq m Final     Calcium   Date Value Ref Range Status   04/30/2024 8.9 8.4 - 10.2 mg/dL Final   02/24/2024 9.1 8.4 - 10.2 mg/dL Final   03/10/2023 9.1 8.3 - 10.1 mg/dL Final   02/01/2020 9.2 8.6 - 10.2 mg/dL Final     AST   Date Value Ref Range Status   04/30/2024 24 13 - 39 U/L Final   02/24/2024 24 13 - 39 U/L Final   03/10/2023 16 5 - 45 U/L Final     Comment:     Specimen collection should occur prior to Sulfasalazine administration due to the potential for falsely depressed results.    02/01/2020 14 10 - 30 U/L Final     ALT   Date Value Ref Range Status   04/30/2024 27 7 - 52 U/L Final     Comment:     Specimen collection should occur prior to Sulfasalazine administration due to the potential for falsely depressed results.    02/24/2024 25 7 - 52 U/L Final     Comment:     Specimen collection should occur prior to Sulfasalazine administration due to the potential for falsely depressed results.    03/10/2023 20 12 - 78 U/L Final     Comment:     Specimen collection should occur prior to Sulfasalazine and/or Sulfapyridine administration due to the potential for falsely depressed results.    02/01/2020 12 6 - 29 U/L Final     Alkaline Phosphatase   Date Value Ref Range Status   04/30/2024 35 34 - 104 U/L Final   02/24/2024 37 34 - 104 U/L Final   03/10/2023 39 (L) 46 - 116 U/L Final   02/01/2020 46 33 - 115 U/L Final     Magnesium, Serum   Date Value Ref Range Status   02/01/2020 2.0 1.5 - 2.5 mg/dL Final     WBC   Date Value Ref Range Status   04/30/2024 6.49 4.31 - 10.16 Thousand/uL Final   02/24/2024 4.74 4.31 - 10.16 Thousand/uL Final   03/10/2023 5.08 4.31 - 10.16 Thousand/uL Final     Hemoglobin   Date Value Ref Range Status   04/30/2024 12.8 11.5 - 15.4 g/dL Final   02/24/2024 13.5 11.5 - 15.4 g/dL Final   03/10/2023 12.3 11.5 - 15.4 g/dL Final     Platelets   Date Value Ref Range Status   04/30/2024 220 149 - 390 Thousands/uL Final   02/24/2024 288 149 - 390 Thousands/uL Final   03/10/2023 298  "149 - 390 Thousands/uL Final     PTT   Date Value Ref Range Status   03/30/2022 28 23 - 37 seconds Final     Comment:     Therapeutic Heparin Range =  60-90 seconds     INR   Date Value Ref Range Status   03/30/2022 0.96 0.84 - 1.19 Final     No results found for: \"CK\", \"CKMB\", \"DIGOXIN\"  No results found for: \"TSH\"  HDL   Date Value Ref Range Status   02/01/2020 82 >50 mg/dL Final     HDL, Direct   Date Value Ref Range Status   10/24/2020 91 >=40 mg/dL Final     Comment:     HDL Cholesterol:       Low     <41 mg/dL  Specimen collection should occur prior to Metamizole administration due to the potential for falsley depressed results.     Triglycerides   Date Value Ref Range Status   10/24/2020 127 <=150 mg/dL Final     Comment:     Triglyceride:     Normal          <150 mg/dl     Borderline High 150-199 mg/dl     High            200-499 mg/dl        Very High       >499 mg/dl    Specimen collection should occur prior to N-Acetylcysteine or Metamizole administration due to the potential for falsely depressed results.   02/01/2020 119 <150 mg/dL Final      Hemoglobin A1C   Date Value Ref Range Status   03/30/2022 5.5 Normal 3.8-5.6%; PreDiabetic 5.7-6.4%; Diabetic >=6.5%; Glycemic control for adults with diabetes <7.0% % Final     Urine Culture   Date Value Ref Range Status   07/13/2023 (A)  Final    >100,000 cfu/ml - [facility] omnicolens Gram Negative Oliverio   07/13/2023 70,000-79,000 cfu/ml  Final     Comment:     Mixed Contaminants X3   10/15/2020 60,000-69,000 cfu/ml  Final     Comment:     Mixed Contaminants X5       *-*-*-*-*-*-*-*-*-*-*-*-*-*-*-*-*-*-*-*-*-*-*-*-*-*-*-*-*-*-*-*-*-*-*-*-*-*-*-*-*-*-*-*-*-*-*-*-*-*-*-*-*-*-  RADIOLOGY RESULTS:  EGD    Result Date: 3/27/2024  Impression: Irregular Z-line Medium type I hiatal hernia Moderate abnormal mucosa in the esophagus Previous sleeve gastrectomy Performed forceps biopsy in the antrum to rule out H. pylori The stomach and 2nd part of the duodenum appeared " normal. Abnormal mucosa with erosion in the body of the stomach RECOMMENDATION:  There is no recommended follow-up for this procedure.   No Staff Documented       *-*-*-*-*-*-*-*-*-*-*-*-*-*-*-*-*-*-*-*-*-*-*-*-*-*-*-*-*-*-*-*-*-*-*-*-*-*-*-*-*-*-*-*-*-*-*-*-*-*-*-*-*-*-  LAST ECHOCARDIOGRAM AND OTHER CARDIOLOGY RESULTS:  No results found for this or any previous visit.    No results found for this or any previous visit.    No results found for this or any previous visit.    No results found for this or any previous visit.       *-*-*-*-*-*-*-*-*-*-*-*-*-*-*-*-*-*-*-*-*-*-*-*-*-*-*-*-*-*-*-*-*-*-*-*-*-*-*-*-*-*-*-*-*-*-*-*-*-*-*-*-*-*-  SIGNATURES:   @SIG@   Marcus John MD     CC:   DO Dakota Hodges Maher, MD

## 2024-05-01 NOTE — ASSESSMENT & PLAN NOTE
Ms. Jazmine Tran is being considered for elective hernia repair surgery under general  anesthesia, procedure with inherent intermediate cardiac risk.  She has no active cardiac conditions.  She has no major cardiac risk factors..  Her blood pressure is normal.  On physical examination there is no evidence of significant valvular heart disease or signs of heart failure.  Her renal function is normal.  Her exercise tolerance is good.  Her ECG is normal.    Her overall risk for major adverse cardiac events relating to plan procedure is low to moderate due to her age.    -- In the absence of symptoms and normal examination no further testing is needed.  -- She may proceed with planned surgery without further cardiac testing.  -- I am advising her that as she has not had lipids checked for a while she should have lipids and thyroid function test checked once a year at least.  This can be done during her routine visit with primary physician annually.  -- She does not have to follow with cardiology unless she has any new symptoms or concerning signs.

## 2024-05-07 ENCOUNTER — OFFICE VISIT (OUTPATIENT)
Dept: OBGYN CLINIC | Facility: CLINIC | Age: 44
End: 2024-05-07
Payer: COMMERCIAL

## 2024-05-07 ENCOUNTER — HOSPITAL ENCOUNTER (OUTPATIENT)
Dept: RADIOLOGY | Facility: HOSPITAL | Age: 44
Discharge: HOME | End: 2024-05-07
Attending: ORTHOPAEDIC SURGERY
Payer: COMMERCIAL

## 2024-05-07 VITALS
HEIGHT: 67 IN | SYSTOLIC BLOOD PRESSURE: 108 MMHG | DIASTOLIC BLOOD PRESSURE: 73 MMHG | WEIGHT: 177 LBS | BODY MASS INDEX: 27.78 KG/M2 | HEART RATE: 65 BPM

## 2024-05-07 DIAGNOSIS — K21.9 ACID REFLUX: ICD-10-CM

## 2024-05-07 DIAGNOSIS — E66.9 OBESITY, CLASS I, BMI 30-34.9: ICD-10-CM

## 2024-05-07 DIAGNOSIS — Z96.641 S/P HIP REPLACEMENT, RIGHT: Primary | ICD-10-CM

## 2024-05-07 DIAGNOSIS — M79.605 LUMBAR PAIN WITH RADIATION DOWN LEFT LEG: ICD-10-CM

## 2024-05-07 DIAGNOSIS — S76.312A HAMSTRING MUSCLE STRAIN, LEFT, INITIAL ENCOUNTER: ICD-10-CM

## 2024-05-07 DIAGNOSIS — Z96.641 S/P HIP REPLACEMENT, RIGHT: ICD-10-CM

## 2024-05-07 DIAGNOSIS — Z98.84 BARIATRIC SURGERY STATUS: ICD-10-CM

## 2024-05-07 DIAGNOSIS — M54.50 LUMBAR PAIN WITH RADIATION DOWN LEFT LEG: ICD-10-CM

## 2024-05-07 PROCEDURE — 73502 X-RAY EXAM HIP UNI 2-3 VIEWS: CPT

## 2024-05-07 PROCEDURE — 99213 OFFICE O/P EST LOW 20 MIN: CPT | Performed by: ORTHOPAEDIC SURGERY

## 2024-05-07 NOTE — LETTER
May 7, 2024     Patient: Jzamine Tran  YOB: 1980  Date of Visit: 5/7/2024      To Whom it May Concern:    Jazmine Tran is under my professional care. Jazmine was seen in my office on 5/7/2024.     If you have any questions or concerns, please don't hesitate to call.         Sincerely,          Cindi Vences MD        CC: No Recipients

## 2024-05-07 NOTE — PROGRESS NOTES
Assessment:   Diagnosis ICD-10-CM Associated Orders   1. S/P hip replacement, right  Z96.641 XR hip/pelv 2-3 vws right if performed      2. Hamstring muscle strain, left, initial encounter  S76.312A Ambulatory referral to Spine & Pain Management      3. Lumbar pain with radiation down left leg  M54.50 Ambulatory referral to Spine & Pain Management    M79.605           Plan:  New x-rays of the right hip/pelvis were obtained today and reviewed with the patient today and the prosthesis is in good anatomic position with no signs of loosening.  On exam patient has full nonpainful range of motion with 5 out of 5 strength in all planes.  Patient is struggling with a left proximal hamstring strain.  Recommend the patient to see pain and spine to further evaluate for lumbar pathology   Patient should continue her home exercise program.  Reminder to take antibiotics prior to dental cleanings or procedure.   We will follow up as needed.     To do next visit:  Return if symptoms worsen or fail to improve.    The above stated was discussed in layman's terms and the patient expressed understanding.  All questions were answered to the patient's satisfaction.       Scribe Attestation      I,:  Kajal Norwood am acting as a scribe while in the presence of the attending physician.:       I,:  Cindi Vences MD personally performed the services described in this documentation    as scribed in my presence.:               Subjective:   Jazmine Tran is a 43 y.o. female who presents today for a follow-up for her right hip.  Patient is 2 years status post right anterior total hip arthroplasty performed on 4/27/2022.  Patient has been handling a left proximal hamstring tendon treating with Dr. Hillman.  She reports that in 2016, she hit a concrete parking barrier with her foot while walking and felt a pop from the top of her hamstring to the knee. She denies numbness/tingling.       Review of systems negative unless otherwise  specified in HPI  Review of Systems   Constitutional:  Negative for chills, fever and unexpected weight change.   HENT:  Negative for hearing loss, nosebleeds and sore throat.    Eyes:  Negative for pain, redness and visual disturbance.   Respiratory:  Negative for cough, shortness of breath and wheezing.    Cardiovascular:  Negative for chest pain, palpitations and leg swelling.   Gastrointestinal:  Negative for abdominal pain and nausea.   Genitourinary:  Negative for dyspareunia, dysuria and frequency.   Skin:  Negative for rash and wound.   Neurological:  Negative for dizziness, numbness and headaches.   Psychiatric/Behavioral:  Negative for decreased concentration and suicidal ideas. The patient is not nervous/anxious.        Past Medical History:   Diagnosis Date    Arthritis     Right hip    Asthma     allergy induced    Environmental allergies     dust    GERD (gastroesophageal reflux disease)     Migraine with aura     Osteoarthritis     PONV (postoperative nausea and vomiting)     Seasonal allergies     Varicella 4 or 5 years old    Wears contact lenses     wears at night       Past Surgical History:   Procedure Laterality Date    ANKLE SURGERY Left     ANKLE STABALIZATION    BARIATRIC SURGERY  3/2/21    Gastric sleeve    CHOLECYSTECTOMY  2015    COLONOSCOPY      JOINT REPLACEMENT  03/2013    Total left hip replacement    [place] ARTHRP ACETBLR/PROX FEM PROSTC AGRFT/ALGRFT Right 04/27/2022    Procedure: ARTHROPLASTY HIP TOTAL ANTERIOR;  Surgeon: Cindi Vences MD;  [facility];  Service: Orthopedics    [place] LAPS GSTRC RSTRICTIV PX LONGITUDINAL GASTRECTOMY N/A 03/02/2021    Procedure: ROBOTIC SLEEVE GASTRECTOMY; ROBOTIC PARAESOPHAGEAL HERNIA REPAIR;  Surgeon: Blanca Shah MD;  Location: [facility];  Service: Bariatrics    TOOTH EXTRACTION      TOTAL HIP ARTHROPLASTY Left        Family History   Problem Relation Age of Onset    Arthritis Mother     Hypertension Mother     Skin cancer Mother      Cancer Father         Prostate    Diabetes Father         MELLITUS    Hypertension Father     Anemia Sister     Celiac disease Sister     Thyroid disease Sister     Varicose Veins Sister         LOWER EXTREMITIES    Breast cancer Sister 44    Cancer Sister         Breast cancer    Liver cancer Maternal Grandmother 80    Diabetes Maternal Grandmother     Cancer Maternal Grandmother         Liver    No Known Problems Maternal Grandfather     Diabetes Paternal Grandmother         MELLIYUS    Dementia Paternal Grandmother     Diabetes Paternal Grandfather         MELLITUS    Stroke Paternal Grandfather     Stroke Paternal Uncle     Heart disease Neg Hx        Social History     Occupational History     Employer: [employer]   Tobacco Use    Smoking status: Never    Smokeless tobacco: Never   Vaping Use    Vaping status: Never Used   Substance and Sexual Activity    Alcohol use: Not Currently     Comment: Socially    Drug use: No    Sexual activity: Not Currently     Partners: Male     Birth control/protection: Abstinence, Ring         Current Outpatient Medications:     ascorbic acid (VITAMIN C) 500 MG tablet, Take 1 tablet (500 mg total) by mouth 2 (two) times a day, Disp: 60 tablet, Rfl: 0    CALCIUM PO, Take 2 tablets by mouth in the morning, Disp: , Rfl:     Cetirizine HCl (ZYRTEC PO), Take 10 mg by mouth daily , Disp: , Rfl:     fluocinolone (SYNALAR) 0.025 % ointment, daily at bedtime Around the mouth, Disp: , Rfl: 2    Misc Natural Products (Turmeric Curcumin) CAPS, Take by mouth in the morning, Disp: , Rfl:     Multiple Vitamins-Minerals (multivitamin with minerals) tablet, Take 1 tablet by mouth daily, Disp: 30 tablet, Rfl: 0    omeprazole (PriLOSEC) 20 mg delayed release capsule, TAKE 1 CAPSULE BY MOUTH EVERY DAY, Disp: 90 capsule, Rfl: 0    No Known Allergies         Vitals:    05/07/24 0739   BP: 108/73   Pulse: 65       Body mass index is 27.72 kg/m².  Wt Readings from Last 3 Encounters:   05/07/24  "80.3 kg (177 lb)   05/01/24 79.8 kg (176 lb)   03/29/24 81.9 kg (180 lb 8 oz)       Objective:                    Right Hip Exam     Tenderness   The patient is experiencing no tenderness.     Range of Motion   Flexion:  90   External rotation:  40   Internal rotation:  20     Muscle Strength   Abduction: 5/5   Adduction: 5/5   Flexion: 5/5     Other   Erythema: absent  Sensation: normal  Pulse: present    Comments:  Calf is soft and non tender            Diagnostics, reviewed and taken today if performed as documented:    The attending physician has personally reviewed the pertinent films in PACS and interpretation is as follows:  Xrays of the right hip/pelvis: Prothesis is in good anatomic position with no signs of loosening.     Procedures, if performed today:    Procedures    None performed      Portions of the record may have been created with voice recognition software.  Occasional wrong word or \"sound a like\" substitutions may have occurred due to the inherent limitations of voice recognition software.  Read the chart carefully and recognize, using context, where substitutions have occurred.  "

## 2024-05-07 NOTE — PATIENT INSTRUCTIONS
[facility]                                                                                               Dr. Cindi Vences                                                                                                            ANTIBIOTIC USE FOR JOINT REPLACEMENT PATIENTS  You have had surgery to replace one of your joints with a metal prosthesis. Going forward, you should take oral antibiotics before any dental work, including routine cleanings. These procedures are a potential source of injection. If you develop an infection, it could spread to your operative joint and cause complications.  Please show the following guidelines to your medical doctor and dentist, so he/she can prescribe the appropriate medications.  The following information is recommended by the American Heart, Dental, and Orthopedic Associations:  STANDARD GENERAL PROPHYLAXIS  antibiotic prophylaxis recommended lifetime  Recommend refraining from dental procedures until 3 months post operatively  Amoxicillin for Adults:    500mg - Take 4 capsules (2 grams) orally one hour before the procedure  If allergic to Penicillin  Clindamycin for Adults:   300mg - Take 2 capsules (600 mg) orally one hour before the procedure  Azithromycin for Adults:  250mg - Take 2 capsules (500 mg) orally one hour before the procedure  Cephalexin for Adults:     500mg - Take 4 capsules (2 grams) orally one hour before the procedure  Note: Cephalosporins should not be used if you have ever developed an immediate hypersensitivity reaction (i.e., hives, swelling, severe itching, difficulty breathing) to Penicillin  Please feel free to contact our office at [phone] with questions or concerns.

## 2024-05-08 LAB
ALBUMIN SERPL BCP-MCNC: 4 G/DL (ref 3.5–5)
ALP SERPL-CCNC: 35 U/L (ref 34–104)
ALT SERPL W P-5'-P-CCNC: 27 U/L (ref 7–52)
ANION GAP SERPL CALCULATED.3IONS-SCNC: 9 MMOL/L (ref 4–13)
AST SERPL W P-5'-P-CCNC: 24 U/L (ref 13–39)
BILIRUB SERPL-MCNC: 0.38 MG/DL (ref 0.2–1)
BUN SERPL-MCNC: 21 MG/DL (ref 5–25)
CALCIUM SERPL-MCNC: 8.9 MG/DL (ref 8.4–10.2)
CHLORIDE SERPL-SCNC: 100 MMOL/L (ref 96–108)
CO2 SERPL-SCNC: 27 MMOL/L (ref 21–32)
CREAT SERPL-MCNC: 0.83 MG/DL (ref 0.6–1.3)
GFR SERPL CREATININE-BSD FRML MDRD: 86 ML/MIN/1.73SQ M
GLUCOSE SERPL-MCNC: 88 MG/DL (ref 65–140)
POTASSIUM SERPL-SCNC: 4 MMOL/L (ref 3.5–5.3)
PROT SERPL-MCNC: 6.2 G/DL (ref 6.4–8.4)
SODIUM SERPL-SCNC: 136 MMOL/L (ref 135–147)

## 2024-05-08 RX ORDER — OMEPRAZOLE 20 MG/1
CAPSULE, DELAYED RELEASE ORAL
Qty: 90 CAPSULE | Refills: 1 | Status: SHIPPED | OUTPATIENT
Start: 2024-05-08

## 2024-05-09 ENCOUNTER — PREP FOR PROCEDURE (OUTPATIENT)
Dept: BARIATRICS | Facility: CLINIC | Age: 44
End: 2024-05-09

## 2024-05-09 DIAGNOSIS — K44.9 HIATAL HERNIA: ICD-10-CM

## 2024-05-09 DIAGNOSIS — K21.9 ESOPHAGEAL REFLUX: Primary | ICD-10-CM

## 2024-05-09 DIAGNOSIS — R14.2 BELCHING: ICD-10-CM

## 2024-05-09 DIAGNOSIS — R12 HEARTBURN: ICD-10-CM

## 2024-05-09 DIAGNOSIS — R14.0 BLOATING: ICD-10-CM

## 2024-06-03 ENCOUNTER — PATIENT MESSAGE (OUTPATIENT)
Dept: OBGYN CLINIC | Facility: CLINIC | Age: 44
End: 2024-06-03

## 2024-06-03 DIAGNOSIS — Z30.41 ENCOUNTER FOR SURVEILLANCE OF CONTRACEPTIVE PILLS: Primary | ICD-10-CM

## 2024-06-03 RX ORDER — ACETAMINOPHEN AND CODEINE PHOSPHATE 120; 12 MG/5ML; MG/5ML
1 SOLUTION ORAL DAILY
Qty: 90 TABLET | Refills: 2 | Status: SHIPPED | OUTPATIENT
Start: 2024-06-03

## 2024-06-06 ENCOUNTER — ANESTHESIA EVENT (OUTPATIENT)
Dept: PERIOP | Facility: HOSPITAL | Age: 44
End: 2024-06-06
Payer: COMMERCIAL

## 2024-06-10 ENCOUNTER — CONSULT (OUTPATIENT)
Dept: PAIN MEDICINE | Facility: MEDICAL CENTER | Age: 44
End: 2024-06-10
Payer: COMMERCIAL

## 2024-06-10 VITALS
HEIGHT: 67 IN | DIASTOLIC BLOOD PRESSURE: 70 MMHG | WEIGHT: 182 LBS | OXYGEN SATURATION: 98 % | BODY MASS INDEX: 28.56 KG/M2 | HEART RATE: 76 BPM | SYSTOLIC BLOOD PRESSURE: 124 MMHG

## 2024-06-10 DIAGNOSIS — S76.312A HAMSTRING MUSCLE STRAIN, LEFT, INITIAL ENCOUNTER: ICD-10-CM

## 2024-06-10 PROCEDURE — 99204 OFFICE O/P NEW MOD 45 MIN: CPT | Performed by: PHYSICAL MEDICINE & REHABILITATION

## 2024-06-10 NOTE — PROGRESS NOTES
Assessment  1. Hamstring muscle strain, left, initial encounter        Plan  Currently the patient is not experiencing any symptomatology consistent with lumbar radiculopathy.  No back pain and no radicular features past the knee.  She does have some pain and tenderness over the left hamstring tendon origin and pain is reproduced with active contraction of the muscle with palpation over the origin.  I recommend she continue to follow with Dr. Hillman regarding his recommendations and consideration for PRP injection.  I reviewed with the patient that I would not recommend a steroid injection into the region as this may worsen the integrity of the tendon.  Patient is welcome to follow-up with me on an as-needed basis.    My impressions and treatment recommendations were discussed in detail with the patient who verbalized understanding and had no further questions.  Discharge instructions were provided. I personally saw and examined the patient and I agree with the above discussed plan of care.    No orders of the defined types were placed in this encounter.    No orders of the defined types were placed in this encounter.      History of Present Illness    Jazmine Tran is a 43 y.o. female seen in consultation at the request of Dr. Vences for evaluation of her posterior thigh pain to rule out lumbar origin.  She has been experiencing pain in the left hamstring region chronically since an injury that occurred in 2015 when stepping over a parking curb.    She did have an MRI demonstrating tear of the hamstring tendon origin.    Currently describing moderate to severe intensity pain rated as a 6/10 which is nearly constant without any typical pattern characterized as pressure-like dull and aching.  This is localized to the hamstring origin region and posterior thigh and does not past the knee on the left.    Aggravating factors include bending sitting walking.  Alleviating factors include lying down and  relaxation.    She has tried heat or ice application which provided some moderate relief.    Social history negative for tobacco marijuana use positive for social alcohol consumption.    Not currently using any prescription pain medications.    She has previously visited with Dr. Hillman regarding this and he did recommend consideration of PRP injection into the tendon.  I did review this option with the patient.  I do not however perform these injections.    I have personally reviewed and/or updated the patient's past medical history, past surgical history, family history, social history, current medications, allergies, and vital signs today.     Review of Systems   Constitutional:  Negative for fever and unexpected weight change.   HENT:  Negative for trouble swallowing.    Eyes:  Negative for visual disturbance.   Respiratory:  Negative for shortness of breath and wheezing.    Cardiovascular:  Negative for chest pain and palpitations.   Gastrointestinal:  Negative for constipation, diarrhea, nausea and vomiting.   Endocrine: Negative for cold intolerance, heat intolerance and polydipsia.   Genitourinary:  Negative for difficulty urinating and frequency.   Musculoskeletal:  Positive for myalgias. Negative for arthralgias, gait problem and joint swelling.   Skin:  Negative for rash.   Neurological:  Negative for dizziness, seizures, syncope, weakness and headaches.   Hematological:  Does not bruise/bleed easily.   Psychiatric/Behavioral:  Negative for dysphoric mood.    All other systems reviewed and are negative.      Patient Active Problem List   Diagnosis    Primary osteoarthritis of right hip    Migraine with aura    Preop cardiovascular exam    Asthma    GERD (gastroesophageal reflux disease)    H/O gastric sleeve    PONV (postoperative nausea and vomiting)    Status post total hip replacement, right       Past Medical History:   Diagnosis Date    Arthritis     Right hip    Asthma     allergy induced     Environmental allergies     dust    GERD (gastroesophageal reflux disease)     Migraine with aura     Osteoarthritis     PONV (postoperative nausea and vomiting)     Seasonal allergies     Varicella 4 or 5 years old    Wears contact lenses     wears at night       Past Surgical History:   Procedure Laterality Date    ANKLE SURGERY Left     ANKLE STABALIZATION    BARIATRIC SURGERY  3/2/21    Gastric sleeve    CHOLECYSTECTOMY  2015    COLONOSCOPY      JOINT REPLACEMENT  03/2013    Total left hip replacement    [place] ARTHRP ACETBLR/PROX FEM PROSTC AGRFT/ALGRFT Right 04/27/2022    Procedure: ARTHROPLASTY HIP TOTAL ANTERIOR;  Surgeon: Cindi Vences MD;  [facility];  Service: Orthopedics    [place] LAPS [facility] RSTRICTIV PX LONGITUDINAL GASTRECTOMY N/A 03/02/2021    Procedure: ROBOTIC SLEEVE GASTRECTOMY; ROBOTIC PARAESOPHAGEAL HERNIA REPAIR;  Surgeon: Blanca Shah MD;  [facility];  Service: Bariatrics    TOOTH EXTRACTION      TOTAL HIP ARTHROPLASTY Left        Family History   Problem Relation Age of Onset    Arthritis Mother     Hypertension Mother     Skin cancer Mother     Cancer Father         Prostate    Diabetes Father         MELLITUS    Hypertension Father     Anemia Sister     Celiac disease Sister     Thyroid disease Sister     Varicose Veins Sister         LOWER EXTREMITIES    Breast cancer Sister 44    Cancer Sister         Breast cancer    Liver cancer Maternal Grandmother 80    Diabetes Maternal Grandmother     Cancer Maternal Grandmother         Liver    No Known Problems Maternal Grandfather     Diabetes Paternal Grandmother         MELLIYUS    Dementia Paternal Grandmother     Diabetes Paternal Grandfather         MELLITUS    Stroke Paternal Grandfather     Stroke Paternal Uncle     Heart disease Neg Hx        Social History     Occupational History     Employer: [employer]   Tobacco Use    Smoking status: Never    Smokeless tobacco: Never   Vaping Use    Vaping status: Never Used  "  Substance and Sexual Activity    Alcohol use: Not Currently     Comment: Socially    Drug use: No    Sexual activity: Not Currently     Partners: Male     Birth control/protection: Abstinence       Current Outpatient Medications on File Prior to Visit   Medication Sig    ascorbic acid (VITAMIN C) 500 MG tablet Take 1 tablet (500 mg total) by mouth 2 (two) times a day    CALCIUM PO Take 2 tablets by mouth in the morning    Cetirizine HCl (ZYRTEC PO) Take 10 mg by mouth daily     fluocinolone (SYNALAR) 0.025 % ointment daily at bedtime Around the mouth    Misc Natural Products (Turmeric Curcumin) CAPS Take by mouth in the morning    Multiple Vitamins-Minerals (multivitamin with minerals) tablet Take 1 tablet by mouth daily    norethindrone (Dee) 0.35 MG tablet Take 1 tablet (0.35 mg total) by mouth daily    omeprazole (PriLOSEC) 20 mg delayed release capsule TAKE 1 CAPSULE BY MOUTH EVERY DAY     No current facility-administered medications on file prior to visit.       No Known Allergies    Physical Exam    /70   Pulse 76   Ht 5' 7\" (1.702 m)   Wt 82.6 kg (182 lb)   SpO2 98%   BMI 28.51 kg/m²     Constitutional: normal, well developed, well nourished, alert, in no distress and non-toxic and no overt pain behavior.  Eyes: anicteric  HEENT: grossly intact  Neck: supple, symmetric, trachea midline and no masses   Pulmonary:even and unlabored  Cardiovascular:No edema or pitting edema present    Psychiatric:Mood and affect appropriate  Neurologic:Cranial Nerves II-XII grossly intact, bilateral lower extremity muscle stretch reflexes are physiologic and symmetric at the knees and ankles, bilateral lower extremity strength is normal, negative straight leg raise test from a seated position on the left  Musculoskeletal:normal, except for tenderness to palpation over the proximal hamstring origin, she also has pain with active knee flexion in this region while prone and placing pressure over the hamstring origin " on the left, lumbar spine flexibility is essentially full and does not reproduce any pain    Imaging

## 2024-06-13 ENCOUNTER — APPOINTMENT (OUTPATIENT)
Dept: LAB | Facility: CLINIC | Age: 44
End: 2024-06-13

## 2024-06-13 DIAGNOSIS — Z00.6 ENCOUNTER FOR EXAMINATION FOR NORMAL COMPARISON OR CONTROL IN CLINICAL RESEARCH PROGRAM: ICD-10-CM

## 2024-06-13 PROCEDURE — 36415 COLL VENOUS BLD VENIPUNCTURE: CPT

## 2024-06-13 NOTE — PRE-PROCEDURE INSTRUCTIONS
Pre-Surgery Instructions:   Medication Instructions    ascorbic acid (VITAMIN C) 500 MG tablet Stop taking 7 days prior to surgery.    CALCIUM PO Stop taking 7 days prior to surgery.    Cetirizine HCl (ZYRTEC PO) Hold day of surgery.    fluocinolone (SYNALAR) 0.025 % ointment Hold day of surgery.    Multiple Vitamins-Minerals (multivitamin with minerals) tablet Stop taking 7 days prior to surgery.    norethindrone (Dee) 0.35 MG tablet Take day of surgery.    omeprazole (PriLOSEC) 20 mg delayed release capsule Take day of surgery.    Medication instructions for day surgery reviewed. Please use only a sip of water to take your instructed medications. Avoid all over the counter vitamins, supplements and NSAIDS for one week prior to surgery per anesthesia guidelines. Tylenol is ok to take as needed.     You will receive a call one business day prior to surgery with an arrival time and hospital directions. If your surgery is scheduled on a Monday, the hospital will be calling you on the Friday prior to your surgery. If you have not heard from anyone by 8pm, please call the hospital supervisor through the hospital  at [phone]. ([place] [phone] or [place] [phone]).    Do not eat or drink anything after midnight the night before your surgery, including candy, mints, lifesavers, or chewing gum. Do not drink alcohol 24hrs before your surgery. Try not to smoke at least 24hrs before your surgery.       Follow the pre surgery showering instructions as listed in the “My Surgical Experience Booklet” or otherwise provided by your surgeon's office. Do not use a blade to shave the surgical area 1 week before surgery. It is okay to use a clean electric clippers up to 24 hours before surgery. Do not apply any lotions, creams, including makeup, cologne, deodorant, or perfumes after showering on the day of your surgery. Do not use dry shampoo, hair spray, hair gel, or any type of hair products.     No contact  lenses, eye make-up, or artificial eyelashes. Remove nail polish, including gel polish, and any artificial, gel, or acrylic nails if possible. Remove all jewelry including rings and body piercing jewelry.     Wear causal clothing that is easy to take on and off. Consider your type of surgery.    Keep any valuables, jewelry, piercings at home. Please bring any specially ordered equipment (sling, braces) if indicated.    Arrange for a responsible person to drive you to and from the hospital on the day of your surgery. Please confirm the visitor policy for the day of your procedure when you receive your phone call with an arrival time.     Call the surgeon's office with any new illnesses, exposures, or additional questions prior to surgery.    Please reference your “My Surgical Experience Booklet” for additional information to prepare for your upcoming surgery.

## 2024-06-25 LAB
APOB+LDLR+PCSK9 GENE MUT ANL BLD/T: NOT DETECTED
BRCA1+BRCA2 DEL+DUP + FULL MUT ANL BLD/T: NOT DETECTED
MLH1+MSH2+MSH6+PMS2 GN DEL+DUP+FUL M: NOT DETECTED

## 2024-06-27 ENCOUNTER — OFFICE VISIT (OUTPATIENT)
Dept: BARIATRICS | Facility: CLINIC | Age: 44
End: 2024-06-27
Payer: COMMERCIAL

## 2024-06-27 ENCOUNTER — TELEPHONE (OUTPATIENT)
Age: 44
End: 2024-06-27

## 2024-06-27 VITALS
HEART RATE: 87 BPM | DIASTOLIC BLOOD PRESSURE: 60 MMHG | OXYGEN SATURATION: 99 % | HEIGHT: 67 IN | WEIGHT: 182 LBS | SYSTOLIC BLOOD PRESSURE: 110 MMHG | BODY MASS INDEX: 28.56 KG/M2 | TEMPERATURE: 98.3 F

## 2024-06-27 DIAGNOSIS — Z98.84 BARIATRIC SURGERY STATUS: Primary | ICD-10-CM

## 2024-06-27 DIAGNOSIS — Z90.3 H/O GASTRIC SLEEVE: ICD-10-CM

## 2024-06-27 DIAGNOSIS — K44.9 HIATAL HERNIA: ICD-10-CM

## 2024-06-27 PROCEDURE — 99213 OFFICE O/P EST LOW 20 MIN: CPT | Performed by: SURGERY

## 2024-06-27 RX ORDER — ACETAMINOPHEN 10 MG/ML
1000 INJECTION, SOLUTION INTRAVENOUS ONCE
OUTPATIENT
Start: 2024-07-08 | End: 2024-06-27

## 2024-06-27 RX ORDER — CEFAZOLIN SODIUM 2 G/50ML
2000 SOLUTION INTRAVENOUS ONCE
OUTPATIENT
Start: 2024-07-08 | End: 2024-06-27

## 2024-06-27 RX ORDER — CELECOXIB 200 MG/1
200 CAPSULE ORAL ONCE
OUTPATIENT
Start: 2024-07-08 | End: 2024-06-27

## 2024-06-27 RX ORDER — HEPARIN SODIUM 5000 [USP'U]/ML
5000 INJECTION, SOLUTION INTRAVENOUS; SUBCUTANEOUS ONCE
OUTPATIENT
Start: 2024-07-08 | End: 2024-06-27

## 2024-06-27 NOTE — TELEPHONE ENCOUNTER
Patient called in to request a work note for today's appt with Dr Dakota Shah.  Please send it to her MyChart, thanks.

## 2024-06-27 NOTE — PROGRESS NOTES
BARIATRIC HISTORY AND PHYSICAL - BARIATRIC SURGERY  Jazmine Tran 43 y.o. female MRN: [id number]  Unit/Bed#:  Encounter: [id number]      HPI:  Jazmine Tran is a 43 y.o. female who presents to review their preoperative workup and see if they are a good candidate to undergo a foregut procedure.     Hx of sleeve and prior PEH repair    Review of Systems   Constitutional:  Negative for chills and fever.   HENT:  Negative for ear pain and sore throat.    Eyes:  Negative for pain and visual disturbance.   Respiratory:  Negative for cough and shortness of breath.    Cardiovascular:  Negative for chest pain and palpitations.   Gastrointestinal:  Negative for abdominal pain and vomiting.   Genitourinary:  Negative for dysuria and hematuria.   Musculoskeletal:  Negative for arthralgias and back pain.   Skin:  Negative for color change and rash.   Neurological:  Negative for seizures and syncope.   All other systems reviewed and are negative.      Historical Information   Past Medical History:   Diagnosis Date    Arthritis     Right hip    Asthma     allergy induced    Environmental allergies     dust    GERD (gastroesophageal reflux disease)     Migraine with aura     Osteoarthritis     PONV (postoperative nausea and vomiting)     Seasonal allergies     Varicella 4 or 5 years old    Wears contact lenses     wears at night     Past Surgical History:   Procedure Laterality Date    ANKLE SURGERY Left     ANKLE STABALIZATION    BARIATRIC SURGERY  3/2/21    Gastric sleeve    CHOLECYSTECTOMY  2015    COLONOSCOPY      JOINT REPLACEMENT Bilateral 03/2013    Total left hip replacement    ID ARTHRP ACETBLR/PROX FEM PROSTC AGRFT/ALGRFT Right 04/27/2022    Procedure: ARTHROPLASTY HIP TOTAL ANTERIOR;  Surgeon: Cindi Vences MD;  [facility];  Service: Orthopedics    ID LAPS [facility] RSTRICTIV PX LONGITUDINAL GASTRECTOMY N/A 03/02/2021    Procedure: ROBOTIC SLEEVE GASTRECTOMY; ROBOTIC PARAESOPHAGEAL HERNIA REPAIR;   "Surgeon: Blanca Shah MD;  Location: [facility] OR;  Service: Bariatrics    TOOTH EXTRACTION      TOTAL HIP ARTHROPLASTY Left      Social History   Social History     Substance and Sexual Activity   Alcohol Use Yes    Comment: Socially     Social History     Substance and Sexual Activity   Drug Use No     Social History     Tobacco Use   Smoking Status Never   Smokeless Tobacco Never     Family History: non-contributory    Meds/Allergies   all medications and allergies reviewed  No Known Allergies    Objective     Current Vitals:   Blood Pressure: 110/60 (06/27/24 1021)  Pulse: 87 (06/27/24 1021)  Temperature: 98.3 °F (36.8 °C) (06/27/24 1021)  Temp Source: Tympanic (06/27/24 1021)  Height: 5' 7\" (170.2 cm) (06/27/24 1021)  Weight - Scale: 82.6 kg (182 lb) (06/27/24 1021)  SpO2: 99 % (06/27/24 1021)  bowel movement  [unfilled]    Invasive Devices       None                   Physical Exam  Constitutional:       Appearance: Normal appearance. She is obese.   HENT:      Head: Normocephalic and atraumatic.      Nose: Nose normal.      Mouth/Throat:      Mouth: Mucous membranes are moist.   Eyes:      Extraocular Movements: Extraocular movements intact.      Pupils: Pupils are equal, round, and reactive to light.   Cardiovascular:      Rate and Rhythm: Normal rate and regular rhythm.      Pulses: Normal pulses.      Heart sounds: Normal heart sounds.   Pulmonary:      Effort: Pulmonary effort is normal.      Breath sounds: Normal breath sounds.   Abdominal:      Palpations: Abdomen is soft.      Comments: No rashes   Musculoskeletal:      Cervical back: Normal range of motion.   Skin:     General: Skin is warm.   Neurological:      General: No focal deficit present.      Mental Status: She is alert and oriented to person, place, and time.   Psychiatric:         Mood and Affect: Mood normal.         Behavior: Behavior normal.         Lab Results: I have personally reviewed pertinent lab results.    Imaging: I have " personally reviewed pertinent reports.    EKG, Pathology, and Other Studies: I have personally reviewed pertinent reports.      [facility]  [address] [address]  [phone]        DATE OF SERVICE:  3/27/24     PHYSICIAN(S):  Attending:   No Staff Documented      Fellow:   No Staff Documented         INDICATION:  Abnormal weight gain     POST-OP DIAGNOSIS:  See the impression below.     PREPROCEDURE:  Informed consent was obtained for the procedure, including sedation.  Risks of perforation, hemorrhage, adverse drug reaction and aspiration were discussed. The patient was placed in the left lateral decubitus position.     Patient was explained about the risks and benefits of the procedure. Risks including but not limited to bleeding, infection, and perforation were explained in detail. Also explained about less than 100% sensitivity with the exam and other alternatives.     PROCEDURE: EGD     DETAILS OF PROCEDURE:   Patient was taken to the procedure room where a time out was performed to confirm correct patient and correct procedure. The patient underwent monitored anesthesia care, which was administered by an anesthesia professional. The patient's blood pressure, heart rate, level of consciousness, respirations, oxygen, ECG and ETCO2 were monitored throughout the procedure. The scope was introduced through the mouth and advanced to the second part of the duodenum. Retroflexion was performed in the fundus. The patient experienced no blood loss. The procedure was not difficult. The patient tolerated the procedure well. There were no apparent adverse events.      ANESTHESIA INFORMATION:  ASA: II  Anesthesia Type: IV Sedation with Anesthesia     MEDICATIONS:  Totals unavailable because the procedure time range is not set         FINDINGS:  Irregular Z-line 36 cm from the incisors  Medium sliding hiatal hernia (type I hiatal hernia) - GE junction 36 cm from the incisors,  diaphragmatic impression 39 cm from the incisors  Moderate abnormal mucosa in the esophagus. Esophagitis grade B  Previous sleeve gastrectomy  Performed single forceps biopsy in the antrum to rule out H. pylori  The stomach and 2nd part of the duodenum appeared normal.  Abnormal mucosa with erosion in the body of the stomach        SPECIMENS:  * No specimens in log *        IMPRESSION:  Irregular Z-line  Medium type I hiatal hernia  Moderate abnormal mucosa in the esophagus  Previous sleeve gastrectomy  Performed forceps biopsy in the antrum to rule out H. pylori  The stomach and 2nd part of the duodenum appeared normal.  Abnormal mucosa with erosion in the body of the stomach        RECOMMENDATION:    There is no recommended follow-up for this procedure.        No Staff Documented       Code Status: [unfilled]  Advance Directive and Living Will:      Power of :    POLST:        Assessment/Plan:    The patient presented to review the preoperative workup and see if bariatric surgery is appropriate and indicated following the extensive preoperative workup and the enrollment in our weight loss program.  Preoperative workup was complete. Results were reviewed with the patient including the blood work results and the endoscopy findings and the biopsy results. We also reviewed the cardiology evaluation.    The patient was determined to be a good candidate for Laparoscopic PEH repair with robotic assist.  ----------------------------------------------------------------------  Smoking: Denies  Blood thinner use: Denies  Home pain medication use and who manages it: Denies  CPAP use: Denies (If yes, sleep study obtained and reminded pt to bring CPAP to hospital)  History of blood clots: Denies  Previous abdominal surgeries: Sleeve, PEH repair, lap norman  Cardiac clearance obtained: Yes   EKG performed: Yes  EGD prior to surgery: Yes  Screening Labs obtained (CBC, CMP): Yes  H. Pylori status: Negative  Medication  allergies: NKDA  SLIM consult Post-Op: No  Reminded patient about 2 week pre-op liquid diet: N/A  10% body weight loss pre-operatively met/discussed: N/A  Consent signed: Yes  Pre-Op ERAS Orders placed: DONE  -----------------------------------------------------------------------  Risks and benefits explained one more time to the patient. Alternatives to surgery and alternative forms of surgery were also explained. Post-surgical commitment and after care programs were explained.  Consent was signed. Questions were answered and concerns were addressed. Patient will need to start the 2 week liquid diet prior to surgery.  Patient wishes to proceed. As per [facility] guidelines, I had a discussion with the patient regarding their CODE STATUS in the perioperative period and the patient is level 1 or FULL CODE STATUS.    Loretta Peñaloza PA-C  Bariatric Surgery   6/27/2024  10:34 AM

## 2024-06-27 NOTE — H&P (VIEW-ONLY)
BARIATRIC HISTORY AND PHYSICAL - BARIATRIC SURGERY  Jazmine Tran 43 y.o. female MRN: [id number]  Unit/Bed#:  Encounter: [id number]      HPI:  Jazmine Tran is a 43 y.o. female who presents to review their preoperative workup and see if they are a good candidate to undergo a foregut procedure.     Hx of sleeve and prior PEH repair    Review of Systems   Constitutional:  Negative for chills and fever.   HENT:  Negative for ear pain and sore throat.    Eyes:  Negative for pain and visual disturbance.   Respiratory:  Negative for cough and shortness of breath.    Cardiovascular:  Negative for chest pain and palpitations.   Gastrointestinal:  Negative for abdominal pain and vomiting.   Genitourinary:  Negative for dysuria and hematuria.   Musculoskeletal:  Negative for arthralgias and back pain.   Skin:  Negative for color change and rash.   Neurological:  Negative for seizures and syncope.   All other systems reviewed and are negative.      Historical Information   Past Medical History:   Diagnosis Date    Arthritis     Right hip    Asthma     allergy induced    Environmental allergies     dust    GERD (gastroesophageal reflux disease)     Migraine with aura     Osteoarthritis     PONV (postoperative nausea and vomiting)     Seasonal allergies     Varicella 4 or 5 years old    Wears contact lenses     wears at night     Past Surgical History:   Procedure Laterality Date    ANKLE SURGERY Left     ANKLE STABALIZATION    BARIATRIC SURGERY  3/2/21    Gastric sleeve    CHOLECYSTECTOMY  2015    COLONOSCOPY      JOINT REPLACEMENT Bilateral 03/2013    Total left hip replacement    [place] ARTHRP ACETBLR/PROX FEM PROSTC AGRFT/ALGRFT Right 04/27/2022    Procedure: ARTHROPLASTY HIP TOTAL ANTERIOR;  Surgeon: Cindi Vences MD;  [facility];  Service: Orthopedics    [place] LAPS [facility] RSTRICTIV PX LONGITUDINAL GASTRECTOMY N/A 03/02/2021    Procedure: ROBOTIC SLEEVE GASTRECTOMY; ROBOTIC PARAESOPHAGEAL HERNIA REPAIR;   "Surgeon: Blanca Shah MD;  Location: [facility] OR;  Service: Bariatrics    TOOTH EXTRACTION      TOTAL HIP ARTHROPLASTY Left      Social History   Social History     Substance and Sexual Activity   Alcohol Use Yes    Comment: Socially     Social History     Substance and Sexual Activity   Drug Use No     Social History     Tobacco Use   Smoking Status Never   Smokeless Tobacco Never     Family History: non-contributory    Meds/Allergies   all medications and allergies reviewed  No Known Allergies    Objective     Current Vitals:   Blood Pressure: 110/60 (06/27/24 1021)  Pulse: 87 (06/27/24 1021)  Temperature: 98.3 °F (36.8 °C) (06/27/24 1021)  Temp Source: Tympanic (06/27/24 1021)  Height: 5' 7\" (170.2 cm) (06/27/24 1021)  Weight - Scale: 82.6 kg (182 lb) (06/27/24 1021)  SpO2: 99 % (06/27/24 1021)  bowel movement  [unfilled]    Invasive Devices       None                   Physical Exam  Constitutional:       Appearance: Normal appearance. She is obese.   HENT:      Head: Normocephalic and atraumatic.      Nose: Nose normal.      Mouth/Throat:      Mouth: Mucous membranes are moist.   Eyes:      Extraocular Movements: Extraocular movements intact.      Pupils: Pupils are equal, round, and reactive to light.   Cardiovascular:      Rate and Rhythm: Normal rate and regular rhythm.      Pulses: Normal pulses.      Heart sounds: Normal heart sounds.   Pulmonary:      Effort: Pulmonary effort is normal.      Breath sounds: Normal breath sounds.   Abdominal:      Palpations: Abdomen is soft.      Comments: No rashes   Musculoskeletal:      Cervical back: Normal range of motion.   Skin:     General: Skin is warm.   Neurological:      General: No focal deficit present.      Mental Status: She is alert and oriented to person, place, and time.   Psychiatric:         Mood and Affect: Mood normal.         Behavior: Behavior normal.         Lab Results: I have personally reviewed pertinent lab results.    Imaging: I have " personally reviewed pertinent reports.    EKG, Pathology, and Other Studies: I have personally reviewed pertinent reports.      [facility]  [address] [address]  [phone]        DATE OF SERVICE:  3/27/24     PHYSICIAN(S):  Attending:   No Staff Documented      Fellow:   No Staff Documented         INDICATION:  Abnormal weight gain     POST-OP DIAGNOSIS:  See the impression below.     PREPROCEDURE:  Informed consent was obtained for the procedure, including sedation.  Risks of perforation, hemorrhage, adverse drug reaction and aspiration were discussed. The patient was placed in the left lateral decubitus position.     Patient was explained about the risks and benefits of the procedure. Risks including but not limited to bleeding, infection, and perforation were explained in detail. Also explained about less than 100% sensitivity with the exam and other alternatives.     PROCEDURE: EGD     DETAILS OF PROCEDURE:   Patient was taken to the procedure room where a time out was performed to confirm correct patient and correct procedure. The patient underwent monitored anesthesia care, which was administered by an anesthesia professional. The patient's blood pressure, heart rate, level of consciousness, respirations, oxygen, ECG and ETCO2 were monitored throughout the procedure. The scope was introduced through the mouth and advanced to the second part of the duodenum. Retroflexion was performed in the fundus. The patient experienced no blood loss. The procedure was not difficult. The patient tolerated the procedure well. There were no apparent adverse events.      ANESTHESIA INFORMATION:  ASA: II  Anesthesia Type: IV Sedation with Anesthesia     MEDICATIONS:  Totals unavailable because the procedure time range is not set         FINDINGS:  Irregular Z-line 36 cm from the incisors  Medium sliding hiatal hernia (type I hiatal hernia) - GE junction 36 cm from the incisors,  diaphragmatic impression 39 cm from the incisors  Moderate abnormal mucosa in the esophagus. Esophagitis grade B  Previous sleeve gastrectomy  Performed single forceps biopsy in the antrum to rule out H. pylori  The stomach and 2nd part of the duodenum appeared normal.  Abnormal mucosa with erosion in the body of the stomach        SPECIMENS:  * No specimens in log *        IMPRESSION:  Irregular Z-line  Medium type I hiatal hernia  Moderate abnormal mucosa in the esophagus  Previous sleeve gastrectomy  Performed forceps biopsy in the antrum to rule out H. pylori  The stomach and 2nd part of the duodenum appeared normal.  Abnormal mucosa with erosion in the body of the stomach        RECOMMENDATION:    There is no recommended follow-up for this procedure.        No Staff Documented       Code Status: [unfilled]  Advance Directive and Living Will:      Power of :    POLST:        Assessment/Plan:    The patient presented to review the preoperative workup and see if bariatric surgery is appropriate and indicated following the extensive preoperative workup and the enrollment in our weight loss program.  Preoperative workup was complete. Results were reviewed with the patient including the blood work results and the endoscopy findings and the biopsy results. We also reviewed the cardiology evaluation.    The patient was determined to be a good candidate for Laparoscopic PEH repair with robotic assist.  ----------------------------------------------------------------------  Smoking: Denies  Blood thinner use: Denies  Home pain medication use and who manages it: Denies  CPAP use: Denies (If yes, sleep study obtained and reminded pt to bring CPAP to hospital)  History of blood clots: Denies  Previous abdominal surgeries: Sleeve, PEH repair, lap norman  Cardiac clearance obtained: Yes   EKG performed: Yes  EGD prior to surgery: Yes  Screening Labs obtained (CBC, CMP): Yes  H. Pylori status: Negative  Medication  allergies: NKDA  SLIM consult Post-Op: No  Reminded patient about 2 week pre-op liquid diet: N/A  10% body weight loss pre-operatively met/discussed: N/A  Consent signed: Yes  Pre-Op ERAS Orders placed: DONE  -----------------------------------------------------------------------  Risks and benefits explained one more time to the patient. Alternatives to surgery and alternative forms of surgery were also explained. Post-surgical commitment and after care programs were explained.  Consent was signed. Questions were answered and concerns were addressed. Patient will need to start the 2 week liquid diet prior to surgery.  Patient wishes to proceed. As per [facility] guidelines, I had a discussion with the patient regarding their CODE STATUS in the perioperative period and the patient is level 1 or FULL CODE STATUS.    Loretta Peñaloza PA-C  Bariatric Surgery   6/27/2024  10:34 AM

## 2024-07-01 DIAGNOSIS — Z98.84 BARIATRIC SURGERY STATUS: Primary | ICD-10-CM

## 2024-07-01 NOTE — TELEPHONE ENCOUNTER
PO meds for  Laparoscopic  Paraesophageal  Hernia Repair with Robotics  with Dr. Schwartz  on 07/08/2024.          Opioid   Agreement On file not on med list .

## 2024-07-03 RX ORDER — OXYCODONE HYDROCHLORIDE 5 MG/1
5 TABLET ORAL EVERY 4 HOURS PRN
Qty: 5 TABLET | Refills: 0 | Status: SHIPPED | OUTPATIENT
Start: 2024-07-09

## 2024-07-03 RX ORDER — OMEPRAZOLE 20 MG/1
20 CAPSULE, DELAYED RELEASE ORAL DAILY
Qty: 90 CAPSULE | Refills: 1 | Status: SHIPPED | OUTPATIENT
Start: 2024-07-03

## 2024-07-08 ENCOUNTER — HOSPITAL ENCOUNTER (OUTPATIENT)
Facility: HOSPITAL | Age: 44
Setting detail: OUTPATIENT SURGERY
Discharge: HOME | End: 2024-07-09
Attending: SURGERY | Admitting: SURGERY
Payer: COMMERCIAL

## 2024-07-08 ENCOUNTER — ANESTHESIA (OUTPATIENT)
Dept: PERIOP | Facility: HOSPITAL | Age: 44
End: 2024-07-08
Payer: COMMERCIAL

## 2024-07-08 DIAGNOSIS — R14.0 BLOATING: ICD-10-CM

## 2024-07-08 DIAGNOSIS — K21.9 ESOPHAGEAL REFLUX: ICD-10-CM

## 2024-07-08 DIAGNOSIS — R12 HEARTBURN: ICD-10-CM

## 2024-07-08 DIAGNOSIS — K44.9 HIATAL HERNIA: Primary | ICD-10-CM

## 2024-07-08 DIAGNOSIS — R14.2 BELCHING: ICD-10-CM

## 2024-07-08 LAB
EXT PREGNANCY TEST URINE: NEGATIVE
EXT. CONTROL: NORMAL

## 2024-07-08 PROCEDURE — 43282 LAP PARAESOPH HER RPR W/MESH: CPT | Performed by: SURGERY

## 2024-07-08 PROCEDURE — C9290 INJ, BUPIVACAINE LIPOSOME: HCPCS | Performed by: PHYSICIAN ASSISTANT

## 2024-07-08 PROCEDURE — S2900 ROBOTIC SURGICAL SYSTEM: HCPCS | Performed by: SURGERY

## 2024-07-08 PROCEDURE — 43282 LAP PARAESOPH HER RPR W/MESH: CPT | Performed by: STUDENT IN AN ORGANIZED HEALTH CARE EDUCATION/TRAINING PROGRAM

## 2024-07-08 PROCEDURE — 88302 TISSUE EXAM BY PATHOLOGIST: CPT | Performed by: PATHOLOGY

## 2024-07-08 PROCEDURE — 81025 URINE PREGNANCY TEST: CPT | Performed by: SURGERY

## 2024-07-08 PROCEDURE — C1781 MESH (IMPLANTABLE): HCPCS | Performed by: SURGERY

## 2024-07-08 DEVICE — BIO-A TISSUE REINFORCEMENT 7CMX10CM
Type: IMPLANTABLE DEVICE | Status: FUNCTIONAL
Brand: GORE BIO-A TISSUE REINFORCEMENT

## 2024-07-08 RX ORDER — FENTANYL CITRATE 50 UG/ML
INJECTION, SOLUTION INTRAMUSCULAR; INTRAVENOUS AS NEEDED
Status: DISCONTINUED | OUTPATIENT
Start: 2024-07-08 | End: 2024-07-08

## 2024-07-08 RX ORDER — CELECOXIB 200 MG/1
200 CAPSULE ORAL ONCE
Status: COMPLETED | OUTPATIENT
Start: 2024-07-08 | End: 2024-07-08

## 2024-07-08 RX ORDER — BUPIVACAINE HYDROCHLORIDE 5 MG/ML
INJECTION, SOLUTION EPIDURAL; INTRACAUDAL AS NEEDED
Status: DISCONTINUED | OUTPATIENT
Start: 2024-07-08 | End: 2024-07-08 | Stop reason: HOSPADM

## 2024-07-08 RX ORDER — FAMOTIDINE 10 MG/ML
20 INJECTION, SOLUTION INTRAVENOUS EVERY 12 HOURS SCHEDULED
Status: DISCONTINUED | OUTPATIENT
Start: 2024-07-08 | End: 2024-07-09 | Stop reason: HOSPADM

## 2024-07-08 RX ORDER — SODIUM CHLORIDE, SODIUM LACTATE, POTASSIUM CHLORIDE, CALCIUM CHLORIDE 600; 310; 30; 20 MG/100ML; MG/100ML; MG/100ML; MG/100ML
75 INJECTION, SOLUTION INTRAVENOUS CONTINUOUS
Status: DISCONTINUED | OUTPATIENT
Start: 2024-07-08 | End: 2024-07-09 | Stop reason: HOSPADM

## 2024-07-08 RX ORDER — ENOXAPARIN SODIUM 100 MG/ML
40 INJECTION SUBCUTANEOUS
Status: DISCONTINUED | OUTPATIENT
Start: 2024-07-09 | End: 2024-07-09 | Stop reason: HOSPADM

## 2024-07-08 RX ORDER — ROCURONIUM BROMIDE 10 MG/ML
INJECTION, SOLUTION INTRAVENOUS AS NEEDED
Status: DISCONTINUED | OUTPATIENT
Start: 2024-07-08 | End: 2024-07-08

## 2024-07-08 RX ORDER — LIDOCAINE HYDROCHLORIDE 20 MG/ML
INJECTION, SOLUTION EPIDURAL; INFILTRATION; INTRACAUDAL; PERINEURAL AS NEEDED
Status: DISCONTINUED | OUTPATIENT
Start: 2024-07-08 | End: 2024-07-08

## 2024-07-08 RX ORDER — OXYCODONE HCL 5 MG/5 ML
5 SOLUTION, ORAL ORAL EVERY 4 HOURS PRN
Status: DISCONTINUED | OUTPATIENT
Start: 2024-07-08 | End: 2024-07-09 | Stop reason: HOSPADM

## 2024-07-08 RX ORDER — ACETAMINOPHEN 10 MG/ML
1000 INJECTION, SOLUTION INTRAVENOUS EVERY 8 HOURS
Status: DISCONTINUED | OUTPATIENT
Start: 2024-07-08 | End: 2024-07-09 | Stop reason: HOSPADM

## 2024-07-08 RX ORDER — ONDANSETRON 2 MG/ML
4 INJECTION INTRAMUSCULAR; INTRAVENOUS ONCE AS NEEDED
Status: DISCONTINUED | OUTPATIENT
Start: 2024-07-08 | End: 2024-07-08 | Stop reason: HOSPADM

## 2024-07-08 RX ORDER — HYDROMORPHONE HCL/PF 1 MG/ML
0.5 SYRINGE (ML) INJECTION
Status: DISCONTINUED | OUTPATIENT
Start: 2024-07-08 | End: 2024-07-08 | Stop reason: HOSPADM

## 2024-07-08 RX ORDER — KETOROLAC TROMETHAMINE 30 MG/ML
15 INJECTION, SOLUTION INTRAMUSCULAR; INTRAVENOUS EVERY 6 HOURS
Status: DISCONTINUED | OUTPATIENT
Start: 2024-07-08 | End: 2024-07-09 | Stop reason: HOSPADM

## 2024-07-08 RX ORDER — HYDRALAZINE HYDROCHLORIDE 20 MG/ML
10 INJECTION INTRAMUSCULAR; INTRAVENOUS ONCE AS NEEDED
Status: DISCONTINUED | OUTPATIENT
Start: 2024-07-08 | End: 2024-07-08 | Stop reason: HOSPADM

## 2024-07-08 RX ORDER — FENTANYL CITRATE/PF 50 MCG/ML
50 SYRINGE (ML) INJECTION
Status: DISCONTINUED | OUTPATIENT
Start: 2024-07-08 | End: 2024-07-08 | Stop reason: HOSPADM

## 2024-07-08 RX ORDER — ONDANSETRON 2 MG/ML
4 INJECTION INTRAMUSCULAR; INTRAVENOUS EVERY 6 HOURS PRN
Status: DISCONTINUED | OUTPATIENT
Start: 2024-07-08 | End: 2024-07-09 | Stop reason: HOSPADM

## 2024-07-08 RX ORDER — SODIUM CHLORIDE 9 MG/ML
125 INJECTION, SOLUTION INTRAVENOUS CONTINUOUS
Status: DISCONTINUED | OUTPATIENT
Start: 2024-07-08 | End: 2024-07-09 | Stop reason: HOSPADM

## 2024-07-08 RX ORDER — PROPOFOL 10 MG/ML
INJECTION, EMULSION INTRAVENOUS AS NEEDED
Status: DISCONTINUED | OUTPATIENT
Start: 2024-07-08 | End: 2024-07-08

## 2024-07-08 RX ORDER — METOCLOPRAMIDE HYDROCHLORIDE 5 MG/ML
10 INJECTION INTRAMUSCULAR; INTRAVENOUS EVERY 6 HOURS PRN
Status: DISCONTINUED | OUTPATIENT
Start: 2024-07-08 | End: 2024-07-09 | Stop reason: HOSPADM

## 2024-07-08 RX ORDER — CEFAZOLIN SODIUM 2 G/50ML
2000 SOLUTION INTRAVENOUS ONCE
Status: COMPLETED | OUTPATIENT
Start: 2024-07-08 | End: 2024-07-08

## 2024-07-08 RX ORDER — HEPARIN SODIUM 5000 [USP'U]/ML
5000 INJECTION, SOLUTION INTRAVENOUS; SUBCUTANEOUS ONCE
Status: COMPLETED | OUTPATIENT
Start: 2024-07-08 | End: 2024-07-08

## 2024-07-08 RX ORDER — LABETALOL HYDROCHLORIDE 5 MG/ML
5 INJECTION, SOLUTION INTRAVENOUS ONCE AS NEEDED
Status: DISCONTINUED | OUTPATIENT
Start: 2024-07-08 | End: 2024-07-08 | Stop reason: HOSPADM

## 2024-07-08 RX ORDER — SIMETHICONE 80 MG
80 TABLET,CHEWABLE ORAL 4 TIMES DAILY PRN
Status: DISCONTINUED | OUTPATIENT
Start: 2024-07-08 | End: 2024-07-09 | Stop reason: HOSPADM

## 2024-07-08 RX ORDER — MAGNESIUM HYDROXIDE 1200 MG/15ML
LIQUID ORAL AS NEEDED
Status: DISCONTINUED | OUTPATIENT
Start: 2024-07-08 | End: 2024-07-08 | Stop reason: HOSPADM

## 2024-07-08 RX ORDER — DEXAMETHASONE SODIUM PHOSPHATE 10 MG/ML
INJECTION, SOLUTION INTRAMUSCULAR; INTRAVENOUS AS NEEDED
Status: DISCONTINUED | OUTPATIENT
Start: 2024-07-08 | End: 2024-07-08

## 2024-07-08 RX ORDER — PROMETHAZINE HYDROCHLORIDE 25 MG/ML
25 INJECTION, SOLUTION INTRAMUSCULAR; INTRAVENOUS EVERY 6 HOURS PRN
Status: DISCONTINUED | OUTPATIENT
Start: 2024-07-08 | End: 2024-07-09 | Stop reason: HOSPADM

## 2024-07-08 RX ORDER — EPHEDRINE SULFATE 50 MG/ML
INJECTION INTRAVENOUS AS NEEDED
Status: DISCONTINUED | OUTPATIENT
Start: 2024-07-08 | End: 2024-07-08

## 2024-07-08 RX ORDER — MEPERIDINE HYDROCHLORIDE 25 MG/ML
12.5 INJECTION INTRAMUSCULAR; INTRAVENOUS; SUBCUTANEOUS ONCE AS NEEDED
Status: DISCONTINUED | OUTPATIENT
Start: 2024-07-08 | End: 2024-07-08 | Stop reason: HOSPADM

## 2024-07-08 RX ORDER — DIPHENHYDRAMINE HCL 25 MG
25 TABLET ORAL
Status: DISCONTINUED | OUTPATIENT
Start: 2024-07-08 | End: 2024-07-09 | Stop reason: HOSPADM

## 2024-07-08 RX ORDER — OXYCODONE HCL 5 MG/5 ML
10 SOLUTION, ORAL ORAL EVERY 4 HOURS PRN
Status: DISCONTINUED | OUTPATIENT
Start: 2024-07-08 | End: 2024-07-09 | Stop reason: HOSPADM

## 2024-07-08 RX ORDER — PROMETHAZINE HYDROCHLORIDE 25 MG/ML
25 INJECTION, SOLUTION INTRAMUSCULAR; INTRAVENOUS EVERY 6 HOURS PRN
Status: DISCONTINUED | OUTPATIENT
Start: 2024-07-08 | End: 2024-07-08

## 2024-07-08 RX ORDER — MIDAZOLAM HYDROCHLORIDE 2 MG/2ML
INJECTION, SOLUTION INTRAMUSCULAR; INTRAVENOUS AS NEEDED
Status: DISCONTINUED | OUTPATIENT
Start: 2024-07-08 | End: 2024-07-08

## 2024-07-08 RX ORDER — PROMETHAZINE HYDROCHLORIDE 25 MG/ML
6.25 INJECTION, SOLUTION INTRAMUSCULAR; INTRAVENOUS ONCE AS NEEDED
Status: DISCONTINUED | OUTPATIENT
Start: 2024-07-08 | End: 2024-07-08 | Stop reason: HOSPADM

## 2024-07-08 RX ORDER — ACETAMINOPHEN 10 MG/ML
1000 INJECTION, SOLUTION INTRAVENOUS ONCE
Status: COMPLETED | OUTPATIENT
Start: 2024-07-08 | End: 2024-07-08

## 2024-07-08 RX ORDER — ONDANSETRON 2 MG/ML
INJECTION INTRAMUSCULAR; INTRAVENOUS AS NEEDED
Status: DISCONTINUED | OUTPATIENT
Start: 2024-07-08 | End: 2024-07-08

## 2024-07-08 RX ORDER — MORPHINE SULFATE 4 MG/ML
4 INJECTION, SOLUTION INTRAMUSCULAR; INTRAVENOUS EVERY 4 HOURS PRN
Status: DISCONTINUED | OUTPATIENT
Start: 2024-07-08 | End: 2024-07-09 | Stop reason: HOSPADM

## 2024-07-08 RX ADMIN — CELECOXIB 200 MG: 200 CAPSULE ORAL at 07:53

## 2024-07-08 RX ADMIN — MIDAZOLAM 2 MG: 1 INJECTION INTRAMUSCULAR; INTRAVENOUS at 09:39

## 2024-07-08 RX ADMIN — ROCURONIUM BROMIDE 50 MG: 50 INJECTION, SOLUTION INTRAVENOUS at 09:42

## 2024-07-08 RX ADMIN — FOSAPREPITANT DIMEGLUMINE 150 MG: 150 INJECTION, POWDER, LYOPHILIZED, FOR SOLUTION INTRAVENOUS at 08:13

## 2024-07-08 RX ADMIN — PROPOFOL 160 MG: 10 INJECTION, EMULSION INTRAVENOUS at 09:42

## 2024-07-08 RX ADMIN — EPHEDRINE SULFATE 5 MG: 50 INJECTION, SOLUTION INTRAVENOUS at 09:51

## 2024-07-08 RX ADMIN — SUGAMMADEX 200 MG: 100 INJECTION, SOLUTION INTRAVENOUS at 10:52

## 2024-07-08 RX ADMIN — ACETAMINOPHEN 1000 MG: 10 INJECTION INTRAVENOUS at 12:25

## 2024-07-08 RX ADMIN — EPHEDRINE SULFATE 7.5 MG: 50 INJECTION, SOLUTION INTRAVENOUS at 09:48

## 2024-07-08 RX ADMIN — SODIUM CHLORIDE: 0.9 INJECTION, SOLUTION INTRAVENOUS at 10:55

## 2024-07-08 RX ADMIN — CEFAZOLIN SODIUM 2000 MG: 2 SOLUTION INTRAVENOUS at 09:36

## 2024-07-08 RX ADMIN — SODIUM CHLORIDE 125 ML/HR: 0.9 INJECTION, SOLUTION INTRAVENOUS at 08:12

## 2024-07-08 RX ADMIN — ONDANSETRON 4 MG: 2 INJECTION INTRAMUSCULAR; INTRAVENOUS at 09:42

## 2024-07-08 RX ADMIN — FENTANYL CITRATE 50 MCG: 50 INJECTION INTRAMUSCULAR; INTRAVENOUS at 10:55

## 2024-07-08 RX ADMIN — HEPARIN SODIUM 5000 UNITS: 5000 INJECTION INTRAVENOUS; SUBCUTANEOUS at 08:15

## 2024-07-08 RX ADMIN — SODIUM CHLORIDE: 0.9 INJECTION, SOLUTION INTRAVENOUS at 09:57

## 2024-07-08 RX ADMIN — FAMOTIDINE 20 MG: 10 INJECTION, SOLUTION INTRAVENOUS at 23:40

## 2024-07-08 RX ADMIN — ACETAMINOPHEN 1000 MG: 10 INJECTION INTRAVENOUS at 09:30

## 2024-07-08 RX ADMIN — FENTANYL CITRATE 50 MCG: 50 INJECTION INTRAMUSCULAR; INTRAVENOUS at 09:42

## 2024-07-08 RX ADMIN — KETOROLAC TROMETHAMINE 15 MG: 30 INJECTION, SOLUTION INTRAMUSCULAR; INTRAVENOUS at 17:07

## 2024-07-08 RX ADMIN — LIDOCAINE HYDROCHLORIDE 100 MG: 20 INJECTION, SOLUTION EPIDURAL; INFILTRATION; INTRACAUDAL at 09:42

## 2024-07-08 RX ADMIN — DEXAMETHASONE SODIUM PHOSPHATE 10 MG: 10 INJECTION INTRAMUSCULAR; INTRAVENOUS at 09:42

## 2024-07-08 RX ADMIN — KETOROLAC TROMETHAMINE 15 MG: 30 INJECTION, SOLUTION INTRAMUSCULAR; INTRAVENOUS at 12:26

## 2024-07-08 RX ADMIN — FENTANYL CITRATE 50 MCG: 50 INJECTION INTRAMUSCULAR; INTRAVENOUS at 10:03

## 2024-07-08 RX ADMIN — KETOROLAC TROMETHAMINE 15 MG: 30 INJECTION, SOLUTION INTRAMUSCULAR; INTRAVENOUS at 23:40

## 2024-07-08 RX ADMIN — ACETAMINOPHEN 1000 MG: 10 INJECTION INTRAVENOUS at 19:22

## 2024-07-08 NOTE — ANESTHESIA PREPROCEDURE EVALUATION
Procedure:  ROBOTIC PEHR (Abdomen)    Relevant Problems   ANESTHESIA   (+) PONV (postoperative nausea and vomiting)      CARDIO   (+) Migraine with aura      GI/HEPATIC   (+) GERD (gastroesophageal reflux disease)      MUSCULOSKELETAL   (+) Primary osteoarthritis of right hip      NEURO/PSYCH   (+) Migraine with aura      PULMONARY   (+) Asthma      Surgery/Wound/Pain   (+) H/O gastric sleeve      Orthopedic/Musculoskeletal   (+) Status post total hip replacement, right        Physical Exam    Airway    Mallampati score: II  TM Distance: >3 FB  Neck ROM: full     Dental   No notable dental hx     Cardiovascular  Rhythm: regular, Rate: normal, Cardiovascular exam normal    Pulmonary   Breath sounds clear to auscultation    Other Findings  post-pubertal.      Anesthesia Plan  ASA Score- 2     Anesthesia Type- general with ASA Monitors.         Additional Monitors:     Airway Plan:            Plan Factors-    Chart reviewed.   Existing labs reviewed. Patient summary reviewed.                  Induction-     Postoperative Plan-         Informed Consent- Anesthetic plan and risks discussed with patient.

## 2024-07-08 NOTE — INTERVAL H&P NOTE
H&P reviewed. After examining the patient I find no changes in the patients condition since the H&P had been written.    Vitals:    07/08/24 0755   BP: 120/69   Pulse: 61   Resp: 18   Temp: (!) 97.2 °F (36.2 °C)   SpO2: 99%

## 2024-07-08 NOTE — ANESTHESIA POSTPROCEDURE EVALUATION
"Post-Op Assessment Note    CV Status:  Stable    Pain management: adequate       Mental Status:  Alert     Post Op Vitals Reviewed: Yes    No anethesia notable event occurred.    Staff: CRNA           /69   Pulse 61   Temp (!) 97.2 °F (36.2 °C) (Temporal)   Resp 18   Ht 5' 7\" (1.702 m)   Wt 82.3 kg (181 lb 7 oz)   LMP  (LMP Unknown)   SpO2 99%   BMI 28.42 kg/m²       BP      Temp      Pulse     Resp      SpO2        "

## 2024-07-08 NOTE — OP NOTE
OPERATIVE REPORT  PATIENT NAME: Jazmine Tran    :  1980  MRN: [id number]  Pt Location: [facility] OR ROOM 08    SURGERY DATE: 2024    Surgeons and Role:     * Blanca Shah MD - Primary     * Kel Wade MD    Preop Diagnosis:  Esophageal reflux K21.9  Hiatal hernia K44.9  Heartburn R12  Bloating R14.0  Belching R14.2    Post-Op Diagnosis Codes:     * Esophageal reflux [K21.9]     * Hiatal hernia [K44.9]     * Heartburn [R12]     * Bloating [R14.0]     * Belching [R14.2]    Procedure(s):  ROBOTIC PEHR    Specimen(s):  ID Type Source Tests Collected by Time Destination   1 : hernia sac Tissue Soft Tissue, Other TISSUE EXAM Blanca Shah MD 2024 [id number]        Estimated Blood Loss:   20 ml    Drains:  * No LDAs found *    Anesthesia Type:   General    Operative Indications:  Esophageal reflux K21.9  Hiatal hernia K44.9  Heartburn R12  Bloating R14.0  Belching R14.2      Operative Findings:  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    Patient Disposition:  hemodynamically stable      No qualified resident available to assist  Assistance was necessary for traction counter traction and assistance with stapling and intraop endoscopy  Description of the procedure:  The patient was brought to the operating room and placed in a supine position. The patient received a dose of IV antibiotics and a dose of subcutaneous Lovenox, prior to the procedure. The patient was induced under general endotracheal anesthesia. The abdominal wall was prepped and draped under sterile conditions in the usual fashion. The procedure was started by obtaining access to the abdominal cavity using a Veress needle to the left side of the midline around 6 inches from the xiphoid process the abdominal cavity was insufflated  with CO2 to a pressure of 15 mmHg. After that, the abdomen was entered with an 8 mm trocar using an Optiview trocar under direct visualization. At that point, two 8-mm trocar were placed on the right side of the abdominal wall, under direct visualization, and two 8- mm and 12-mm trocars were placed on the left side of the abdominal wall, also under direct visualization. The patient was then placed in a reverse Trendelenburg position. A Jovan retractor was placed through a small stab incision below the xiphoid and was used to retract the left lobe of the liver in a medial fashion.  There was evidence of previous hiatal hernia repair and an anterior hernia recurrence I started by identifying the caudate lobe and then the caudate lobe was  from the right brandie using sharp dissection.    I then started the dissection on the right side by taking the pars flaccida down all the way up to the right brandie the right brandie was dissected away from the hernia sac and esophageal wall, the right vagus nerve was clearly identified and kept out of harm's way.  I then turned my attention to the left brandie, in order to expose left brandie clearly I mobilized the gastric pouch using the vessel sealer of note the upper portion of the gastric pouch had herniated into the thoracic cavity.  The pouch was reduced and the hernia sac dissected away from the left brandie and then the plane of dissection around the left brandie was connected anteriorly with my plane of dissection that was developed previously while dissecting the right brandie.  The phrenoesophageal ligament was also taken down in its entirety and the hernia sac dissected anteriorly away from the esophageal wall left vagus nerve was identified and preserved.  At that point the hernia sac was completely dissected and excised. The decision was then made to repair the hernia posteriorly. Right brandie and left brandie were dissected away from the hernia sac and skeletonized all the way  down to the confluence. Esophagus was kept out of harm's way during the dissection. Both vagi were also identified and preserved as previously mentioned.  Dissection was carried all the way up in the thoracic cavity to obtain more length of the esophagus. The dissection was completed circumferentially around the esophagus. We had at least 3 cm of the lower esophagus in an intra-abdominal location by the end of the dissection.   Hernia sac was completely dissected and excised. Right brandie and left brandie were then approximated using 0 Ethibond sutures placed in an interrupted fashion. A bio a mesh was trimmed to accommodate the esophagus and was placed around the esophagus and secured in place with simple 0 Ethibond sutures in a reverse C fashion.     We then removed the Jovan liver retractor.  All the trocars were removed under direct visualization, and the skin edges were approximated using 4-0 Monocryl in an interrupted, inverted fashion. Histoacryl was then applied to the skin incisions.      The patient was extubated and transferred to the PACU in stable condition.     SIGNATURE: Blanca Shah MD  DATE: July 8, 2024  TIME: 10:52 AM

## 2024-07-08 NOTE — PLAN OF CARE
Problem: PAIN - ADULT  Goal: Verbalizes/displays adequate comfort level or baseline comfort level  Description: Interventions:  - Encourage patient to monitor pain and request assistance  - Assess pain using appropriate pain scale  - Administer analgesics based on type and severity of pain and evaluate response  - Implement non-pharmacological measures as appropriate and evaluate response  - Consider cultural and social influences on pain and pain management  - Notify physician/advanced practitioner if interventions unsuccessful or patient reports new pain  Outcome: Progressing     Problem: INFECTION - ADULT  Goal: Absence or prevention of progression during hospitalization  Description: INTERVENTIONS:  - Assess and monitor for signs and symptoms of infection  - Monitor lab/diagnostic results  - Monitor all insertion sites, i.e. indwelling lines, tubes, and drains  - Monitor endotracheal if appropriate and nasal secretions for changes in amount and color  - [place] appropriate cooling/warming therapies per order  - Administer medications as ordered  - Instruct and encourage patient and family to use good hand hygiene technique  - Identify and instruct in appropriate isolation precautions for identified infection/condition  Outcome: Progressing     Problem: DISCHARGE PLANNING  Goal: Discharge to home or other facility with appropriate resources  Description: INTERVENTIONS:  - Identify barriers to discharge w/patient and caregiver  - Arrange for needed discharge resources and transportation as appropriate  - Identify discharge learning needs (meds, wound care, etc.)  - Arrange for interpretive services to assist at discharge as needed  - Refer to Case Management Department for coordinating discharge planning if the patient needs post-hospital services based on physician/advanced practitioner order or complex needs related to functional status, cognitive ability, or social support system  Outcome: Progressing      Problem: Knowledge Deficit  Goal: Patient/family/caregiver demonstrates understanding of disease process, treatment plan, medications, and discharge instructions  Description: Complete learning assessment and assess knowledge base.  Interventions:  - Provide teaching at level of understanding  - Provide teaching via preferred learning methods  Outcome: Progressing     Problem: GASTROINTESTINAL - ADULT  Goal: Minimal or absence of nausea and/or vomiting  Description: INTERVENTIONS:  - Administer IV fluids if ordered to ensure adequate hydration  - Maintain NPO status until nausea and vomiting are resolved  - Nasogastric tube if ordered  - Administer ordered antiemetic medications as needed  - Provide nonpharmacologic comfort measures as appropriate  - Advance diet as tolerated, if ordered  - Consider nutrition services referral to assist patient with adequate nutrition and appropriate food choices  Outcome: Progressing  Goal: Maintains adequate nutritional intake  Description: INTERVENTIONS:  - Monitor percentage of each meal consumed  - Identify factors contributing to decreased intake, treat as appropriate  - Assist with meals as needed  - Monitor I&O, weight, and lab values if indicated  - Obtain nutrition services referral as needed  Outcome: Progressing     Problem: GENITOURINARY - ADULT  Goal: Absence of urinary retention  Description: INTERVENTIONS:  - Assess patient’s ability to void and empty bladder  - Monitor I/O  - Bladder scan as needed  - Discuss with physician/AP medications to alleviate retention as needed  - Discuss catheterization for long term situations as appropriate  Outcome: Progressing

## 2024-07-09 ENCOUNTER — APPOINTMENT (OUTPATIENT)
Dept: RADIOLOGY | Facility: HOSPITAL | Age: 44
End: 2024-07-09
Payer: COMMERCIAL

## 2024-07-09 VITALS
HEART RATE: 65 BPM | OXYGEN SATURATION: 99 % | HEIGHT: 67 IN | TEMPERATURE: 98.4 F | WEIGHT: 181.44 LBS | BODY MASS INDEX: 28.48 KG/M2 | SYSTOLIC BLOOD PRESSURE: 119 MMHG | RESPIRATION RATE: 18 BRPM | DIASTOLIC BLOOD PRESSURE: 58 MMHG

## 2024-07-09 PROBLEM — K44.9 HIATAL HERNIA: Status: ACTIVE | Noted: 2024-07-09

## 2024-07-09 LAB
ANION GAP SERPL CALCULATED.3IONS-SCNC: 8 MMOL/L (ref 4–13)
BUN SERPL-MCNC: 11 MG/DL (ref 5–25)
CALCIUM SERPL-MCNC: 8.6 MG/DL (ref 8.4–10.2)
CHLORIDE SERPL-SCNC: 105 MMOL/L (ref 96–108)
CO2 SERPL-SCNC: 23 MMOL/L (ref 21–32)
CREAT SERPL-MCNC: 0.77 MG/DL (ref 0.6–1.3)
ERYTHROCYTE [DISTWIDTH] IN BLOOD BY AUTOMATED COUNT: 13.7 % (ref 11.6–15.1)
GFR SERPL CREATININE-BSD FRML MDRD: 93 ML/MIN/1.73SQ M
GLUCOSE SERPL-MCNC: 122 MG/DL (ref 65–140)
HCT VFR BLD AUTO: 36.7 % (ref 34.8–46.1)
HGB BLD-MCNC: 12.2 G/DL (ref 11.5–15.4)
MCH RBC QN AUTO: 29.2 PG (ref 26.8–34.3)
MCHC RBC AUTO-ENTMCNC: 33.2 G/DL (ref 31.4–37.4)
MCV RBC AUTO: 88 FL (ref 82–98)
PLATELET # BLD AUTO: 242 THOUSANDS/UL (ref 149–390)
PMV BLD AUTO: 10.5 FL (ref 8.9–12.7)
POTASSIUM SERPL-SCNC: 4.2 MMOL/L (ref 3.5–5.3)
RBC # BLD AUTO: 4.18 MILLION/UL (ref 3.81–5.12)
SODIUM SERPL-SCNC: 136 MMOL/L (ref 135–147)
WBC # BLD AUTO: 9.68 THOUSAND/UL (ref 4.31–10.16)

## 2024-07-09 PROCEDURE — 74240 X-RAY XM UPR GI TRC 1CNTRST: CPT

## 2024-07-09 PROCEDURE — 99024 POSTOP FOLLOW-UP VISIT: CPT | Performed by: STUDENT IN AN ORGANIZED HEALTH CARE EDUCATION/TRAINING PROGRAM

## 2024-07-09 PROCEDURE — 80048 BASIC METABOLIC PNL TOTAL CA: CPT | Performed by: STUDENT IN AN ORGANIZED HEALTH CARE EDUCATION/TRAINING PROGRAM

## 2024-07-09 PROCEDURE — NC001 PR NO CHARGE: Performed by: SURGERY

## 2024-07-09 PROCEDURE — 85027 COMPLETE CBC AUTOMATED: CPT | Performed by: STUDENT IN AN ORGANIZED HEALTH CARE EDUCATION/TRAINING PROGRAM

## 2024-07-09 RX ORDER — ACETAMINOPHEN 160 MG/5ML
975 SUSPENSION ORAL EVERY 8 HOURS
Qty: 640.5 ML | Refills: 0 | Status: SHIPPED | OUTPATIENT
Start: 2024-07-09 | End: 2024-07-16

## 2024-07-09 RX ADMIN — ACETAMINOPHEN 1000 MG: 10 INJECTION INTRAVENOUS at 03:23

## 2024-07-09 RX ADMIN — ENOXAPARIN SODIUM 40 MG: 40 INJECTION SUBCUTANEOUS at 10:40

## 2024-07-09 RX ADMIN — KETOROLAC TROMETHAMINE 15 MG: 30 INJECTION, SOLUTION INTRAMUSCULAR; INTRAVENOUS at 10:40

## 2024-07-09 RX ADMIN — FAMOTIDINE 20 MG: 10 INJECTION, SOLUTION INTRAVENOUS at 07:49

## 2024-07-09 RX ADMIN — IOHEXOL 50 ML: 350 INJECTION, SOLUTION INTRAVENOUS at 09:15

## 2024-07-09 RX ADMIN — ACETAMINOPHEN 1000 MG: 10 INJECTION INTRAVENOUS at 10:39

## 2024-07-09 RX ADMIN — KETOROLAC TROMETHAMINE 15 MG: 30 INJECTION, SOLUTION INTRAMUSCULAR; INTRAVENOUS at 05:50

## 2024-07-09 NOTE — PLAN OF CARE
Problem: PAIN - ADULT  Goal: Verbalizes/displays adequate comfort level or baseline comfort level  Description: Interventions:  - Encourage patient to monitor pain and request assistance  - Assess pain using appropriate pain scale  - Administer analgesics based on type and severity of pain and evaluate response  - Implement non-pharmacological measures as appropriate and evaluate response  - Consider cultural and social influences on pain and pain management  - Notify physician/advanced practitioner if interventions unsuccessful or patient reports new pain  Outcome: Adequate for Discharge     Problem: INFECTION - ADULT  Goal: Absence or prevention of progression during hospitalization  Description: INTERVENTIONS:  - Assess and monitor for signs and symptoms of infection  - Monitor lab/diagnostic results  - Monitor all insertion sites, i.e. indwelling lines, tubes, and drains  - Monitor endotracheal if appropriate and nasal secretions for changes in amount and color  - [place] appropriate cooling/warming therapies per order  - Administer medications as ordered  - Instruct and encourage patient and family to use good hand hygiene technique  - Identify and instruct in appropriate isolation precautions for identified infection/condition  Outcome: Adequate for Discharge     Problem: DISCHARGE PLANNING  Goal: Discharge to home or other facility with appropriate resources  Description: INTERVENTIONS:  - Identify barriers to discharge w/patient and caregiver  - Arrange for needed discharge resources and transportation as appropriate  - Identify discharge learning needs (meds, wound care, etc.)  - Arrange for interpretive services to assist at discharge as needed  - Refer to Case Management Department for coordinating discharge planning if the patient needs post-hospital services based on physician/advanced practitioner order or complex needs related to functional status, cognitive ability, or social support  system  Outcome: Adequate for Discharge     Problem: Knowledge Deficit  Goal: Patient/family/caregiver demonstrates understanding of disease process, treatment plan, medications, and discharge instructions  Description: Complete learning assessment and assess knowledge base.  Interventions:  - Provide teaching at level of understanding  - Provide teaching via preferred learning methods  Outcome: Adequate for Discharge     Problem: GASTROINTESTINAL - ADULT  Goal: Minimal or absence of nausea and/or vomiting  Description: INTERVENTIONS:  - Administer IV fluids if ordered to ensure adequate hydration  - Maintain NPO status until nausea and vomiting are resolved  - Nasogastric tube if ordered  - Administer ordered antiemetic medications as needed  - Provide nonpharmacologic comfort measures as appropriate  - Advance diet as tolerated, if ordered  - Consider nutrition services referral to assist patient with adequate nutrition and appropriate food choices  Outcome: Adequate for Discharge  Goal: Maintains adequate nutritional intake  Description: INTERVENTIONS:  - Monitor percentage of each meal consumed  - Identify factors contributing to decreased intake, treat as appropriate  - Assist with meals as needed  - Monitor I&O, weight, and lab values if indicated  - Obtain nutrition services referral as needed  Outcome: Adequate for Discharge     Problem: GENITOURINARY - ADULT  Goal: Absence of urinary retention  Description: INTERVENTIONS:  - Assess patient’s ability to void and empty bladder  - Monitor I/O  - Bladder scan as needed  - Discuss with physician/AP medications to alleviate retention as needed  - Discuss catheterization for long term situations as appropriate  Outcome: Adequate for Discharge

## 2024-07-09 NOTE — DISCHARGE INSTRUCTIONS
your medications from [facility] in Hospital Lobby   Crush or cut your pills and open capsules, mix with liquid to drink.  Take Tylenol every 8 hours around the clock, unless instructed otherwise  Take your omeprazole daily  It is important to stay hydrated and follow your discharge diet progression   Mild nausea is ok as long as you can drink fluids, sip very slowly and get up and walk during any periods of nausea  You may shower normally after 48 hours, but do not scrub incision sites, blot gently with clean towel to dry incisions  Take home medications as usual unless instructed otherwise while in hospital  Follow up with Dr. ALLI Shah and your PCP within the next week

## 2024-07-09 NOTE — PROGRESS NOTES
"Progress Note - Bariatric Surgery   Jazmine Tran 44 y.o. female MRN: [id number]  Unit/Bed#: E5 -01 Encounter: [id number]    Assessment:  Jazmine Tran is a 44 y.o. female with history of sleeve gastrectomy and hiatal hernia repair S/p robotic assisted recurrent paraesophageal hernia repair with mesh.  Doing well.      Plan:  Upper GI study this morning  If upper GI looks ok, will advance to bariatric clear liquid diet  Pain control p.o. meds  Encourage ambulation being out of bed  Incentive spirometer  Labs and vitals were reviewed and are stable  Okay for Lovenox for VTE prophylaxis  Pepcid for GI prophylaxis  Replace electrolytes as needed  Likely discharge today    Subjective/Objective   Chief Complaint: No complaints    Subjective: No acute overnight events, patient denies nausea or vomiting. Patient is ambulating without issue.  Patient is voiding.     Objective:     Vitals: Blood pressure 126/66, pulse (!) 51, temperature 98.3 °F (36.8 °C), resp. rate 16, height 5' 7\" (1.702 m), weight 82.3 kg (181 lb 7 oz), SpO2 97%.,Body mass index is 28.42 kg/m².      Intake/Output Summary (Last 24 hours) at 7/9/2024 0723  Last data filed at 7/9/2024 0625  Gross per 24 hour   Intake 2300 ml   Output 1150 ml   Net 1150 ml       Invasive Devices       Peripheral Intravenous Line  Duration             Peripheral IV 07/08/24 Left Forearm <1 day                    Physical Exam: /66   Pulse (!) 51   Temp 98.3 °F (36.8 °C)   Resp 16   Ht 5' 7\" (1.702 m)   Wt 82.3 kg (181 lb 7 oz)   LMP  (LMP Unknown)   SpO2 97%   BMI 28.42 kg/m²   General appearance: alert and oriented, in no acute distress  Head: Normocephalic, without obvious abnormality, atraumatic  Eyes: negative  Nose: no discharge  Lungs:  No apparent respiratory distress on room air  Abdomen:  Soft, mildly tender, nondistended, laparoscopic port sites clean dry and intact  Extremities: extremities normal, warm and well-perfused; no cyanosis, " clubbing, or edema  Skin: Skin color, texture, turgor normal. No rashes or lesions    Lab, Imaging and other studies: I have personally reviewed pertinent labs.  CBC:   Lab Results   Component Value Date    WBC 9.68 07/09/2024    HGB 12.2 07/09/2024    HCT 36.7 07/09/2024    MCV 88 07/09/2024     07/09/2024    RBC 4.18 07/09/2024    MCH 29.2 07/09/2024    MCHC 33.2 07/09/2024    RDW 13.7 07/09/2024    MPV 10.5 07/09/2024     CMP:   Lab Results   Component Value Date    SODIUM 136 07/09/2024     07/09/2024    CO2 23 07/09/2024    BUN 11 07/09/2024    CREATININE 0.77 07/09/2024    CALCIUM 8.6 07/09/2024    EGFR 93 07/09/2024     VTE Pharmacologic Prophylaxis: Enoxaparin (Lovenox)  VTE Mechanical Prophylaxis: sequential compression device    Kel Wade MD  Bariatric Surgery

## 2024-07-09 NOTE — DISCHARGE INSTR - AVS FIRST PAGE
Bariatric/Weight Loss Surgery  Hospital Discharge Instructions  ACTIVITY:  Progress as feels comfortable - a good rule is:  if you are doing something and it begins to hurt, stop doing the activity. Walk every hour while at home.  You may walk stairs if you do so slowly  You may shower 48 hours after surgery.  Do not scrub incision sites.  Blot gently with clean towel to dry incisions. (see #4 below)   Use your incentive spirometer 10 times per hour while awake for 1 week after surgery.  Do NOT drive for 48 hours after surgery. No driving 24 hours after taking certain prescription pain medications. Examples of such medication are Percocet, Darvocet, Oxycodone, Tylenol #3, and Tylenol with Codeine.     DIET  Hernia diet as instructed    MEDICATIONS:  The abdominal nerve block will wear off during the first 1-2 days that you are home, and you may become sore (especially over incision site/sites where abdominal wall is sutured). This may create a pulling sensation, especially while moving around, and will fade over time.  Continue to take your Tylenol and your pain medication as instructed.   Start vitamins and minerals one week after surgery or when you start stage 3/puree diet.   Anti-acid Medication as per prescription.  Other medications as indicated on the Physician Patient Discharge Instructions form given to you at the time of discharge.  Make sure that you are splitting your pill or tablet medications in halves or fourths or even crushing them before you take them. Capsules should be opened and mixed with water or jello. You need to do this for at least 4 weeks after surgery. Eventually you will be able to take your medications the regular way as they were prescribed.   DO NOT TAKE BIRTH CONTROL(BC) MEDICATIONS, INSERT BC VAGINAL RINGS, OR PLACE IUD OR ANY OTHER BC METHODS UNTIL 31 DAYS FROM DAY OF DISCHARGE FROM HOSPITAL. THIS PLACES YOU AT HIGH RISK FOR A POTENTIALLY LIFE THREATENING BLOOD CLOT. Remember to  always use barrier methods for birth control and speak to your GYN about using two forms of birth control to start 31 days after surgery. It is very important to avoid pregnancy until at least 18-24 months after surgery.     INCISION CARE  You may shower and get incisions wet 2 days after surgery. No soaking tub baths or swimming for 30 days after surgery. Keep abdominal area and incisions clean. Use soap and water to create a good lather and rinse off.  Do not scrub incisions.   If you have a drain, empty the drain as the nurses instructed.    FOLLOW-UP APPOINTMENT should be made for one week after discharge. Call surgeon’s office at [phone] to schedule an appointment.    CALL YOUR DOCTOR FOR:  pain not controlled by pain medications, a temperature greater than 101.5° F, any increase or change in drainage or redness from any incision, any vomiting or inability to keep liquids down, shortness of breath, shoulder pain, or bleeding

## 2024-07-09 NOTE — PLAN OF CARE
Problem: PAIN - ADULT  Goal: Verbalizes/displays adequate comfort level or baseline comfort level  Description: Interventions:  - Encourage patient to monitor pain and request assistance  - Assess pain using appropriate pain scale  - Administer analgesics based on type and severity of pain and evaluate response  - Implement non-pharmacological measures as appropriate and evaluate response  - Consider cultural and social influences on pain and pain management  - Notify physician/advanced practitioner if interventions unsuccessful or patient reports new pain  Outcome: Progressing     Problem: INFECTION - ADULT  Goal: Absence or prevention of progression during hospitalization  Description: INTERVENTIONS:  - Assess and monitor for signs and symptoms of infection  - Monitor lab/diagnostic results  - Monitor all insertion sites, i.e. indwelling lines, tubes, and drains  - Monitor endotracheal if appropriate and nasal secretions for changes in amount and color  - [place] appropriate cooling/warming therapies per order  - Administer medications as ordered  - Instruct and encourage patient and family to use good hand hygiene technique  - Identify and instruct in appropriate isolation precautions for identified infection/condition  Outcome: Progressing     Problem: DISCHARGE PLANNING  Goal: Discharge to home or other facility with appropriate resources  Description: INTERVENTIONS:  - Identify barriers to discharge w/patient and caregiver  - Arrange for needed discharge resources and transportation as appropriate  - Identify discharge learning needs (meds, wound care, etc.)  - Arrange for interpretive services to assist at discharge as needed  - Refer to Case Management Department for coordinating discharge planning if the patient needs post-hospital services based on physician/advanced practitioner order or complex needs related to functional status, cognitive ability, or social support system  Outcome: Progressing      Problem: Knowledge Deficit  Goal: Patient/family/caregiver demonstrates understanding of disease process, treatment plan, medications, and discharge instructions  Description: Complete learning assessment and assess knowledge base.  Interventions:  - Provide teaching at level of understanding  - Provide teaching via preferred learning methods  Outcome: Progressing     Problem: GASTROINTESTINAL - ADULT  Goal: Minimal or absence of nausea and/or vomiting  Description: INTERVENTIONS:  - Administer IV fluids if ordered to ensure adequate hydration  - Maintain NPO status until nausea and vomiting are resolved  - Nasogastric tube if ordered  - Administer ordered antiemetic medications as needed  - Provide nonpharmacologic comfort measures as appropriate  - Advance diet as tolerated, if ordered  - Consider nutrition services referral to assist patient with adequate nutrition and appropriate food choices  Outcome: Progressing  Goal: Maintains adequate nutritional intake  Description: INTERVENTIONS:  - Monitor percentage of each meal consumed  - Identify factors contributing to decreased intake, treat as appropriate  - Assist with meals as needed  - Monitor I&O, weight, and lab values if indicated  - Obtain nutrition services referral as needed  Outcome: Progressing     Problem: GENITOURINARY - ADULT  Goal: Absence of urinary retention  Description: INTERVENTIONS:  - Assess patient’s ability to void and empty bladder  - Monitor I/O  - Bladder scan as needed  - Discuss with physician/AP medications to alleviate retention as needed  - Discuss catheterization for long term situations as appropriate  Outcome: Progressing

## 2024-07-10 ENCOUNTER — TELEPHONE (OUTPATIENT)
Dept: BARIATRICS | Facility: CLINIC | Age: 44
End: 2024-07-10

## 2024-07-10 PROCEDURE — 88302 TISSUE EXAM BY PATHOLOGIST: CPT | Performed by: PATHOLOGY

## 2024-07-10 NOTE — TELEPHONE ENCOUNTER
Post op follow up phone call completed.  Pt is sipping liquids, using IS as instructed, reinforced importance of using IS to help prevent pneumonia. Ambulating about home without difficulty.  Minimal pain, not using oxycodone.  Reaffirmed examples of liquid diet over the next week.  Pt stated understanding about discharge instructions and medication adjustments.  Follow up appt with surgeon scheduled for next week.   Instructed to call with any additional questions or concerns.

## 2024-07-18 ENCOUNTER — OFFICE VISIT (OUTPATIENT)
Dept: BARIATRICS | Facility: CLINIC | Age: 44
End: 2024-07-18

## 2024-07-18 VITALS
DIASTOLIC BLOOD PRESSURE: 80 MMHG | TEMPERATURE: 98.6 F | SYSTOLIC BLOOD PRESSURE: 104 MMHG | WEIGHT: 168.5 LBS | HEART RATE: 93 BPM | BODY MASS INDEX: 26.45 KG/M2 | HEIGHT: 67 IN

## 2024-07-18 DIAGNOSIS — K44.9 HIATAL HERNIA: Primary | ICD-10-CM

## 2024-07-18 PROCEDURE — 99024 POSTOP FOLLOW-UP VISIT: CPT | Performed by: SURGERY

## 2024-07-18 NOTE — PROGRESS NOTES
POST OP UP VISIT - BARIATRIC SURGERY  Jazmine Tran 44 y.o. female MRN: [id number]  Unit/Bed#:  Encounter: [id number]      HPI:  Jazmine Tran is a 44 y.o. female status post Robotic repair of recurrent paraesophageal hernia with mesh performed by Dr. Dakota Shah on 7/8/2024 returning to office for first post op visit since surgery.    Tolerating soft diet.  Denies nausea and vomiting.  Taking multivitamins and PPI daily. Some itchiness around in the incisions.    Review of Systems   Constitutional:  Negative for chills and fever.   HENT:  Negative for ear pain and sore throat.    Eyes:  Negative for pain and visual disturbance.   Respiratory:  Negative for cough and shortness of breath.    Cardiovascular:  Negative for chest pain and palpitations.   Gastrointestinal:  Negative for abdominal pain and vomiting.   Genitourinary:  Negative for dysuria and hematuria.   Musculoskeletal:  Negative for arthralgias and back pain.   Skin:  Negative for color change and rash.   Neurological:  Negative for seizures and syncope.   All other systems reviewed and are negative.        Historical Information   Past Medical History:   Diagnosis Date    Arthritis     Right hip    Asthma     allergy induced    Environmental allergies     dust    GERD (gastroesophageal reflux disease)     Migraine with aura     Osteoarthritis     PONV (postoperative nausea and vomiting)     Seasonal allergies     Varicella 4 or 5 years old    Wears contact lenses     wears at night     Past Surgical History:   Procedure Laterality Date    ANKLE SURGERY Left     ANKLE STABALIZATION    BARIATRIC SURGERY  3/2/21    Gastric sleeve    CHOLECYSTECTOMY  2015    COLONOSCOPY      JOINT REPLACEMENT Bilateral 03/2013    Total left hip replacement    [place] ARTHRP ACETBLR/PROX FEM PROSTC AGRFT/ALGRFT Right 04/27/2022    Procedure: ARTHROPLASTY HIP TOTAL ANTERIOR;  Surgeon: Cindi Vences MD;  [facility];  Service: Orthopedics    [facility]A [facility]02/2021    Procedure: ROBOTIC SLEEVE GASTRECTOMY; ROBOTIC PARAESOPHAGEAL HERNIA REPAIR;  Surgeon: Blanca Shah MD;  [facility];  Service: Bariatrics    [place] LAPS RPR PARAESPHGL HRNA INCL FUNDPLSTY W/MESH N/A 7/8/2024    Procedure: ROBOTIC PEHR with Mesh;  Surgeon: Blanca Shah MD;  [facility];  Service: Bariatrics    TOOTH EXTRACTION      TOTAL HIP ARTHROPLASTY Left      Social History   Social History     Substance and Sexual Activity   Alcohol Use Yes    Comment: Socially     Social History     Substance and Sexual Activity   Drug Use No     Social History     Tobacco Use   Smoking Status Never   Smokeless Tobacco Never     Family History: non-contributory    Meds/Allergies   all medications and allergies reviewed  No Known Allergies    Objective       Current Vitals:   Blood Pressure: 104/80 (07/18/24 0853)  Pulse: 93 (07/18/24 0853)  Temperature: 98.6 °F (37 °C) (07/18/24 0853)  Temp Source: Tympanic (07/18/24 0853)  Height: 5' 7\" (170.2 cm) (07/18/24 0853)  Weight - Scale: 76.4 kg (168 lb 8 oz) (07/18/24 0853)      Invasive Devices       None                   Physical Exam  Vitals reviewed.   Constitutional:       General: She is not in acute distress.     Appearance: Normal appearance. She is not ill-appearing.   HENT:      Head: Normocephalic.      Nose: Nose normal.      Mouth/Throat:      Mouth: Mucous membranes are moist.      Pharynx: Oropharynx is clear.   Eyes:      General: No scleral icterus.  Cardiovascular:      Rate and Rhythm: Normal rate.   Pulmonary:      Effort: Pulmonary effort is normal. No respiratory distress.   Abdominal:      Palpations: Abdomen is soft.      Tenderness: There is no abdominal tenderness.      Hernia: No hernia is present.      Comments: Laparoscopic port sites clean dry and intact.  Light pink rash around the incisions.   Musculoskeletal:         General: Normal range of motion.      Cervical back: Normal range " "of motion.   Skin:     General: Skin is warm and dry.      Capillary Refill: Capillary refill takes less than 2 seconds.   Neurological:      General: No focal deficit present.      Mental Status: She is alert. Mental status is at baseline.   Psychiatric:         Mood and Affect: Mood normal.         Assessment/PLAN:    44 y.o. female status post Robotic repair of recurrent paraesophageal hernia with mesh performed by Dr. Dakota Shah on 7/8/2024, doing well post op. No major issues and healing well.     The pathology report was reviewed with the patient and the results were within normal limits.     Pathology, and Other Studies: I have personally reviewed pertinent reports.   and I have personally reviewed pertinent films in PACS  Lab Results   Component Value Date    FINALDX  07/08/2024     A. Abdomen, \"Hernia sac,\" hernia repair:  - Fragments of benign fibrous tissue and skeletal muscle            Increase physical activity slowly as tolerated and instructed.  Advance diet as instructed by our dietitians today and as indicated in the binder.  Continue PPI    Follow up in one month as scheduled.        Kel Wade MD  Bariatric Surgery   7/18/2024  9:01 AM    "

## 2024-07-18 NOTE — PROGRESS NOTES
Date of surgery:  7/2/2024  Procedure:  Sleeve  2021 , Sx PEHR  Performing surgeon:  Dr CARSON     Initial Weight - 256  Current Weight - 168.5  Total Body Weight Loss (EWL)- 87.5  EWL% - 90%  TWB % - 34%

## 2024-08-12 ENCOUNTER — OFFICE VISIT (OUTPATIENT)
Dept: FAMILY MEDICINE CLINIC | Facility: CLINIC | Age: 44
End: 2024-08-12
Payer: COMMERCIAL

## 2024-08-12 VITALS
TEMPERATURE: 98 F | HEART RATE: 58 BPM | SYSTOLIC BLOOD PRESSURE: 140 MMHG | DIASTOLIC BLOOD PRESSURE: 73 MMHG | WEIGHT: 173 LBS | BODY MASS INDEX: 26.22 KG/M2 | HEIGHT: 68 IN

## 2024-08-12 DIAGNOSIS — Z12.11 COLON CANCER SCREENING: ICD-10-CM

## 2024-08-12 DIAGNOSIS — E78.00 HYPERCHOLESTEREMIA: ICD-10-CM

## 2024-08-12 DIAGNOSIS — Z00.00 ANNUAL PHYSICAL EXAM: ICD-10-CM

## 2024-08-12 DIAGNOSIS — Z11.59 SCREENING FOR VIRAL DISEASE: Primary | ICD-10-CM

## 2024-08-12 PROCEDURE — 99396 PREV VISIT EST AGE 40-64: CPT | Performed by: FAMILY MEDICINE

## 2024-08-12 NOTE — PROGRESS NOTES
"Adult Annual Physical  Name: Jazmine Tran      : 1980      MRN: [id number]  Encounter Provider: Alvin Zhou DO  Encounter Date: 2024   Encounter department: [facility]    Assessment & Plan   1. Screening for viral disease  -     Hepatitis C antibody; Future  -     HIV 1/2 AG/AB w Reflex [facility] for 2 yr old and above; Future  2. Colon cancer screening  -     Cologuard  3. Hypercholesteremia  -     Lipid panel; Future; Expected date: 2024  4. Annual physical exam    Immunizations and preventive care screenings were discussed with patient today. Appropriate education was printed on patient's after visit summary.    Counseling:  Alcohol/drug use: discussed moderation in alcohol intake, the recommendations for healthy alcohol use, and avoidance of illicit drug use.  Dental Health: discussed importance of regular tooth brushing, flossing, and dental visits.  Injury prevention: discussed safety/seat belts, safety helmets, smoke detectors, carbon dioxide detectors, and smoking near bedding or upholstery.  Sexual health: discussed sexually transmitted diseases, partner selection, use of condoms, avoidance of unintended pregnancy, and contraceptive alternatives.  Exercise: the importance of regular exercise/physical activity was discussed. Recommend exercise 3-5 times per week for at least 30 minutes.          History of Present Illness     Adult Annual Physical  Review of Systems   Constitutional: Negative.    HENT: Negative.     Eyes: Negative.    Respiratory: Negative.     Cardiovascular: Negative.    Gastrointestinal: Negative.    Genitourinary: Negative.    Musculoskeletal: Negative.    Skin: Negative.    Neurological: Negative.    Psychiatric/Behavioral: Negative.           Objective     /73 (BP Location: Left arm, Patient Position: Sitting, Cuff Size: Standard)   Pulse 58   Temp 98 °F (36.7 °C) (Temporal)   Ht 5' 7.5\" (1.715 m)   Wt 78.5 kg (173 lb)   BMI 26.70 kg/m² "     Physical Exam  Constitutional:       Appearance: She is well-developed.   HENT:      Head: Normocephalic and atraumatic.      Right Ear: Tympanic membrane, ear canal and external ear normal.      Left Ear: Tympanic membrane, ear canal and external ear normal.      Nose: Nose normal.      Mouth/Throat:      Mouth: Mucous membranes are moist.   Eyes:      Conjunctiva/sclera: Conjunctivae normal.      Pupils: Pupils are equal, round, and reactive to light.   Cardiovascular:      Rate and Rhythm: Normal rate and regular rhythm.      Pulses: Normal pulses.      Heart sounds: Normal heart sounds.   Pulmonary:      Effort: Pulmonary effort is normal.      Breath sounds: Normal breath sounds.   Abdominal:      General: Bowel sounds are normal.      Palpations: Abdomen is soft.   Musculoskeletal:      Cervical back: Normal range of motion and neck supple.   Skin:     General: Skin is warm and dry.   Neurological:      Mental Status: She is alert and oriented to person, place, and time.      Deep Tendon Reflexes: Reflexes are normal and symmetric.   Psychiatric:         Behavior: Behavior normal.         Thought Content: Thought content normal.         Judgment: Judgment normal.

## 2024-08-12 NOTE — LETTER
August 13, 2024     Patient: Jazmine Tran  YOB: 1980  Date of Visit: 8/12/2024      To Whom it May Concern:    Jazmine Tran is under my professional care. Jazmine was seen in my office on 8/12/2024. Jazmine may return to work on 8/13/2024 .    If you have any questions or concerns, please don't hesitate to call.         Sincerely,          Alvin Zhou, DO

## 2024-08-13 ENCOUNTER — TELEPHONE (OUTPATIENT)
Age: 44
End: 2024-08-13
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
## 2024-08-17 ENCOUNTER — APPOINTMENT (OUTPATIENT)
Dept: LAB | Facility: CLINIC | Age: 44
End: 2024-08-17
Payer: COMMERCIAL

## 2024-08-17 DIAGNOSIS — E78.00 HYPERCHOLESTEREMIA: ICD-10-CM

## 2024-08-17 DIAGNOSIS — Z11.59 SCREENING FOR VIRAL DISEASE: ICD-10-CM

## 2024-08-17 LAB
CHOLEST SERPL-MCNC: 158 MG/DL
HCV AB SER QL: NORMAL
HDLC SERPL-MCNC: 79 MG/DL
HIV 1+2 AB+HIV1 P24 AG SERPL QL IA: NORMAL
HIV 2 AB SERPL QL IA: NORMAL
HIV1 AB SERPL QL IA: NORMAL
HIV1 P24 AG SERPL QL IA: NORMAL
LDLC SERPL CALC-MCNC: 67 MG/DL (ref 0–100)
NONHDLC SERPL-MCNC: 79 MG/DL
TRIGL SERPL-MCNC: 58 MG/DL

## 2024-08-17 PROCEDURE — 86803 HEPATITIS C AB TEST: CPT

## 2024-08-17 PROCEDURE — 80061 LIPID PANEL: CPT

## 2024-08-17 PROCEDURE — 87389 HIV-1 AG W/HIV-1&-2 AB AG IA: CPT

## 2024-08-17 PROCEDURE — 36415 COLL VENOUS BLD VENIPUNCTURE: CPT

## 2024-08-22 ENCOUNTER — OFFICE VISIT (OUTPATIENT)
Dept: BARIATRICS | Facility: CLINIC | Age: 44
End: 2024-08-22
Payer: COMMERCIAL

## 2024-08-22 VITALS
BODY MASS INDEX: 27.94 KG/M2 | SYSTOLIC BLOOD PRESSURE: 122 MMHG | WEIGHT: 178 LBS | OXYGEN SATURATION: 99 % | HEIGHT: 67 IN | DIASTOLIC BLOOD PRESSURE: 70 MMHG | HEART RATE: 75 BPM | TEMPERATURE: 97.8 F

## 2024-08-22 DIAGNOSIS — K44.9 HIATAL HERNIA: Primary | ICD-10-CM

## 2024-08-22 PROCEDURE — 99213 OFFICE O/P EST LOW 20 MIN: CPT | Performed by: SURGERY

## 2024-08-22 NOTE — PROGRESS NOTES
Date of surgery: 3/2/2021  /  7/8/2024  Procedure: sleeve/ PEHR  /  Foregut  Performing surgeon: Dr. Dakota Shah    Initial Weight - 256.0 lbs  Current Weight - 178.0 lbs  Total Body Weight Loss (EWL) - 78.0 lbs  EWL% - 81%  TWB% - 30%

## 2024-08-22 NOTE — PROGRESS NOTES
POST OP UP VISIT - BARIATRIC SURGERY  Jazmine Tran 44 y.o. female MRN: [id number]  Unit/Bed#:  Encounter: [id number]      HPI:  Jazmine Tran is a 44 y.o. female status post Robotic repair of recurrent paraesophageal hernia with mesh performed by Dr. Dakota Shah on 7/8/2024 returning to office for second post op visit since surgery.    Tolerating soft diet.  Denies nausea and vomiting.  Taking multivitamins and PPI daily. No heartburn sx since surgery.    Review of Systems   Constitutional:  Negative for chills and fever.   HENT:  Negative for ear pain and sore throat.    Eyes:  Negative for pain and visual disturbance.   Respiratory:  Negative for cough and shortness of breath.    Cardiovascular:  Negative for chest pain and palpitations.   Gastrointestinal:  Negative for abdominal pain and vomiting.   Genitourinary:  Negative for dysuria and hematuria.   Musculoskeletal:  Negative for arthralgias and back pain.   Skin:  Negative for color change and rash.   Neurological:  Negative for seizures and syncope.   All other systems reviewed and are negative.        Historical Information   Past Medical History:   Diagnosis Date    Arthritis     Right hip    Asthma     allergy induced    Environmental allergies     dust    GERD (gastroesophageal reflux disease)     Migraine with aura     Osteoarthritis     PONV (postoperative nausea and vomiting)     Seasonal allergies     Varicella 4 or 5 years old    Wears contact lenses     wears at night     Past Surgical History:   Procedure Laterality Date    ANKLE SURGERY Left     ANKLE STABALIZATION    BARIATRIC SURGERY  3/2/21    Gastric sleeve    CHOLECYSTECTOMY  2015    COLONOSCOPY      JOINT REPLACEMENT Bilateral 03/2013    Total left hip replacement    [place] ARTHRP ACETBLR/PROX FEM PROSTC AGRFT/ALGRFT Right 04/27/2022    Procedure: ARTHROPLASTY HIP TOTAL ANTERIOR;  Surgeon: Cindi Vences MD;  [facility];  Service: Orthopedics    [place] LAPS [facility] RSTRICTIV PX  "LONGITUDINAL GASTRECTOMY N/A 03/02/2021    Procedure: ROBOTIC SLEEVE GASTRECTOMY; ROBOTIC PARAESOPHAGEAL HERNIA REPAIR;  Surgeon: Blanca Shah MD;  [facility];  Service: Bariatrics    [place] LAPS RPR PARAESPHGL HRNA INCL FUNDPLSTY W/MESH N/A 7/8/2024    Procedure: ROBOTIC PEHR with Mesh;  Surgeon: Blanca Shah MD;  [facility];  Service: Bariatrics    TOOTH EXTRACTION      TOTAL HIP ARTHROPLASTY Left      Social History   Social History     Substance and Sexual Activity   Alcohol Use Yes    Comment: Extremely rare     Social History     Substance and Sexual Activity   Drug Use Never     Social History     Tobacco Use   Smoking Status Never   Smokeless Tobacco Never     Family History: non-contributory    Meds/Allergies   all medications and allergies reviewed  No Known Allergies    Objective       Current Vitals:   Blood Pressure: 122/70 (08/22/24 1610)  Pulse: 75 (08/22/24 1610)  Temperature: 97.8 °F (36.6 °C) (08/22/24 1610)  Temp Source: Tympanic (08/22/24 1610)  Height: 5' 7\" (170.2 cm) (08/22/24 1610)  Weight - Scale: 80.7 kg (178 lb) (08/22/24 1610)  SpO2: 99 % (08/22/24 1610)      Invasive Devices       None                   Physical Exam  Vitals reviewed.   Constitutional:       General: She is not in acute distress.     Appearance: Normal appearance. She is not ill-appearing.   HENT:      Head: Normocephalic.      Nose: Nose normal.      Mouth/Throat:      Mouth: Mucous membranes are moist.      Pharynx: Oropharynx is clear.   Eyes:      General: No scleral icterus.  Cardiovascular:      Rate and Rhythm: Normal rate.   Pulmonary:      Effort: Pulmonary effort is normal. No respiratory distress.   Abdominal:      Palpations: Abdomen is soft.      Tenderness: There is no abdominal tenderness.      Hernia: No hernia is present.      Comments: Laparoscopic port sites clean dry and intact.  Light pink rash around the incisions.   Musculoskeletal:         General: Normal range of motion.     " " Cervical back: Normal range of motion.   Skin:     General: Skin is warm and dry.      Capillary Refill: Capillary refill takes less than 2 seconds.   Neurological:      General: No focal deficit present.      Mental Status: She is alert. Mental status is at baseline.   Psychiatric:         Mood and Affect: Mood normal.         Assessment/PLAN:    44 y.o. female status post Robotic repair of recurrent paraesophageal hernia with mesh performed by Dr. Dakota Shah on 7/8/2024, doing well post op. No major issues and healing well.     The pathology report was reviewed with the patient and the results were within normal limits.     Pathology, and Other Studies: I have personally reviewed pertinent reports.   and I have personally reviewed pertinent films in PACS  Lab Results   Component Value Date    FINALDX  07/08/2024     A. Abdomen, \"Hernia sac,\" hernia repair:  - Fragments of benign fibrous tissue and skeletal muscle          Increase physical activity slowly as tolerated and instructed.  Advance diet as instructed by our dietitians today and as indicated in the binder.  Start weaning off PPI, patient to take 20 mg every other day for 2 weeks and then stop.    Follow up in 6 months as scheduled.        Jorge Anthony MD  Bariatric Surgery   8/22/2024  9:14 PM    "

## 2024-09-14 LAB — COLOGUARD RESULT REPORTABLE: NEGATIVE

## 2024-10-07 ENCOUNTER — HOSPITAL ENCOUNTER (OUTPATIENT)
Dept: MAMMOGRAPHY | Facility: MEDICAL CENTER | Age: 44
Discharge: HOME | End: 2024-10-07
Payer: COMMERCIAL

## 2024-10-07 VITALS — HEIGHT: 67 IN | BODY MASS INDEX: 27.94 KG/M2 | WEIGHT: 178 LBS

## 2024-10-07 DIAGNOSIS — Z12.31 ENCOUNTER FOR SCREENING MAMMOGRAM FOR MALIGNANT NEOPLASM OF BREAST: ICD-10-CM

## 2024-10-07 PROCEDURE — 77067 SCR MAMMO BI INCL CAD: CPT

## 2024-10-07 PROCEDURE — 77063 BREAST TOMOSYNTHESIS BI: CPT

## 2024-11-18 ENCOUNTER — APPOINTMENT (OUTPATIENT)
Dept: RADIOLOGY | Age: 44
End: 2024-11-18
Payer: COMMERCIAL

## 2024-11-18 ENCOUNTER — OFFICE VISIT (OUTPATIENT)
Dept: URGENT CARE | Age: 44
End: 2024-11-18
Payer: COMMERCIAL

## 2024-11-18 ENCOUNTER — OFFICE VISIT (OUTPATIENT)
Dept: OBGYN CLINIC | Facility: OTHER | Age: 44
End: 2024-11-18
Payer: COMMERCIAL

## 2024-11-18 VITALS
BODY MASS INDEX: 28.28 KG/M2 | SYSTOLIC BLOOD PRESSURE: 143 MMHG | OXYGEN SATURATION: 99 % | TEMPERATURE: 97.9 F | HEIGHT: 67 IN | RESPIRATION RATE: 18 BRPM | HEART RATE: 72 BPM | WEIGHT: 180.2 LBS | DIASTOLIC BLOOD PRESSURE: 74 MMHG

## 2024-11-18 VITALS
WEIGHT: 180 LBS | HEIGHT: 67 IN | HEART RATE: 65 BPM | DIASTOLIC BLOOD PRESSURE: 75 MMHG | SYSTOLIC BLOOD PRESSURE: 122 MMHG | BODY MASS INDEX: 28.25 KG/M2

## 2024-11-18 DIAGNOSIS — M75.42 SUBACROMIAL IMPINGEMENT OF LEFT SHOULDER: Primary | ICD-10-CM

## 2024-11-18 DIAGNOSIS — M75.32 CALCIFIC TENDINITIS OF LEFT SHOULDER: ICD-10-CM

## 2024-11-18 DIAGNOSIS — S43.402A SPRAIN OF LEFT SHOULDER, UNSPECIFIED SHOULDER SPRAIN TYPE, INITIAL ENCOUNTER: Primary | ICD-10-CM

## 2024-11-18 DIAGNOSIS — S43.402A SPRAIN OF LEFT SHOULDER, UNSPECIFIED SHOULDER SPRAIN TYPE, INITIAL ENCOUNTER: ICD-10-CM

## 2024-11-18 DIAGNOSIS — M89.9 LESION OF LEFT HUMERUS: ICD-10-CM

## 2024-11-18 DIAGNOSIS — M77.9 TENDONITIS: ICD-10-CM

## 2024-11-18 PROCEDURE — S9083 URGENT CARE CENTER GLOBAL: HCPCS | Performed by: PHYSICIAN ASSISTANT

## 2024-11-18 PROCEDURE — G0382 LEV 3 HOSP TYPE B ED VISIT: HCPCS | Performed by: PHYSICIAN ASSISTANT

## 2024-11-18 PROCEDURE — 73030 X-RAY EXAM OF SHOULDER: CPT

## 2024-11-18 PROCEDURE — 99214 OFFICE O/P EST MOD 30 MIN: CPT | Performed by: ORTHOPAEDIC SURGERY

## 2024-11-18 RX ORDER — PREDNISONE 10 MG/1
TABLET ORAL
Qty: 12 TABLET | Refills: 0 | Status: SHIPPED | OUTPATIENT
Start: 2024-11-18

## 2024-11-18 NOTE — PROGRESS NOTES
Assessment  Diagnoses and all orders for this visit:    Subacromial impingement of left shoulder  -     Ambulatory referral to Orthopedic Surgery    Lesion of left humerus    Calcific tendinitis of left shoulder        Discussion and Plan:    The patient has an examination consistent with subacromial impingement syndrome of the left shoulder.  I have discussed with the patient the pathophysiology of this diagnosis and reviewed how the examination correlates with this diagnosis.  Treatment options were discussed at length and after discussing these treatment options, the patient elected for and received a subacromial injection of corticosteroid (as described in the procedure note) with a prescription for referral to physical therapy.    We will reevaluate the patient in 6-8 weeks.  If the symptoms fail to improve with this treatment the patient would be indicated for further imaging in the form of an MRI scan of the shoulder and the majority of the humerus shaft to better evaluate the lesion seen on plain x-ray.  Given its stable appearance and benign appearance this is not indicated at this time but certainly if we needed to obtain further imaging of the shoulder we would include the humerus  Will obtain a repeat x rays at her next visit to establish stability and a benign nature of the lucency, if she is improving, just to further evaluate the x ray finding of the benigin appearing lucency within the humeral diaphysis.  The calcific tendinitis in her shoulder does appear to be the undersurface of the deltoid but could presumably be some calcific bursitis floating around in the shoulder subacromial space, of course further imaging in the form of an MRI would help us understand this better if necessary.    Subjective:   Patient ID: Jazmine Tran is a 44 y.o. female presents with a chief complaint of left shoulder pain.   The pain began 1 day(s) ago and is not associated with an acute injury. The patient  "describes the pain as aching and dull in intensity,  intermittent in timing, and localizes the pain to the  left subacromial joint, deltoid.  The pain is worse with overhead work, overuse, and raising arm over head and relieved by rest, ice, avoiding the painful activities.  The pain is not associated with numbness and tingling.  The pain is not associated with constitutional symptoms. The patient is awoken at night by the pain.    The patient has had no prior treatment.        The following portions of the patient's history were reviewed and updated as appropriate: allergies, current medications, past family history, past medical history, past social history, past surgical history and problem list.    Objective:  /75   Pulse 65   Ht 5' 7\" (1.702 m)   Wt 81.6 kg (180 lb)   BMI 28.19 kg/m²       Left Shoulder Exam     Range of Motion   The patient has normal left shoulder ROM.    Muscle Strength   Abduction: 4/5   External rotation: 5/5     Tests   Garcia test: positive  Impingement: positive  Drop arm: negative    Other   Erythema: absent  Sensation: normal  Pulse: present             Physical Exam  Constitutional:       Appearance: She is well-developed.   Eyes:      Pupils: Pupils are equal, round, and reactive to light.   Pulmonary:      Effort: Pulmonary effort is normal.      Breath sounds: Normal breath sounds.   Skin:     General: Skin is warm and dry.   Neurological:      Mental Status: She is alert and oriented to person, place, and time.   Psychiatric:         Behavior: Behavior normal.         Thought Content: Thought content normal.         Judgment: Judgment normal.           I have personally reviewed pertinent films in PACS and my interpretation is as follows.    X Ray Left Shoulder 11/18/24: No acute osseous abnormalities or degenerative changes. Indeterminate, benign appearing lucency in the humeral diaphysis.       Records Reviewed: office notes from Urgent Care on 11/17/24    Scribe " Attestation      I,:  Pablo Rhoades am acting as a scribe while in the presence of the attending physician.:       I,:  Kiko Salcido MD personally performed the services described in this documentation    as scribed in my presence.:

## 2024-11-18 NOTE — PROGRESS NOTES
"[facility]        NAME: Jazmine Tran is a 44 y.o. female  : 1980    MRN: [id number]  DATE: 2024  TIME: 9:59 AM    /74 (BP Location: Right arm, Cuff Size: Standard)   Pulse 72   Temp 97.9 °F (36.6 °C)   Resp 18   Ht 5' 7\" (1.702 m)   Wt 81.7 kg (180 lb 3.2 oz)   SpO2 99%   BMI 28.22 kg/m²     Assessment and Plan   Sprain of left shoulder, unspecified shoulder sprain type, initial encounter [S43.402A]  1. Sprain of left shoulder, unspecified shoulder sprain type, initial encounter  XR shoulder 2+ vw left    predniSONE 10 mg tablet    Ambulatory referral to Orthopedic Surgery      2. Tendonitis  predniSONE 10 mg tablet    Ambulatory referral to Orthopedic Surgery            Patient Instructions       Follow up with PCP in 3-5 days.  Proceed to  ER if symptoms worsen.    Chief Complaint     Chief Complaint   Patient presents with    Pain     Shoulder pain onset was . Around 6 pm patient struggled to move her arm. She applied heat and felt no improvement. Patient has not taken any medication. Patient does not know what event caused the pain and has never had this pain prior.         History of Present Illness       Pt with several days of left shoulder pain, no known trauma,  pain worse with movement         Review of Systems   Review of Systems   Constitutional: Negative.    HENT: Negative.     Eyes: Negative.    Respiratory: Negative.     Cardiovascular: Negative.    Gastrointestinal: Negative.    Endocrine: Negative.    Genitourinary: Negative.    Musculoskeletal: Negative.    Skin: Negative.    Allergic/Immunologic: Negative.    Neurological: Negative.    Hematological: Negative.    Psychiatric/Behavioral: Negative.     All other systems reviewed and are negative.        Current Medications       Current Outpatient Medications:     predniSONE 10 mg tablet, 3 tabs po qd x 2 days then 2 tabs po qd x 2 days then 1 tab po qd x 2 days, Disp: 12 tablet, Rfl: 0    ascorbic " acid (VITAMIN C) 500 MG tablet, Take 1 tablet (500 mg total) by mouth 2 (two) times a day, Disp: 60 tablet, Rfl: 0    CALCIUM PO, Take 2 tablets by mouth 2 (two) times a day, Disp: , Rfl:     Cetirizine HCl (ZYRTEC PO), Take 10 mg by mouth daily , Disp: , Rfl:     fluocinolone (SYNALAR) 0.025 % ointment, daily at bedtime Around the mouth, Disp: , Rfl: 2    Multiple Vitamins-Minerals (multivitamin with minerals) tablet, Take 1 tablet by mouth daily, Disp: 30 tablet, Rfl: 0    norethindrone (Dee) 0.35 MG tablet, Take 1 tablet (0.35 mg total) by mouth daily, Disp: 90 tablet, Rfl: 2    omeprazole (PriLOSEC) 20 mg delayed release capsule, Take 1 capsule (20 mg total) by mouth daily, Disp: 90 capsule, Rfl: 1    Current Allergies     Allergies as of 11/18/2024    (No Known Allergies)            The following portions of the patient's history were reviewed and updated as appropriate: allergies, current medications, past family history, past medical history, past social history, past surgical history and problem list.     Past Medical History:   Diagnosis Date    Arthritis     Right hip    Asthma     allergy induced    Environmental allergies     dust    GERD (gastroesophageal reflux disease)     Migraine with aura     Osteoarthritis     PONV (postoperative nausea and vomiting)     Seasonal allergies     Varicella 4 or 5 years old    Wears contact lenses     wears at night       Past Surgical History:   Procedure Laterality Date    ANKLE SURGERY Left     ANKLE STABALIZATION    BARIATRIC SURGERY  3/2/21    Gastric sleeve    CHOLECYSTECTOMY  2015    COLONOSCOPY      JOINT REPLACEMENT Bilateral 03/2013    Total left hip replacement    FL ARTHRP ACETBLR/PROX FEM PROSTC AGRFT/ALGRFT Right 04/27/2022    Procedure: ARTHROPLASTY HIP TOTAL ANTERIOR;  Surgeon: Cindi Vences MD;  [facility]: [facility];  Service: Orthopedics    [facility]/A 03/02/2021    Procedure: ROBOTIC SLEEVE  "GASTRECTOMY; ROBOTIC PARAESOPHAGEAL HERNIA REPAIR;  Surgeon: Blanca Shah MD;  Location: [facility];  Service: Bariatrics    ME LAPS RPR PARAESPHGL HRNA INCL FUNDPLSTY W/MESH N/A 7/8/2024    Procedure: ROBOTIC PEHR with Mesh;  Surgeon: Blanca Shah MD;  Location: [facility];  Service: Bariatrics    TOOTH EXTRACTION      TOTAL HIP ARTHROPLASTY Left        Family History   Problem Relation Age of Onset    Arthritis Mother     Hypertension Mother     Skin cancer Mother     Cancer Father         Prostate    Diabetes Father         MELLITUS    Hypertension Father     Anemia Sister     Celiac disease Sister     Thyroid disease Sister     Varicose Veins Sister         LOWER EXTREMITIES    Breast cancer Sister 44    Cancer Sister         Breast cancer    Liver cancer Maternal Grandmother 80    Diabetes Maternal Grandmother     Cancer Maternal Grandmother         Liver    No Known Problems Maternal Grandfather     Diabetes Paternal Grandmother         MELLIYUS    Dementia Paternal Grandmother     Diabetes Paternal Grandfather         MELLITUS    Stroke Paternal Grandfather     Stroke Paternal Uncle     Cancer Paternal Uncle 73        bladder    Heart disease Neg Hx          Medications have been verified.        Objective   /74 (BP Location: Right arm, Cuff Size: Standard)   Pulse 72   Temp 97.9 °F (36.6 °C)   Resp 18   Ht 5' 7\" (1.702 m)   Wt 81.7 kg (180 lb 3.2 oz)   SpO2 99%   BMI 28.22 kg/m²        Physical Exam     Physical Exam  Vitals and nursing note reviewed.   Constitutional:       Appearance: Normal appearance. She is normal weight.   HENT:      Head: Normocephalic and atraumatic.   Cardiovascular:      Rate and Rhythm: Normal rate and regular rhythm.      Pulses: Normal pulses.      Heart sounds: Normal heart sounds.   Pulmonary:      Effort: Pulmonary effort is normal.      Breath sounds: Normal breath sounds.   Abdominal:      General: Bowel sounds are normal.      Palpations: Abdomen is " soft.   Musculoskeletal:         General: Normal range of motion.      Cervical back: Normal range of motion and neck supple.      Comments: Left shoulder no clavicle tenderness no ac joint pain ,   anterior humeral head tenderness  no biceps or triceps pain  distal neuro and vascular wnl    Skin:     General: Skin is warm.   Neurological:      Mental Status: She is alert and oriented to person, place, and time.   Psychiatric:         Behavior: Behavior normal.

## 2024-11-18 NOTE — LETTER
November 18, 2024     Patient: Jazmine Tran   YOB: 1980   Date of Visit: 11/18/2024       To Whom It May Concern:    It is my medical opinion that Jazmine Tran may return to work on 11/19/2024 .    If you have any questions or concerns, please don't hesitate to call.         Sincerely,        Miguel Thomas Jr, PAGIUSEPPE    CC: No Recipients

## 2025-01-06 ENCOUNTER — OFFICE VISIT (OUTPATIENT)
Dept: OBGYN CLINIC | Facility: OTHER | Age: 45
End: 2025-01-06
Payer: COMMERCIAL

## 2025-01-06 VITALS — HEIGHT: 67 IN | BODY MASS INDEX: 28.25 KG/M2 | WEIGHT: 180 LBS

## 2025-01-06 DIAGNOSIS — M75.42 SUBACROMIAL IMPINGEMENT OF LEFT SHOULDER: Primary | ICD-10-CM

## 2025-01-06 DIAGNOSIS — M89.9 LESION OF LEFT HUMERUS: ICD-10-CM

## 2025-01-06 PROCEDURE — 99213 OFFICE O/P EST LOW 20 MIN: CPT | Performed by: ORTHOPAEDIC SURGERY

## 2025-01-06 NOTE — PROGRESS NOTES
"I Kiko Salcido MD personally examined the patient and reviewed the history provided.  I agree with the note and the assessment and plan by  Meghan Rios PA-C              Assessment  Diagnoses and all orders for this visit:    Subacromial impingement of left shoulder  -     Ambulatory Referral to Physical Therapy; Future    Lesion of left humerus  -     XR humerus left; Future        Discussion and Plan:    The patient is doing very well today.  Her pain has improved and her ROM is excellent.    She may continue with activities as tolerated.  A PT referral was placed for the patient today.  She will wait to start this if she has increased symptoms.  She will require new left humerus XR in May to re-evaluate the incidental finding of her humeral lesion.  This was ordered for her today.    PRN follow-up.    Subjective:   Patient ID: Jazmine Tran is a 44 y.o. female presents for a follow-up appointment for left shoulder pain.  The pain began around 11/17 without an acute injury.  At her last visit on 11/18/24, the patient underwent subacromial CSI.  This worked extremely well for her for her.  However, she states that around 12/12/24, she started to notice \"clicking\" sensations that she had previously associated with frozen shoulder.  She has not been taking pain medication for the shoulder.  She has not yet done any PT.      The following portions of the patient's history were reviewed and updated as appropriate: allergies, current medications, past family history, past medical history, past social history, past surgical history and problem list.    Objective:  Ht 5' 7\" (1.702 m)   Wt 81.6 kg (180 lb) Comment: verbal  BMI 28.19 kg/m²       Left Shoulder Exam     Range of Motion   The patient has normal left shoulder ROM.    Muscle Strength   Abduction: 5/5   External rotation: 5/5     Tests   Garcia test: negative  Impingement: negative  Drop arm: negative    Other   Erythema: absent  Sensation: " normal  Pulse: present             Physical Exam  Constitutional:       Appearance: She is well-developed.   Eyes:      Pupils: Pupils are equal, round, and reactive to light.   Pulmonary:      Effort: Pulmonary effort is normal.      Breath sounds: Normal breath sounds.   Skin:     General: Skin is warm and dry.   Neurological:      Mental Status: She is alert and oriented to person, place, and time.   Psychiatric:         Behavior: Behavior normal.         Thought Content: Thought content normal.         Judgment: Judgment normal.           No new X-rays today.       Scribe Attestation      I,:  Meghan Rios PA-C am acting as a scribe while in the presence of the attending physician.:       I,:  Kiko Salcido MD personally performed the services described in this documentation    as scribed in my presence.:

## 2025-01-07 ENCOUNTER — TELEPHONE (OUTPATIENT)
Dept: OBGYN CLINIC | Facility: HOSPITAL | Age: 45
End: 2025-01-07

## 2025-01-07 ENCOUNTER — TELEPHONE (OUTPATIENT)
Dept: OBGYN CLINIC | Facility: CLINIC | Age: 45
End: 2025-01-07

## 2025-01-07 DIAGNOSIS — Z30.41 ENCOUNTER FOR SURVEILLANCE OF CONTRACEPTIVE PILLS: ICD-10-CM

## 2025-01-07 NOTE — TELEPHONE ENCOUNTER
Caller: Patient    Doctor: Loly    Reason for call: Patient was in to see you yesterday and she needs a note just stating that she was there. Please fax to her at [phone]. Thank you.    Call back#: [phone]

## 2025-01-08 RX ORDER — ACETAMINOPHEN AND CODEINE PHOSPHATE 120; 12 MG/5ML; MG/5ML
1 SOLUTION ORAL DAILY
Qty: 90 TABLET | Refills: 1 | Status: SHIPPED | OUTPATIENT
Start: 2025-01-08

## 2025-01-08 NOTE — TELEPHONE ENCOUNTER
Note created and faxed to [phone]. Confirmation received by fax. Spoke to patient letting her know this was done.

## 2025-02-24 ENCOUNTER — OFFICE VISIT (OUTPATIENT)
Dept: BARIATRICS | Facility: CLINIC | Age: 45
End: 2025-02-24
Payer: COMMERCIAL

## 2025-02-24 VITALS
OXYGEN SATURATION: 99 % | WEIGHT: 178 LBS | HEART RATE: 74 BPM | BODY MASS INDEX: 27.94 KG/M2 | SYSTOLIC BLOOD PRESSURE: 116 MMHG | TEMPERATURE: 98.7 F | DIASTOLIC BLOOD PRESSURE: 78 MMHG | HEIGHT: 67 IN

## 2025-02-24 DIAGNOSIS — Z48.815 ENCOUNTER FOR SURGICAL AFTERCARE FOLLOWING SURGERY OF DIGESTIVE SYSTEM: Primary | ICD-10-CM

## 2025-02-24 DIAGNOSIS — K21.9 GERD (GASTROESOPHAGEAL REFLUX DISEASE): ICD-10-CM

## 2025-02-24 DIAGNOSIS — K91.2 POSTSURGICAL MALABSORPTION: ICD-10-CM

## 2025-02-24 DIAGNOSIS — Z98.84 BARIATRIC SURGERY STATUS: ICD-10-CM

## 2025-02-24 PROCEDURE — 99214 OFFICE O/P EST MOD 30 MIN: CPT | Performed by: NURSE PRACTITIONER

## 2025-02-24 NOTE — PROGRESS NOTES
Date of surgery: 3/21/2021  Procedure: Sleeve   Performing surgeon: Dr. Dakota Shah     Initial Weight - 256.0 lb   Current Weight - 178.0 lb   Rohith Weight - 168.5 lb   Total Body Weight Loss (EWL)-  78.0  EWL% - 81%  TWB % - 30%

## 2025-02-24 NOTE — PROGRESS NOTES
Assessment/Plan:     Patient ID: Jazmine Tran is a 44 y.o. female.     Bariatric Surgery Status/BMI 27/GERD    -s/p Vertical Sleeve Gastrectomyy and hiatal hernia repair without mesh by Dr. Guallpa on 3/2/2021 and recurrent PEHR with mesh with Dr. Dakota Shah on 07/08/2024. Presents to the office today for 6 MONTH post op for PEHR and surgical annual. Overall doing well, has been able to come off her omeprazole entirely. Noticed increase in heartburn when she eats greasy foods. If eats too fast or doesn't chew food thoroughly may vomit. Denies having any abdominal pain, N/V/C/D, or dysphagia.     PLAN:     - can take pepcid or tums as needed. Recommended to f/u in 6 months for annual visit for PEHR.   - Routine follow up in 1 year for surgical annual.   - Continue with healthy lifestyle, adequate protein intake of 60 gm, fluid intake of at least 64 oz.   - Continue with MVI daily.   - Activity as tolerated.   - Labs ordered and will adjust accordingly if any deficiency.   - Follow up with RD and SW as needed.       Continued/Maintain healthy weight loss with good nutrition intakes.  Adequate hydration with at least 64oz. fluid intake.  Follow diet as discussed.  Follow vitamin and mineral recommendations as reviewed with you.  Exercise as tolerated.    Colonoscopy referral made: UTD - duein 2027  Mammogram - UTD  Egd screening - utd    Follow-up in 6 months for PEHR annual visit. We kindly ask that your arrive 15 minutes before your scheduled appointment time with your provider to allow our staff to room you, get your vital signs and update your chart.    Get lab work done prior to visit. Please call the office if you need a script.  It is recommended to check with your insurance BEFORE getting labs done to make sure they are covered by your policy.      Call our office if you have any problems with abdominal pain especially associated with fever, chills, nausea, vomiting or any other concerns.    All   Post-bariatric surgery patients should be aware that very small quantities of any alcohol can cause impairment and it is very possible not to feel the effect. The effect can be in the system for several hours.  It is also a stomach irritant.     It is advised to AVOID alcohol, Nonsteroidal antiinflammatory drugs (NSAIDS) and nicotine of all forms . Any of these can cause stomach irritation/pain.    Discussed the effects of alcohol on a bariatric patient and the increased impairment risk.     Keep up the good work!     Postsurgical Malabsorption   -At risk for malabsorption of vitamins/minerals secondary to malabsorption and restriction of intake from bariatric surgery  -Currently taking adequate postop bariatric surgery vitamin supplementation  -Next set of bariatric labs ordered for approximately 2 weeks  -Patient received education about the importance of adhering to a lifelong supplementation regimen to avoid vitamin/mineral deficiencies      Diagnoses and all orders for this visit:    Encounter for surgical aftercare following surgery of digestive system  -     CBC; Future  -     Comprehensive metabolic panel; Future  -     Folate; Future  -     Iron Panel (Includes Ferritin, Iron Sat%, Iron, and TIBC); Future  -     PTH, intact; Future  -     Vitamin A; Future  -     Vitamin B1, whole blood; Future  -     Vitamin B12; Future  -     Vitamin D 25 hydroxy; Future  -     Zinc; Future    Bariatric surgery status  -     CBC; Future  -     Comprehensive metabolic panel; Future  -     Folate; Future  -     Iron Panel (Includes Ferritin, Iron Sat%, Iron, and TIBC); Future  -     PTH, intact; Future  -     Vitamin A; Future  -     Vitamin B1, whole blood; Future  -     Vitamin B12; Future  -     Vitamin D 25 hydroxy; Future  -     Zinc; Future    Postsurgical malabsorption  -     CBC; Future  -     Comprehensive metabolic panel; Future  -     Folate; Future  -     Iron Panel (Includes Ferritin, Iron Sat%, Iron, and  TIBC); Future  -     PTH, intact; Future  -     Vitamin A; Future  -     Vitamin B1, whole blood; Future  -     Vitamin B12; Future  -     Vitamin D 25 hydroxy; Future  -     Zinc; Future    BMI 27.0-27.9,adult  -     CBC; Future  -     Comprehensive metabolic panel; Future  -     Folate; Future  -     Iron Panel (Includes Ferritin, Iron Sat%, Iron, and TIBC); Future  -     PTH, intact; Future  -     Vitamin A; Future  -     Vitamin B1, whole blood; Future  -     Vitamin B12; Future  -     Vitamin D 25 hydroxy; Future  -     Zinc; Future    GERD (gastroesophageal reflux disease)  -     CBC; Future  -     Comprehensive metabolic panel; Future  -     Folate; Future  -     Iron Panel (Includes Ferritin, Iron Sat%, Iron, and TIBC); Future  -     PTH, intact; Future  -     Vitamin A; Future  -     Vitamin B1, whole blood; Future  -     Vitamin B12; Future  -     Vitamin D 25 hydroxy; Future  -     Zinc; Future         Subjective:      Patient ID: Jazmine Tran is a 44 y.o. female.    -s/p Vertical Sleeve Gastrectomyy and hiatal hernia repair without mesh by Dr. Guallpa on 3/2/2021 and recurrent PEHR with mesh with Dr. Dakota Shah on 07/08/2024. Presents to the office today for 6 MONTH post op for PEHR and surgical annual. Overall doing well, has been able to come off her omeprazole entirely. Noticed increase in heartburn when she eats greasy foods. If eats too fast or doesn't chew food thoroughly may vomit. Denies having any abdominal pain, N/V/C/D, or dysphagia.     Initial: 256 lbs   Current: 178 lbs   EWL: (Weight loss is ahead of schedule at this post surgical period.)  Rohith: 168 lbs   Current BMI is Body mass index is 27.88 kg/m².    Tolerating a regular diet-yes  Eating at least 60 grams of protein per day-yes  Following 30/60 minute rule with liquids-yes  Drinking at least 64 ounces of fluid per day-yes  Drinking carbonated beverages- YES - seltzer water.   Sufficient exercise-yes - daily   Using NSAIDs  "regularly-no  Using nicotine-no  Using alcohol- seldom/social.  Supplements: Multivitamins, Calcium , and Vitron-C    EWL is 81%, which places the patient ahead of schedule for expected post surgical weight loss at this time.     The following portions of the patient's history were reviewed and updated as appropriate: allergies, current medications, past family history, past medical history, past social history, past surgical history and problem list.    Review of Systems   Constitutional: Negative.    Respiratory: Negative.     Cardiovascular: Negative.    Gastrointestinal:         Occasional heartburn/regurgitation     Musculoskeletal: Negative.    Neurological: Negative.    Psychiatric/Behavioral: Negative.           Objective:    /78 (BP Location: Left arm, Patient Position: Sitting, Cuff Size: Adult)   Pulse 74   Temp 98.7 °F (37.1 °C) (Tympanic)   Ht 5' 7\" (1.702 m)   Wt 80.7 kg (178 lb)   SpO2 99%   BMI 27.88 kg/m²      Physical Exam  Vitals and nursing note reviewed.   Constitutional:       Appearance: Normal appearance.   Cardiovascular:      Rate and Rhythm: Normal rate and regular rhythm.      Pulses: Normal pulses.      Heart sounds: Normal heart sounds.   Pulmonary:      Effort: Pulmonary effort is normal.      Breath sounds: Normal breath sounds.   Abdominal:      General: Bowel sounds are normal.      Palpations: Abdomen is soft.      Tenderness: There is no abdominal tenderness.   Musculoskeletal:         General: Normal range of motion.   Skin:     General: Skin is warm and dry.   Neurological:      General: No focal deficit present.      Mental Status: She is alert and oriented to person, place, and time.   Psychiatric:         Mood and Affect: Mood normal.         Behavior: Behavior normal.         Thought Content: Thought content normal.         Judgment: Judgment normal.             "

## 2025-03-08 ENCOUNTER — APPOINTMENT (OUTPATIENT)
Dept: LAB | Facility: CLINIC | Age: 45
End: 2025-03-08
Payer: COMMERCIAL

## 2025-03-08 DIAGNOSIS — Z48.815 ENCOUNTER FOR SURGICAL AFTERCARE FOLLOWING SURGERY OF DIGESTIVE SYSTEM: ICD-10-CM

## 2025-03-08 DIAGNOSIS — Z98.84 BARIATRIC SURGERY STATUS: ICD-10-CM

## 2025-03-08 DIAGNOSIS — K91.2 POSTSURGICAL MALABSORPTION: ICD-10-CM

## 2025-03-08 DIAGNOSIS — K21.9 GERD (GASTROESOPHAGEAL REFLUX DISEASE): ICD-10-CM

## 2025-03-08 LAB
25(OH)D3 SERPL-MCNC: 68.4 NG/ML (ref 30–100)
ALBUMIN SERPL BCG-MCNC: 4 G/DL (ref 3.5–5)
ALP SERPL-CCNC: 40 U/L (ref 34–104)
ALT SERPL W P-5'-P-CCNC: 16 U/L (ref 7–52)
ANION GAP SERPL CALCULATED.3IONS-SCNC: 4 MMOL/L (ref 4–13)
AST SERPL W P-5'-P-CCNC: 18 U/L (ref 13–39)
BILIRUB SERPL-MCNC: 0.75 MG/DL (ref 0.2–1)
BUN SERPL-MCNC: 18 MG/DL (ref 5–25)
CALCIUM SERPL-MCNC: 9.1 MG/DL (ref 8.4–10.2)
CHLORIDE SERPL-SCNC: 107 MMOL/L (ref 96–108)
CO2 SERPL-SCNC: 30 MMOL/L (ref 21–32)
CREAT SERPL-MCNC: 0.92 MG/DL (ref 0.6–1.3)
ERYTHROCYTE [DISTWIDTH] IN BLOOD BY AUTOMATED COUNT: 13.8 % (ref 11.6–15.1)
FERRITIN SERPL-MCNC: 28 NG/ML (ref 11–307)
FOLATE SERPL-MCNC: >22.3 NG/ML
GFR SERPL CREATININE-BSD FRML MDRD: 75 ML/MIN/1.73SQ M
GLUCOSE P FAST SERPL-MCNC: 97 MG/DL (ref 65–99)
HCT VFR BLD AUTO: 42.6 % (ref 34.8–46.1)
HGB BLD-MCNC: 13.9 G/DL (ref 11.5–15.4)
IRON SATN MFR SERPL: 33 % (ref 15–50)
IRON SERPL-MCNC: 105 UG/DL (ref 50–212)
MCH RBC QN AUTO: 29.3 PG (ref 26.8–34.3)
MCHC RBC AUTO-ENTMCNC: 32.6 G/DL (ref 31.4–37.4)
MCV RBC AUTO: 90 FL (ref 82–98)
PLATELET # BLD AUTO: 236 THOUSANDS/UL (ref 149–390)
PMV BLD AUTO: 10.4 FL (ref 8.9–12.7)
POTASSIUM SERPL-SCNC: 4.3 MMOL/L (ref 3.5–5.3)
PROT SERPL-MCNC: 6.4 G/DL (ref 6.4–8.4)
PTH-INTACT SERPL-MCNC: 35.6 PG/ML (ref 12–88)
RBC # BLD AUTO: 4.74 MILLION/UL (ref 3.81–5.12)
SODIUM SERPL-SCNC: 141 MMOL/L (ref 135–147)
TIBC SERPL-MCNC: 316.4 UG/DL (ref 250–450)
TRANSFERRIN SERPL-MCNC: 226 MG/DL (ref 203–362)
UIBC SERPL-MCNC: 211 UG/DL (ref 155–355)
VIT B12 SERPL-MCNC: 899 PG/ML (ref 180–914)
WBC # BLD AUTO: 4.45 THOUSAND/UL (ref 4.31–10.16)

## 2025-03-08 PROCEDURE — 80053 COMPREHEN METABOLIC PANEL: CPT

## 2025-03-08 PROCEDURE — 84630 ASSAY OF ZINC: CPT

## 2025-03-08 PROCEDURE — 83550 IRON BINDING TEST: CPT

## 2025-03-08 PROCEDURE — 82607 VITAMIN B-12: CPT

## 2025-03-08 PROCEDURE — 85027 COMPLETE CBC AUTOMATED: CPT

## 2025-03-08 PROCEDURE — 84590 ASSAY OF VITAMIN A: CPT

## 2025-03-08 PROCEDURE — 83540 ASSAY OF IRON: CPT

## 2025-03-08 PROCEDURE — 36415 COLL VENOUS BLD VENIPUNCTURE: CPT

## 2025-03-08 PROCEDURE — 84425 ASSAY OF VITAMIN B-1: CPT

## 2025-03-08 PROCEDURE — 82746 ASSAY OF FOLIC ACID SERUM: CPT

## 2025-03-08 PROCEDURE — 83970 ASSAY OF PARATHORMONE: CPT

## 2025-03-08 PROCEDURE — 82306 VITAMIN D 25 HYDROXY: CPT

## 2025-03-08 PROCEDURE — 82728 ASSAY OF FERRITIN: CPT

## 2025-03-11 LAB — ZINC SERPL-MCNC: 80 UG/DL (ref 44–115)

## 2025-03-12 ENCOUNTER — ANNUAL EXAM (OUTPATIENT)
Dept: OBGYN CLINIC | Facility: CLINIC | Age: 45
End: 2025-03-12
Payer: COMMERCIAL

## 2025-03-12 VITALS
SYSTOLIC BLOOD PRESSURE: 116 MMHG | DIASTOLIC BLOOD PRESSURE: 78 MMHG | HEIGHT: 66 IN | WEIGHT: 178.4 LBS | BODY MASS INDEX: 28.67 KG/M2

## 2025-03-12 DIAGNOSIS — Z12.39 ENCOUNTER FOR BREAST CANCER SCREENING OTHER THAN MAMMOGRAM: ICD-10-CM

## 2025-03-12 DIAGNOSIS — Z11.51 SCREENING FOR HUMAN PAPILLOMAVIRUS (HPV): ICD-10-CM

## 2025-03-12 DIAGNOSIS — Z12.31 ENCOUNTER FOR SCREENING MAMMOGRAM FOR MALIGNANT NEOPLASM OF BREAST: ICD-10-CM

## 2025-03-12 DIAGNOSIS — Z12.11 ENCOUNTER FOR SCREENING FOR MALIGNANT NEOPLASM OF COLON: ICD-10-CM

## 2025-03-12 DIAGNOSIS — Z12.4 SCREENING FOR CERVICAL CANCER: ICD-10-CM

## 2025-03-12 DIAGNOSIS — Z01.419 ENCOUNTER FOR ANNUAL ROUTINE GYNECOLOGICAL EXAMINATION: Primary | ICD-10-CM

## 2025-03-12 DIAGNOSIS — R92.30 DENSE BREAST TISSUE: ICD-10-CM

## 2025-03-12 LAB — VIT B1 BLD-SCNC: 136 NMOL/L (ref 66.5–200)

## 2025-03-12 PROCEDURE — G0145 SCR C/V CYTO,THINLAYER,RESCR: HCPCS | Performed by: PHYSICIAN ASSISTANT

## 2025-03-12 PROCEDURE — G0476 HPV COMBO ASSAY CA SCREEN: HCPCS | Performed by: PHYSICIAN ASSISTANT

## 2025-03-12 PROCEDURE — S0612 ANNUAL GYNECOLOGICAL EXAMINA: HCPCS | Performed by: PHYSICIAN ASSISTANT

## 2025-03-12 NOTE — PROGRESS NOTES
Name: Jazmine Tran      : 1980      MRN: [id number]  Encounter Provider: Ritu Patton PA-C  Encounter Date: 3/12/2025   Encounter department: [facility] [place]  :  Assessment & Plan  Encounter for annual routine gynecological examination  Cervical cancer screening:  Normal cervical exam. Pap done today.    STD screening:  Patient declines.   Breast cancer screening:  Normal breast exam. Reviewed breast self-awareness. Mammo   Colon cancer screening: Order placed for consultation with Gastroenterology for consultation to discuss screening.  Depression Screening: Patient's depression screening was assessed with a PHQ-2 score of 0.   BMI Counseling: Body mass index is 28.79 kg/m². Discussed diet and exercise recommendations.  Contraception:  Dee   Return to office 1 year for annual exam, sooner PRN / otherwise directed .         Screening for cervical cancer    Orders:    Liquid-based pap, screening    HPV High Risk    Screening for human papillomavirus (HPV)    Orders:    Liquid-based pap, screening    HPV High Risk    Encounter for screening mammogram for malignant neoplasm of breast    Orders:    Mammo screening bilateral w 3d and cad; Future    Encounter for screening for malignant neoplasm of colon    Orders:    Ambulatory referral to Gastroenterology; Future    Encounter for breast cancer screening other than mammogram  Discussed that based on her lifetime risk and breast density she may benefit from additional screening with breast MRI - to be completed 6 months from screening mammogram. Discussed this is not always covered by insurance and I recommend checking coverage first to determine potential out of pocket cost. We reviewed risk, benefit, and limitations of imaging. She will consider this and is aware this has been ordered.     Orders:    MRI breast bilateral w and wo contrast w cad; Future    Dense breast tissue    Orders:    MRI breast  bilateral w and wo contrast w cad; Future        History of Present Illness   Jazmine Tran is a 44 y.o.  presenting for annual exam. She is without complaint.    Last Pap: 2020 NILM/hpv neg. Done today.   Last Mammo: 10/07/2024 BIRADS 2 - Benign. Lifetime Tyrer-Cuzick: 31.39%, heterogeneously dense  Last Colonoscopy: N/a    Periods are light and regular on Dee. No dysmenorrhea or cyclic sx.   LMP 25  Has had <handful of hot flash/night sweats. Not bothersome. Discussed perimenopausal s/sx, supportive therapies. To call office if desires menopause consult.     Sexually active: not currently - new relationship so maybe soon  Contraception: dee      Hx Abnormal pap: none   We reviewed [facility]  guidelines for Pap testing today.      Exercising regularly. New relationship, going well.     Review of Systems   Constitutional: Negative.         No breast concerns.   Gastrointestinal: Negative.    Genitourinary:  Negative for difficulty urinating, dysuria, frequency, pelvic pain, urgency, vaginal bleeding and vaginal discharge.   Skin: Negative.    Neurological:  Negative for headaches.   Psychiatric/Behavioral: Negative.       Medical History Reviewed by provider this encounter:  Problems     .  Current Outpatient Medications on File Prior to Visit   Medication Sig Dispense Refill    ascorbic acid (VITAMIN C) 500 MG tablet Take 1 tablet (500 mg total) by mouth 2 (two) times a day 60 tablet 0    CALCIUM PO Take 2 tablets by mouth 2 (two) times a day      Cetirizine HCl (ZYRTEC PO) Take 10 mg by mouth daily       Multiple Vitamins-Minerals (multivitamin with minerals) tablet Take 1 tablet by mouth daily 30 tablet 0    norethindrone (Dee) 0.35 MG tablet Take 1 tablet (0.35 mg total) by mouth daily 90 tablet 1     No current facility-administered medications on file prior to visit.      Social History     Tobacco Use    Smoking status: Never    Smokeless tobacco: Never   Vaping Use    Vaping  "status: Never Used   Substance and Sexual Activity    Alcohol use: Not Currently     Comment: Socially    Drug use: No    Sexual activity: Not Currently     Partners: Male     Birth control/protection: Abstinence, Ring        Objective   /78 (BP Location: Left arm, Patient Position: Sitting, Cuff Size: Standard)   Ht 5' 6\" (1.676 m)   Wt 80.9 kg (178 lb 6.4 oz)   LMP 02/24/2025 (Exact Date)   BMI 28.79 kg/m²      Physical Exam  Vitals and nursing note reviewed.   Constitutional:       General: She is not in acute distress.     Appearance: Normal appearance.   HENT:      Head: Normocephalic and atraumatic.   Eyes:      Conjunctiva/sclera: Conjunctivae normal.   Pulmonary:      Effort: Pulmonary effort is normal.   Chest:   Breasts:     Breasts are symmetrical.      Right: Normal.      Left: Normal.   Abdominal:      General: There is no distension.      Palpations: Abdomen is soft.      Tenderness: There is no abdominal tenderness.   Genitourinary:     General: Normal vulva.      Pubic Area: No rash.       Labia:         Right: No rash or lesion.         Left: No rash or lesion.       Vagina: Normal.      Cervix: Normal.      Uterus: Normal.       Adnexa: Right adnexa normal and left adnexa normal.   Lymphadenopathy:      Upper Body:      Right upper body: No supraclavicular or axillary adenopathy.      Left upper body: No supraclavicular or axillary adenopathy.      Lower Body: No right inguinal adenopathy. No left inguinal adenopathy.   Skin:     General: Skin is warm and dry.   Neurological:      General: No focal deficit present.      Mental Status: She is alert.      Cranial Nerves: No cranial nerve deficit.   Psychiatric:         Mood and Affect: Mood normal.         Behavior: Behavior normal.       "

## 2025-03-15 LAB — VIT A SERPL-MCNC: 51.4 UG/DL (ref 20.1–62)

## 2025-03-17 ENCOUNTER — OFFICE VISIT (OUTPATIENT)
Dept: FAMILY MEDICINE CLINIC | Facility: CLINIC | Age: 45
End: 2025-03-17
Payer: COMMERCIAL

## 2025-03-17 ENCOUNTER — RESULTS FOLLOW-UP (OUTPATIENT)
Dept: BARIATRICS | Facility: CLINIC | Age: 45
End: 2025-03-17

## 2025-03-17 VITALS
OXYGEN SATURATION: 100 % | HEART RATE: 86 BPM | DIASTOLIC BLOOD PRESSURE: 72 MMHG | WEIGHT: 179.4 LBS | BODY MASS INDEX: 28.83 KG/M2 | TEMPERATURE: 97.4 F | HEIGHT: 66 IN | SYSTOLIC BLOOD PRESSURE: 106 MMHG

## 2025-03-17 DIAGNOSIS — I73.00 RAYNAUD'S DISEASE WITHOUT GANGRENE: ICD-10-CM

## 2025-03-17 DIAGNOSIS — T78.40XA ALLERGY, INITIAL ENCOUNTER: Primary | ICD-10-CM

## 2025-03-17 PROCEDURE — 99214 OFFICE O/P EST MOD 30 MIN: CPT | Performed by: FAMILY MEDICINE

## 2025-03-17 NOTE — PROGRESS NOTES
"Name: Jazmine Tran      : 1980      MRN: [id number]  Encounter Provider: Alvin Zhou DO  Encounter Date: 3/17/2025   Encounter department: [facility]  :  Chief Complaint   Patient presents with    Allergies    Hand Problem     Finger color changing      BMI Counseling: Body mass index is 28.96 kg/m². The BMI is above normal. Nutrition recommendations include reducing portion sizes, reducing fast food intake, consuming healthier snacks, moderation in carbohydrate intake, and increasing intake of lean protein.    Assessment & Plan  Allergy, initial encounter    Orders:    Ambulatory Referral to Allergy; Future    Raynaud's disease without gangrene           Allergy for perioral redness.  Discussed Raynaud's.     History of Present Illness   Gets redness around outside of mouth.  Hands with Raynaud's.  Chart reviewed prior to visit.    I have spent a total time of 31 minutes in caring for this patient on the day of the visit/encounter including Instructions for management, Patient and family education, and Impressions.    Review of Systems   Constitutional: Negative.    HENT: Negative.     Eyes: Negative.    Respiratory: Negative.     Cardiovascular: Negative.    Gastrointestinal: Negative.    Genitourinary: Negative.    Musculoskeletal: Negative.    Skin: Negative.    Neurological: Negative.    Psychiatric/Behavioral: Negative.         Objective   /72 (BP Location: Left arm, Patient Position: Sitting, Cuff Size: Standard)   Pulse 86   Temp (!) 97.4 °F (36.3 °C) (Temporal)   Ht 5' 6\" (1.676 m)   Wt 81.4 kg (179 lb 6.4 oz)   LMP 2025 (Exact Date)   SpO2 100%   BMI 28.96 kg/m²      Physical Exam  Constitutional:       Appearance: She is well-developed.   HENT:      Head: Normocephalic and atraumatic.      Right Ear: External ear normal.      Left Ear: External ear normal.      Nose: Nose normal.      Mouth/Throat:      Mouth: Mucous membranes are moist.      Pharynx: " Oropharynx is clear.   Eyes:      Conjunctiva/sclera: Conjunctivae normal.      Pupils: Pupils are equal, round, and reactive to light.   Cardiovascular:      Rate and Rhythm: Normal rate and regular rhythm.      Heart sounds: Normal heart sounds.   Pulmonary:      Effort: Pulmonary effort is normal.      Breath sounds: Normal breath sounds.   Musculoskeletal:      Cervical back: Normal range of motion and neck supple.   Skin:     General: Skin is warm and dry.   Neurological:      Mental Status: She is alert and oriented to person, place, and time.      Deep Tendon Reflexes: Reflexes are normal and symmetric.   Psychiatric:         Mood and Affect: Mood normal.         Behavior: Behavior normal.         Thought Content: Thought content normal.         Judgment: Judgment normal.

## 2025-03-17 NOTE — LETTER
March 17, 2025     Patient: Jazmine Tran  YOB: 1980  Date of Visit: 3/17/2025      To Whom it May Concern:    Jazmine Tran is under my professional care. Jazmine was seen in my office on 3/17/2025. Jazmine may return to work on 3/18/2025 .    If you have any questions or concerns, please don't hesitate to call.         Sincerely,              Alvin Zhou DO        CC: No Recipients

## 2025-03-20 ENCOUNTER — RESULTS FOLLOW-UP (OUTPATIENT)
Dept: OBGYN CLINIC | Facility: CLINIC | Age: 45
End: 2025-03-20

## 2025-03-20 LAB
LAB AP GYN PRIMARY INTERPRETATION: NORMAL
Lab: NORMAL

## 2025-03-24 PROBLEM — J30.89 ALLERGIC RHINITIS DUE TO HOUSE DUST MITE: Status: ACTIVE | Noted: 2025-03-24

## 2025-03-24 PROBLEM — J30.1 CHRONIC SEASONAL ALLERGIC RHINITIS DUE TO POLLEN: Status: ACTIVE | Noted: 2025-03-24

## 2025-05-14 ENCOUNTER — HOSPITAL ENCOUNTER (OUTPATIENT)
Dept: RADIOLOGY | Facility: HOSPITAL | Age: 45
Discharge: HOME | End: 2025-05-14
Payer: COMMERCIAL

## 2025-05-14 DIAGNOSIS — R92.30 DENSE BREAST TISSUE: ICD-10-CM

## 2025-05-14 DIAGNOSIS — Z12.39 ENCOUNTER FOR BREAST CANCER SCREENING OTHER THAN MAMMOGRAM: ICD-10-CM

## 2025-05-14 PROCEDURE — C8937 CAD BREAST MRI: HCPCS

## 2025-05-14 PROCEDURE — C8908 MRI W/O FOL W/CONT, BREAST,: HCPCS

## 2025-05-14 PROCEDURE — A9585 GADOBUTROL INJECTION: HCPCS | Performed by: PHYSICIAN ASSISTANT

## 2025-05-14 RX ORDER — GADOBUTROL 604.72 MG/ML
8 INJECTION INTRAVENOUS
Status: COMPLETED | OUTPATIENT
Start: 2025-05-14 | End: 2025-05-14

## 2025-05-14 RX ADMIN — GADOBUTROL 8 ML: 604.72 INJECTION INTRAVENOUS at 19:13

## 2025-07-01 DIAGNOSIS — Z30.41 ENCOUNTER FOR SURVEILLANCE OF CONTRACEPTIVE PILLS: ICD-10-CM

## 2025-07-02 RX ORDER — NORETHINDRONE 0.35 MG/1
1 TABLET ORAL DAILY
Qty: 84 TABLET | Refills: 1 | Status: SHIPPED | OUTPATIENT
Start: 2025-07-02

## 2025-08-20 ENCOUNTER — TELEPHONE (OUTPATIENT)
Age: 45
End: 2025-08-20

## (undated) DEVICE — BLADELESS OBTURATOR: Brand: WECK VISTA

## (undated) DEVICE — COLUMN DRAPE

## (undated) DEVICE — ADHESIVE SKIN CLSR DERMABOND NX

## (undated) DEVICE — VIOLET BRAIDED (POLYGLACTIN 910), SYNTHETIC ABSORBABLE SUTURE: Brand: COATED VICRYL

## (undated) DEVICE — OSCILLATING TIP SAW CARTRIDGE: Brand: PRECISION FALCON

## (undated) DEVICE — INTENDED FOR TISSUE SEPARATION, AND OTHER PROCEDURES THAT REQUIRE A SHARP SURGICAL BLADE TO PUNCTURE OR CUT.: Brand: BARD-PARKER SAFETY BLADES SIZE 15, STERILE

## (undated) DEVICE — DISPOSABLE OR TOWEL: Brand: CARDINAL HEALTH

## (undated) DEVICE — TROCAR: Brand: KII FIOS FIRST ENTRY

## (undated) DEVICE — ANTI-FOG SOLUTION WITH FOAM PAD: Brand: DEVON

## (undated) DEVICE — CHLORAPREP HI-LITE 26ML ORANGE

## (undated) DEVICE — DRAPE TOWEL: Brand: CONVERTORS

## (undated) DEVICE — URETERAL CATHETER ADAPTOR TIP

## (undated) DEVICE — WEBRIL 6 IN UNSTERILE

## (undated) DEVICE — 2000CC GUARDIAN II: Brand: GUARDIAN

## (undated) DEVICE — DECANTER: Brand: UNBRANDED

## (undated) DEVICE — ARM DRAPE

## (undated) DEVICE — PLUMEPEN PRO 10FT

## (undated) DEVICE — VIAL DECANTER

## (undated) DEVICE — PMI DISPOSABLE PUNCTURE CLOSURE DEVICE / SUTURE GRASPER: Brand: PMI

## (undated) DEVICE — DRAPE C-ARM X-RAY

## (undated) DEVICE — REDUCER: Brand: ENDOWRIST

## (undated) DEVICE — HEAVY DUTY TABLE COVER: Brand: CONVERTORS

## (undated) DEVICE — BETHLEHEM UNIV MAJOR ORTHO,KIT: Brand: CARDINAL HEALTH

## (undated) DEVICE — SPONGE PVP SCRUB WING STERILE

## (undated) DEVICE — STAPLER 60 RELOAD BLUE: Brand: SUREFORM

## (undated) DEVICE — ASTOUND STANDARD SURGICAL GOWN, XL: Brand: CONVERTORS

## (undated) DEVICE — SURGICAL GOWN, XL SMARTSLEEVE: Brand: CONVERTORS

## (undated) DEVICE — PENCIL ELECTROSURG E-Z CLEAN -0035H

## (undated) DEVICE — DISPOSABLE EQUIPMENT COVER: Brand: SMALL TOWEL DRAPE

## (undated) DEVICE — MEGA SUTURECUT ND: Brand: ENDOWRIST

## (undated) DEVICE — 3M™ IOBAN™ 2 ANTIMICROBIAL INCISE DRAPE 6650EZ: Brand: IOBAN™ 2

## (undated) DEVICE — GLOVE SRG BIOGEL 7.5

## (undated) DEVICE — CANNULA SEAL

## (undated) DEVICE — TIBURON LAPAROSCOPIC ABDOMINAL DRAPE: Brand: CONVERTORS

## (undated) DEVICE — SEALANT FIBRIN VISTASEAL 10ML

## (undated) DEVICE — IRRIG ENDO FLO TUBING

## (undated) DEVICE — IMPERVIOUS STOCKINETTE: Brand: DEROYAL

## (undated) DEVICE — SCD SEQUENTIAL COMPRESSION COMFORT SLEEVE MEDIUM KNEE LENGTH: Brand: KENDALL SCD

## (undated) DEVICE — STRL COTTON TIP APPLCTR 6IN PK: Brand: CARDINAL HEALTH

## (undated) DEVICE — STRL PENROSE DRAIN 18" X 1/4": Brand: CARDINAL HEALTH

## (undated) DEVICE — [HIGH FLOW INSUFFLATOR,  DO NOT USE IF PACKAGE IS DAMAGED,  KEEP DRY,  KEEP AWAY FROM SUNLIGHT,  PROTECT FROM HEAT AND RADIOACTIVE SOURCES.]: Brand: PNEUMOSURE

## (undated) DEVICE — CAPIT HIP COP - ACTIS ONLY

## (undated) DEVICE — THE SIMPULSE SOLO SYSTEM WITH ULTREX RETRACTABLE SPLASH SHIELD TIP: Brand: SIMPULSE SOLO

## (undated) DEVICE — CADIERE FORCEPS: Brand: ENDOWRIST

## (undated) DEVICE — SUT MONOCRYL 4-0 PS-2 27 IN Y426H

## (undated) DEVICE — CAPIT HIP UPCHRG  GRIPTION CUP

## (undated) DEVICE — TUBING SMOKE EVAC W/FILTRATION DEVICE PLUMEPORT ACTIV

## (undated) DEVICE — HOOD: Brand: FLYTE, SURGICOOL

## (undated) DEVICE — DRAPE SHEET THREE QUARTER

## (undated) DEVICE — 3000CC GUARDIAN II: Brand: GUARDIAN

## (undated) DEVICE — SUT MONOCRYL 3-0 PS-2 18 IN Y497G

## (undated) DEVICE — STAPLER 60: Brand: SUREFORM

## (undated) DEVICE — STAPLER CANNULA SEAL: Brand: ENDOWRIST

## (undated) DEVICE — NEEDLE SPINAL18G X 3.5 IN QUINCKE

## (undated) DEVICE — 3M™ TEGADERM™ TRANSPARENT FILM DRESSING FRAME STYLE, 1628, 6 IN X 8 IN (15 CM X 20 CM), 10/CT 8CT/CASE: Brand: 3M™ TEGADERM™

## (undated) DEVICE — ADHESIVE SKIN HIGH VISCOSITY EXOFIN 1ML

## (undated) DEVICE — VESSEL SEALER EXTEND: Brand: ENDOWRIST

## (undated) DEVICE — GAUZE SPONGES,16 PLY: Brand: CURITY

## (undated) DEVICE — 40601 PROLONGED POSITIONING SYSTEM: Brand: 40601 PROLONGED POSITIONING SYSTEM

## (undated) DEVICE — ENDOPATH PNEUMONEEDLE INSUFFLATION NEEDLES WITH LUER LOCK CONNECTORS 120MM: Brand: ENDOPATH

## (undated) DEVICE — DRAPE EQUIPMENT RF WAND

## (undated) DEVICE — ABSORBABLE WOUND CLOSURE DEVICE: Brand: V-LOC 90

## (undated) DEVICE — SYRINGE 20ML LL

## (undated) DEVICE — NEEDLE 22 G X 1 1/2 SAFETY

## (undated) DEVICE — GLOVE INDICATOR PI UNDERGLOVE SZ 8.5 BLUE

## (undated) DEVICE — DRAPE CAMERA/LASER

## (undated) DEVICE — TRAVELKIT CONTAINS FIRST STEP KIT (200ML EP-4 KIT) AND SOILED SCOPE BAG - 1 KIT: Brand: TRAVELKIT CONTAINS FIRST STEP KIT AND SOILED SCOPE BAG

## (undated) DEVICE — TRAY FOLEY 16FR URIMETER SURESTEP

## (undated) DEVICE — LAPAROSCOPIC DUAL RIGID APPLICATOR: Brand: ETHICON

## (undated) DEVICE — PAD GROUNDING ADULT

## (undated) DEVICE — STAPLER 60 RELOAD GREEN: Brand: SUREFORM

## (undated) DEVICE — KIT, ROBOTIC BARIATRIC: Brand: CARDINAL HEALTH

## (undated) DEVICE — BETHLEHEM UNIVERSAL MINOR GEN: Brand: CARDINAL HEALTH

## (undated) DEVICE — GLOVE SRG BIOGEL 8

## (undated) DEVICE — VISIGI 3D®  CALIBRATION SYSTEM  SIZE 36FR SLEEVE/STD: Brand: BOEHRINGER® VISIGI 3D™ SLEEVE GASTRECTOMY CALIBRATION SYSTEM, SIZE 36FR

## (undated) DEVICE — ENDOPATH XCEL BLADELESS TROCARS WITH STABILITY SLEEVES: Brand: ENDOPATH XCEL

## (undated) DEVICE — SUT STRATAFIX SPIRAL PDS PLUS 1 CTX 18 IN SXPP1A400

## (undated) DEVICE — SUT VICRYL 2-0 CT-1 27 IN J259H

## (undated) DEVICE — ELECTRODE BLADE E-Z CLEAN 4IN -0014A